# Patient Record
Sex: FEMALE | Race: WHITE | Employment: FULL TIME | URBAN - NONMETROPOLITAN AREA
[De-identification: names, ages, dates, MRNs, and addresses within clinical notes are randomized per-mention and may not be internally consistent; named-entity substitution may affect disease eponyms.]

---

## 2017-03-06 ENCOUNTER — TELEPHONE (OUTPATIENT)
Dept: OBGYN | Age: 37
End: 2017-03-06

## 2017-03-14 ENCOUNTER — OFFICE VISIT (OUTPATIENT)
Dept: OBGYN | Age: 37
End: 2017-03-14
Payer: COMMERCIAL

## 2017-03-14 VITALS
BODY MASS INDEX: 21.79 KG/M2 | TEMPERATURE: 99.6 F | HEART RATE: 69 BPM | SYSTOLIC BLOOD PRESSURE: 109 MMHG | WEIGHT: 135 LBS | DIASTOLIC BLOOD PRESSURE: 69 MMHG

## 2017-03-14 DIAGNOSIS — R30.0 BURNING WITH URINATION: ICD-10-CM

## 2017-03-14 DIAGNOSIS — N94.10 PAIN IN FEMALE GENITALIA ON INTERCOURSE: ICD-10-CM

## 2017-03-14 DIAGNOSIS — N39.0 FREQUENT UTI: Primary | ICD-10-CM

## 2017-03-14 DIAGNOSIS — N93.0 BLEEDING AFTER INTERCOURSE: ICD-10-CM

## 2017-03-14 DIAGNOSIS — R31.9 BLOOD IN URINE: ICD-10-CM

## 2017-03-14 LAB
BACTERIA URINE, POC: ABNORMAL
BILIRUBIN URINE: ABNORMAL MG/DL
BLOOD, URINE: POSITIVE
CASTS URINE, POC: ABNORMAL
CLARITY: ABNORMAL
COLOR: ABNORMAL
CRYSTALS URINE, POC: ABNORMAL
EPI CELLS URINE, POC: ABNORMAL
GLUCOSE URINE: ABNORMAL
KETONES, URINE: POSITIVE
LEUKOCYTE EST, POC: NEGATIVE
NITRITE, URINE: POSITIVE
PH UA: ABNORMAL (ref 4.5–8)
PROTEIN UA: NEGATIVE
RBC URINE, POC: ABNORMAL
SPECIFIC GRAVITY UA: ABNORMAL (ref 1–1.03)
UROBILINOGEN, URINE: NORMAL
WBC URINE, POC: ABNORMAL
YEAST URINE, POC: ABNORMAL

## 2017-03-14 PROCEDURE — 81000 URINALYSIS NONAUTO W/SCOPE: CPT | Performed by: NURSE PRACTITIONER

## 2017-03-14 PROCEDURE — 99213 OFFICE O/P EST LOW 20 MIN: CPT | Performed by: NURSE PRACTITIONER

## 2017-03-14 RX ORDER — BACLOFEN 10 MG/1
TABLET ORAL
COMMUNITY
Start: 2017-01-23 | End: 2017-10-26

## 2017-03-14 RX ORDER — NITROFURANTOIN 25; 75 MG/1; MG/1
100 CAPSULE ORAL 2 TIMES DAILY
Qty: 20 CAPSULE | Refills: 0 | Status: SHIPPED | OUTPATIENT
Start: 2017-03-14 | End: 2017-03-24

## 2017-03-14 RX ORDER — ZOLPIDEM TARTRATE 10 MG/1
TABLET ORAL
COMMUNITY
Start: 2017-02-16 | End: 2017-10-26

## 2017-03-14 ASSESSMENT — ENCOUNTER SYMPTOMS
TROUBLE SWALLOWING: 0
COUGH: 0
SHORTNESS OF BREATH: 0
CONSTIPATION: 0
DIARRHEA: 0
BACK PAIN: 0
BLOOD IN STOOL: 0
COLOR CHANGE: 0

## 2017-03-16 LAB — URINE CULTURE, ROUTINE: NORMAL

## 2017-03-21 DIAGNOSIS — N76.0 BV (BACTERIAL VAGINOSIS): Primary | ICD-10-CM

## 2017-03-21 DIAGNOSIS — B96.89 BV (BACTERIAL VAGINOSIS): Primary | ICD-10-CM

## 2017-03-21 RX ORDER — METRONIDAZOLE 500 MG/1
500 TABLET ORAL 2 TIMES DAILY WITH MEALS
Qty: 14 TABLET | Refills: 0 | Status: SHIPPED | OUTPATIENT
Start: 2017-03-21 | End: 2017-03-28

## 2017-10-26 ENCOUNTER — OFFICE VISIT (OUTPATIENT)
Dept: OBGYN | Age: 37
End: 2017-10-26
Payer: COMMERCIAL

## 2017-10-26 VITALS
BODY MASS INDEX: 24.43 KG/M2 | TEMPERATURE: 97.8 F | HEART RATE: 76 BPM | SYSTOLIC BLOOD PRESSURE: 119 MMHG | HEIGHT: 66 IN | WEIGHT: 152 LBS | DIASTOLIC BLOOD PRESSURE: 73 MMHG

## 2017-10-26 DIAGNOSIS — Z01.419 ENCOUNTER FOR GYNECOLOGICAL EXAMINATION WITHOUT ABNORMAL FINDING: Primary | ICD-10-CM

## 2017-10-26 DIAGNOSIS — Z12.4 SCREENING FOR CERVICAL CANCER: ICD-10-CM

## 2017-10-26 PROCEDURE — 99395 PREV VISIT EST AGE 18-39: CPT

## 2017-10-26 RX ORDER — ETODOLAC 400 MG/1
TABLET, FILM COATED ORAL
Refills: 3 | COMMUNITY
Start: 2017-10-09 | End: 2019-12-27 | Stop reason: ALTCHOICE

## 2017-10-26 RX ORDER — VILAZODONE HYDROCHLORIDE 40 MG/1
TABLET ORAL
Refills: 1 | COMMUNITY
Start: 2017-10-17 | End: 2019-12-27

## 2017-10-26 RX ORDER — NORGESTIMATE AND ETHINYL ESTRADIOL 0.25-0.035
KIT ORAL
Qty: 84 TABLET | Refills: 3 | Status: SHIPPED | OUTPATIENT
Start: 2017-10-26 | End: 2019-12-27

## 2017-10-26 ASSESSMENT — ENCOUNTER SYMPTOMS
SHORTNESS OF BREATH: 0
BLOOD IN STOOL: 0
COUGH: 0
TROUBLE SWALLOWING: 0
BACK PAIN: 0
DIARRHEA: 0
COLOR CHANGE: 0
CONSTIPATION: 0

## 2017-10-26 NOTE — PROGRESS NOTES
Subjective:      Patient ID: Marti Garcia is a 44 y.o. female. Patient presents today for a pap smear and breast exam.  HPI    Review of Systems   Constitutional: Negative for appetite change, fatigue, fever and unexpected weight change. HENT: Negative for hearing loss and trouble swallowing. Eyes: Negative for visual disturbance. Respiratory: Negative for cough and shortness of breath. Cardiovascular: Negative for chest pain and palpitations. Gastrointestinal: Negative for blood in stool, constipation and diarrhea. Genitourinary: Negative for dyspareunia, dysuria, frequency, menstrual problem, pelvic pain, urgency and vaginal discharge. Musculoskeletal: Negative for back pain, myalgias and neck pain. Skin: Negative for color change and rash. Neurological: Negative for dizziness, weakness and headaches. Hematological: Does not bruise/bleed easily. Psychiatric/Behavioral: Negative for agitation and sleep disturbance. The patient is not nervous/anxious. Marti Garcia is a 44 y.o. female with the following history as recorded in [facility]:  Patient Active Problem List    Diagnosis Date Noted    Screening for cervical cancer 10/26/2017     Current Outpatient Prescriptions   Medication Sig Dispense Refill    VILAZODONE HCL 40 MG Tablet TAKE 1 TABLET BY MOUTH EVERY DAY WITH FOOD  1    etodolac (LODINE) 400 MG tablet TAKE 1 TABLET BY MOUTH TWICE A DAY  3    norgestimate-ethinyl estradiol (SPRINTEC 28) 0.25-35 MG-MCG per tablet TAKE 1 TABLET BY MOUTH DAILY 84 tablet 3     No current facility-administered medications for this visit.       Allergies: Doxycycline; Erythromycin; and Sulfa antibiotics  Past Medical History:   Diagnosis Date    Allergic     Anemia     with pregnancy    ASCUS favor benign 10/2015    Depression     Recurrent miscarriages      Past Surgical History:   Procedure Laterality Date    DENTAL SURGERY      Tooth Implant    DILATION AND CURETTAGE OF UTERUS      ORDERS:  No orders of the defined types were placed in this encounter. The importance of self-breast examination was discussed. A screening mammogram is recommended at age 28 unless otherwise indicated. At age 36, annual mammogram screenings are recommended. Birth control options were discussed. Rx sent in with refills. A well balanced diet and exercise were encouraged, as well as the addition of multivitamin. Pt to RTO in 2 weeks to collect pap smear.

## 2017-10-26 NOTE — PATIENT INSTRUCTIONS
dentist one or two times a year for checkups and to have your teeth cleaned. · Wear a seat belt in the car. · Drink alcohol in moderation, if at all. That means no more than 2 drinks a day for men and 1 drink a day for women. Follow your doctor's advice about when to have certain tests. These tests can spot problems early. For everyone  · Cholesterol. Have the fat (cholesterol) in your blood tested after age 21. Your doctor will tell you how often to have this done based on your age, family history, or other things that can increase your risk for heart disease. · Blood pressure. Have your blood pressure checked during a routine doctor visit. Your doctor will tell you how often to check your blood pressure based on your age, your blood pressure results, and other factors. · Vision. Talk with your doctor about how often to have a glaucoma test.  · Diabetes. Ask your doctor whether you should have tests for diabetes. · Colon cancer. Have a test for colon cancer at age 48. You may have one of several tests. If you are younger than 48, you may need a test earlier if you have any risk factors. Risk factors include whether you already had a precancerous polyp removed from your colon or whether your parent, brother, sister, or child has had colon cancer. For women  · Breast exam and mammogram. Talk to your doctor about when you should have a clinical breast exam and a mammogram. Medical experts differ on whether and how often women under 50 should have these tests. Your doctor can help you decide what is right for you. · Pap test and pelvic exam. Begin Pap tests at age 24. A Pap test is the best way to find cervical cancer. The test often is part of a pelvic exam. Ask how often to have this test.  · Tests for sexually transmitted infections (STIs). Ask whether you should have tests for STIs. You may be at risk if you have sex with more than one person, especially if your partners do not wear condoms.   For men  · Tests for sexually transmitted infections (STIs). Ask whether you should have tests for STIs. You may be at risk if you have sex with more than one person, especially if you do not wear a condom. · Testicular cancer exam. Ask your doctor whether you should check your testicles regularly. · Prostate exam. Talk to your doctor about whether you should have a blood test (called a PSA test) for prostate cancer. Experts differ on whether and when men should have this test. Some experts suggest it if you are older than 39 and are -American or have a father or brother who got prostate cancer when he was younger than 72. When should you call for help? Watch closely for changes in your health, and be sure to contact your doctor if you have any problems or symptoms that concern you. Where can you learn more? Go to https://Twonqpevickieb.healthBig red truck driving school. org and sign in to your One-Song account. Enter P072 in the ADTELLIGENCE box to learn more about \"Well Visit, Ages 25 to 48: Care Instructions. \"     If you do not have an account, please click on the \"Sign Up Now\" link. Current as of: July 19, 2016  Content Version: 11.3  © 2998-0787 [employer], Incorporated. Care instructions adapted under license by [facility] ([facility]). If you have questions about a medical condition or this instruction, always ask your healthcare professional. [address] any warranty or liability for your use of this information.

## 2017-12-05 ENCOUNTER — OFFICE VISIT (OUTPATIENT)
Dept: OBGYN | Age: 37
End: 2017-12-05
Payer: COMMERCIAL

## 2017-12-05 VITALS
HEIGHT: 66 IN | DIASTOLIC BLOOD PRESSURE: 72 MMHG | BODY MASS INDEX: 25.23 KG/M2 | WEIGHT: 157 LBS | SYSTOLIC BLOOD PRESSURE: 115 MMHG

## 2017-12-05 DIAGNOSIS — Z12.4 SCREENING FOR CERVICAL CANCER: ICD-10-CM

## 2017-12-05 DIAGNOSIS — R10.31 RLQ ABDOMINAL PAIN: Primary | ICD-10-CM

## 2017-12-05 PROCEDURE — 99213 OFFICE O/P EST LOW 20 MIN: CPT | Performed by: NURSE PRACTITIONER

## 2017-12-05 ASSESSMENT — ENCOUNTER SYMPTOMS
RESPIRATORY NEGATIVE: 1
GASTROINTESTINAL NEGATIVE: 1
EYES NEGATIVE: 1

## 2017-12-05 NOTE — PROGRESS NOTES
uterus that opens into the vagina. The test can help your doctor find early changes in the cells that could lead to cancer. The sample of cells taken during your test has been sent to a lab so that an expert can look at the cells. It usually takes a week or two to get the results back. Follow-up care is a key part of your treatment and safety. Be sure to make and go to all appointments, and call your doctor if you are having problems. It's also a good idea to know your test results and keep a list of the medicines you take. What do the results mean? · A normal result means that the test did not find any abnormal cells in the sample. · An abnormal result can mean many things. Most of these are not cancer. The results of your test may be abnormal because:  ¨ You have an infection of the vagina or cervix, such as a yeast infection. ¨ You have an IUD (intrauterine device for birth control). ¨ You have low estrogen levels after menopause that are causing the cells to change. ¨ You have cell changes that may be a sign of precancer or cancer. The results are ranked based on how serious the changes might be. There are many other reasons why you might not get a normal result. If the results were abnormal, you may need to get another test within a few weeks or months. If the results show changes that could be a sign of cancer, you may need a test called a colposcopy, which provides a more complete view of the cervix. Sometimes the lab cannot use the sample because it does not contain enough cells or was not preserved well. If so, you may need to have the test again. This is not common, but it does happen from time to time. When should you call for help?   Watch closely for changes in your health, and be sure to contact your doctor if:  · You have vaginal bleeding or pain for more than 2 days after the test. It is normal to have a small amount of bleeding for a day or two after the test.  Where can you learn more?  Go to https://chpepiceweb.[email or web address] and sign in to your Protenust account. Enter M152 in the [facility] box to learn more about \"Pap Test: Care Instructions. \"     If you do not have an account, please click on the \"Sign Up Now\" link. Current as of: July 26, 2016  Content Version: 11.3  © 3664-9927 Eig[employer] Branded Reality. Care instructions adapted under license by [facility] ([facility]). If you have questions about a medical condition or this instruction, always ask your healthcare professional. [place] [address] any warranty or liability for your use of this information.

## 2017-12-05 NOTE — PATIENT INSTRUCTIONS
doctor if:  · You have vaginal bleeding or pain for more than 2 days after the test. It is normal to have a small amount of bleeding for a day or two after the test.  Where can you learn more? Go to https://[email or web address].[email or web address] org and sign in to your Kira Talent account. Enter B079 in the [employer] box to learn more about \"Pap Test: Care Instructions. \"     If you do not have an account, please click on the \"Sign Up Now\" link. Current as of: July 26, 2016  Content Version: 11.3  © 8092-2384 [employer]12 Technologies, BackTrack. Care instructions adapted under license by [facility] ([facility]). If you have questions about a medical condition or this instruction, always ask your healthcare professional. [address] any warranty or liability for your use of this information.

## 2017-12-06 ENCOUNTER — TRANSCRIBE ORDERS (OUTPATIENT)
Dept: ADMINISTRATIVE | Facility: HOSPITAL | Age: 37
End: 2017-12-06

## 2017-12-06 ENCOUNTER — TELEPHONE (OUTPATIENT)
Dept: OBGYN | Age: 37
End: 2017-12-06

## 2017-12-06 DIAGNOSIS — R10.31 ABDOMINAL PAIN, RIGHT LOWER QUADRANT: Primary | ICD-10-CM

## 2017-12-08 ENCOUNTER — HOSPITAL ENCOUNTER (OUTPATIENT)
Dept: ULTRASOUND IMAGING | Facility: HOSPITAL | Age: 37
Discharge: HOME | End: 2017-12-08
Admitting: NURSE PRACTITIONER

## 2017-12-08 DIAGNOSIS — R10.31 ABDOMINAL PAIN, RIGHT LOWER QUADRANT: ICD-10-CM

## 2017-12-08 PROCEDURE — 76830 TRANSVAGINAL US NON-OB: CPT

## 2018-03-24 ENCOUNTER — TRANSCRIBE ORDERS (OUTPATIENT)
Dept: ADMINISTRATIVE | Facility: HOSPITAL | Age: 38
End: 2018-03-24

## 2018-03-24 ENCOUNTER — HOSPITAL ENCOUNTER (OUTPATIENT)
Dept: GENERAL RADIOLOGY | Facility: HOSPITAL | Age: 38
Discharge: HOME | End: 2018-03-24
Admitting: NURSE PRACTITIONER

## 2018-03-24 ENCOUNTER — TRANSCRIBE ORDERS (OUTPATIENT)
Dept: LAB | Facility: HOSPITAL | Age: 38
End: 2018-03-24

## 2018-03-24 ENCOUNTER — APPOINTMENT (OUTPATIENT)
Dept: LAB | Facility: HOSPITAL | Age: 38
End: 2018-03-24

## 2018-03-24 DIAGNOSIS — R10.30 LOWER ABDOMINAL PAIN: Primary | ICD-10-CM

## 2018-03-24 LAB
ALBUMIN SERPL-MCNC: 4.3 G/DL (ref 3.5–5)
ALBUMIN/GLOB SERPL: 1.2 G/DL (ref 1.1–2.5)
ALP SERPL-CCNC: 50 U/L (ref 24–120)
ALT SERPL W P-5'-P-CCNC: 41 U/L (ref 0–54)
AMYLASE SERPL-CCNC: 55 U/L (ref 30–110)
ANION GAP SERPL CALCULATED.3IONS-SCNC: 11 MMOL/L (ref 4–13)
AST SERPL-CCNC: 22 U/L (ref 7–45)
BILIRUB SERPL-MCNC: 0.8 MG/DL (ref 0.1–1)
BUN BLD-MCNC: 15 MG/DL (ref 5–21)
BUN/CREAT SERPL: 19.2 (ref 7–25)
CALCIUM SPEC-SCNC: 10 MG/DL (ref 8.4–10.4)
CHLORIDE SERPL-SCNC: 97 MMOL/L (ref 98–110)
CO2 SERPL-SCNC: 30 MMOL/L (ref 24–31)
CREAT BLD-MCNC: 0.78 MG/DL (ref 0.5–1.4)
DEPRECATED RDW RBC AUTO: 45 FL (ref 40–54)
ERYTHROCYTE [DISTWIDTH] IN BLOOD BY AUTOMATED COUNT: 13.2 % (ref 12–15)
GFR SERPL CREATININE-BSD FRML MDRD: 82 ML/MIN/1.73
GLOBULIN UR ELPH-MCNC: 3.6 GM/DL
GLUCOSE BLD-MCNC: 97 MG/DL (ref 70–100)
HBA1C MFR BLD: 5.4 %
HCT VFR BLD AUTO: 41.1 % (ref 37–47)
HGB BLD-MCNC: 13.4 G/DL (ref 12–16)
LIPASE SERPL-CCNC: 39 U/L (ref 23–203)
LYMPHOCYTES # BLD MANUAL: 4.34 10*3/MM3 (ref 0.72–4.86)
LYMPHOCYTES NFR BLD MANUAL: 2 % (ref 4–12)
LYMPHOCYTES NFR BLD MANUAL: 22 % (ref 15–45)
MCH RBC QN AUTO: 29.9 PG (ref 28–32)
MCHC RBC AUTO-ENTMCNC: 32.6 G/DL (ref 33–36)
MCV RBC AUTO: 91.7 FL (ref 82–98)
MONOCYTES # BLD AUTO: 0.39 10*3/MM3 (ref 0.19–1.3)
NEUTROPHILS # BLD AUTO: 14.8 10*3/MM3 (ref 1.87–8.4)
NEUTROPHILS NFR BLD MANUAL: 72 % (ref 39–78)
NEUTS BAND NFR BLD MANUAL: 3 % (ref 0–10)
PLATELET # BLD AUTO: 404 10*3/MM3 (ref 130–400)
PMV BLD AUTO: 10.1 FL (ref 6–12)
POTASSIUM BLD-SCNC: 4 MMOL/L (ref 3.5–5.3)
PROT SERPL-MCNC: 7.9 G/DL (ref 6.3–8.7)
RBC # BLD AUTO: 4.48 10*6/MM3 (ref 4.2–5.4)
RBC MORPH BLD: NORMAL
SMALL PLATELETS BLD QL SMEAR: ADEQUATE
SODIUM BLD-SCNC: 138 MMOL/L (ref 135–145)
VARIANT LYMPHS NFR BLD MANUAL: 1 % (ref 0–5)
WBC MORPH BLD: NORMAL
WBC NRBC COR # BLD: 19.73 10*3/MM3 (ref 4.8–10.8)

## 2018-03-24 PROCEDURE — 82150 ASSAY OF AMYLASE: CPT | Performed by: NURSE PRACTITIONER

## 2018-03-24 PROCEDURE — 36415 COLL VENOUS BLD VENIPUNCTURE: CPT

## 2018-03-24 PROCEDURE — 74018 RADEX ABDOMEN 1 VIEW: CPT

## 2018-03-24 PROCEDURE — 83036 HEMOGLOBIN GLYCOSYLATED A1C: CPT | Performed by: NURSE PRACTITIONER

## 2018-03-24 PROCEDURE — 87086 URINE CULTURE/COLONY COUNT: CPT | Performed by: NURSE PRACTITIONER

## 2018-03-24 PROCEDURE — 80053 COMPREHEN METABOLIC PANEL: CPT | Performed by: NURSE PRACTITIONER

## 2018-03-24 PROCEDURE — 85027 COMPLETE CBC AUTOMATED: CPT | Performed by: NURSE PRACTITIONER

## 2018-03-24 PROCEDURE — 85007 BL SMEAR W/DIFF WBC COUNT: CPT | Performed by: NURSE PRACTITIONER

## 2018-03-24 PROCEDURE — 83690 ASSAY OF LIPASE: CPT | Performed by: NURSE PRACTITIONER

## 2018-03-26 LAB
BACTERIA SPEC AEROBE CULT: ABNORMAL
BACTERIA SPEC AEROBE CULT: ABNORMAL

## 2018-04-12 PROBLEM — Z12.4 SCREENING FOR CERVICAL CANCER: Status: RESOLVED | Noted: 2017-10-26 | Resolved: 2018-04-12

## 2018-04-17 NOTE — PROGRESS NOTES
Ms. Zheng is 38 y.o. female    CHIEF COMPLAINT: I am here for recurring UTI.    HPI  Recurrent UTI  Patient is here for recurrent UTI.  This is a(an) intial visit. Possible coexisting conditions or situations that may predispose the patient to urinary tract include no obvious predisposition. The infections have been occurring for 5month(s).  There has been 4 infections in the last 5 month(s).  Organ involvement suggested by referring physician or patient is bladder. is unaware of their culture history.  Other evaluation includes urinalysis, physical exam and history.  Symptoms, when present, include urgency and frequency and back pain. The patient has been treated with  antibiotics  somewhat effective.               The following portions of the patient's history were reviewed and updated as appropriate: allergies, current medications, past family history, past medical history, past social history, past surgical history and problem list.      Review of Systems   Constitutional: Negative for appetite change, diaphoresis and fever.   HENT: Negative for facial swelling and sore throat.    Eyes: Negative for discharge and visual disturbance.   Respiratory: Negative for cough and shortness of breath.    Cardiovascular: Negative for chest pain and leg swelling.   Gastrointestinal: Negative for anal bleeding and vomiting.   Endocrine: Negative for cold intolerance and heat intolerance.   Genitourinary: Positive for frequency, pelvic pain (sharp pains) and urgency. Negative for flank pain and hematuria.   Musculoskeletal: Negative for back pain and gait problem.   Skin: Negative for pallor and rash.   Allergic/Immunologic: Negative for food allergies and immunocompromised state.   Neurological: Negative for seizures and headaches.   Hematological: Negative for adenopathy. Does not bruise/bleed easily.   Psychiatric/Behavioral: Negative for dysphoric mood, self-injury and suicidal ideas.           Current Outpatient  "Prescriptions:   •  citalopram (CeleXA) 20 MG tablet, Take 20 mg by mouth Daily., Disp: , Rfl:   •  Norgestimate-Eth Estradiol (SPRINTEC 28 PO), Take  by mouth., Disp: , Rfl:   •  cefuroxime (CEFTIN) 250 MG tablet, Take 1 tablet by mouth 2 (Two) Times a Day., Disp: 20 tablet, Rfl: 0  •  cetirizine (zyrTEC) 10 MG tablet, Take 1 tablet by mouth Daily., Disp: 30 tablet, Rfl: 0  •  nitrofurantoin (MACRODANTIN) 50 MG capsule, Take 1 capsule by mouth Every Night for 30 days., Disp: 30 capsule, Rfl: 2  •  oseltamivir (TAMIFLU) 75 MG capsule, Take 1 capsule by mouth Daily., Disp: 10 capsule, Rfl: 0    Past Medical History:   Diagnosis Date   • BV (bacterial vaginosis)        Past Surgical History:   Procedure Laterality Date   • TUBAL ABDOMINAL LIGATION         Social History     Social History   • Marital status:      Social History Main Topics   • Smoking status: Never Smoker   • Smokeless tobacco: Never Used   • Alcohol use No   • Drug use: Unknown     Other Topics Concern   • Not on file       No family history on file.      /68   Temp 97.6 °F (36.4 °C)   Ht 167.6 cm (66\")   Wt 71.2 kg (157 lb)   BMI 25.34 kg/m²       Physical Exam  Constitutional: Well nourished, Well developed; No apparent distress  Psychiatric: Appropriate affect; Alert and oriented  Eyes: Unremarkable  Musculoskeletal: Normal gait and station  GI: Abdomen is soft, non-tender  Respiratory: No distress; Unlabored movement; No accessory musculature needed with symmetric movements  Skin: No pallor or diaphoresis  : Labia normal; Urethral meatus normal position, not stenotic; No significant bladder prolapse.         Data  Results for orders placed or performed in visit on 04/23/18   POC Urinalysis Dipstick, Automated   Result Value Ref Range    Color Yellow Yellow, Straw, Dark Yellow, Tarsha    Clarity, UA Clear Clear    Glucose, UA Negative Negative, 1000 mg/dL (3+) mg/dL    Bilirubin Negative Negative    Ketones, UA Negative Negative "    Specific Gravity  1.145 (A) 1.005 - 1.030    Blood, UA Trace (A) Negative    pH, Urine 5.5 5.0 - 8.0    Protein, POC Negative Negative mg/dL    Urobilinogen, UA Normal Normal    Leukocytes Negative Negative    Nitrite, UA Negative Negative           Assessment and Plan  Diagnoses and all orders for this visit:    Recurrent cystitis  -     POC Urinalysis Dipstick, Automated  -     nitrofurantoin (MACRODANTIN) 50 MG capsule; Take 1 capsule by mouth Every Night for 30 days.  -     US Renal Bilateral; Future      The patient understands the recurrent urinary tract infections are often multifactorial in origin.  Discussion of optimum management in setting of no anatomic abnormalities for recurrent UTIs is completed.  Antimicrobial therapies including the risks, convenience, and rationale were explained including postcoital prophylaxis, self start therapy and daily prophylaxis are all explained.  Based upon this discussion we have elected to start daily nitrofurantoin.  I did explain that some bacteria or naturally resistant to this antibiotic but it does kill most uropathogens including Escherichia coli typically.  I explained that the attractive thickness of this antibiotic in this setting is that the resistance pattern has not changed over decades unlike some other antibiotics such as ciprofloxacin or levofloxacin.  I did explain that if she develops symptoms that are consistent with UTI while taking this antibiotic, it is possible that she will need a different antibiotic and should contact our office to arrange to be seen and have a urine culture done.  This will need to be done by an in and out cath I explained that if we were unavailable at in this setting she should see either her primary care physician or visit the [facility] ([facility].  The risk of pulmonary fibrosis is explained.  Chronic cough, dyspnea, hemoptysis, or other new pulmonary symptoms should be reported right away.  The patient expresses  an understanding.  Will do this for 2  months.        Oh Lopez MD  04/23/18  10:17 AM      Cc: Andrade Last MD

## 2018-04-23 ENCOUNTER — OFFICE VISIT (OUTPATIENT)
Dept: UROLOGY | Facility: CLINIC | Age: 38
End: 2018-04-23

## 2018-04-23 VITALS
HEIGHT: 66 IN | SYSTOLIC BLOOD PRESSURE: 122 MMHG | TEMPERATURE: 97.6 F | WEIGHT: 157 LBS | BODY MASS INDEX: 25.23 KG/M2 | DIASTOLIC BLOOD PRESSURE: 68 MMHG

## 2018-04-23 DIAGNOSIS — N30.90 RECURRENT CYSTITIS: Primary | ICD-10-CM

## 2018-04-23 LAB
BILIRUB BLD-MCNC: NEGATIVE MG/DL
CLARITY, POC: CLEAR
COLOR UR: YELLOW
GLUCOSE UR STRIP-MCNC: NEGATIVE MG/DL
KETONES UR QL: NEGATIVE
LEUKOCYTE EST, POC: NEGATIVE
NITRITE UR-MCNC: NEGATIVE MG/ML
PH UR: 5.5 [PH] (ref 5–8)
PROT UR STRIP-MCNC: NEGATIVE MG/DL
RBC # UR STRIP: ABNORMAL /UL
SP GR UR: 1.15 (ref 1–1.03)
UROBILINOGEN UR QL: NORMAL

## 2018-04-23 PROCEDURE — 99204 OFFICE O/P NEW MOD 45 MIN: CPT | Performed by: UROLOGY

## 2018-04-23 PROCEDURE — 81003 URINALYSIS AUTO W/O SCOPE: CPT | Performed by: UROLOGY

## 2018-04-23 RX ORDER — NITROFURANTOIN MACROCRYSTALS 50 MG/1
50 CAPSULE ORAL NIGHTLY
Qty: 30 CAPSULE | Refills: 2 | Status: SHIPPED | OUTPATIENT
Start: 2018-04-23 | End: 2018-05-23

## 2018-04-23 RX ORDER — CITALOPRAM 20 MG/1
20 TABLET ORAL DAILY
COMMUNITY
End: 2021-01-01

## 2018-04-23 NOTE — PATIENT INSTRUCTIONS

## 2018-04-30 ENCOUNTER — PROCEDURE VISIT (OUTPATIENT)
Dept: UROLOGY | Facility: CLINIC | Age: 38
End: 2018-04-30

## 2018-04-30 ENCOUNTER — HOSPITAL ENCOUNTER (OUTPATIENT)
Dept: ULTRASOUND IMAGING | Facility: HOSPITAL | Age: 38
Discharge: HOME | End: 2018-04-30
Attending: UROLOGY | Admitting: UROLOGY

## 2018-04-30 DIAGNOSIS — N30.90 RECURRENT CYSTITIS: ICD-10-CM

## 2018-04-30 DIAGNOSIS — N30.90 RECURRENT CYSTITIS: Primary | ICD-10-CM

## 2018-04-30 LAB
BILIRUB BLD-MCNC: NEGATIVE MG/DL
CLARITY, POC: CLEAR
COLOR UR: YELLOW
GLUCOSE UR STRIP-MCNC: NEGATIVE MG/DL
KETONES UR QL: NEGATIVE
LEUKOCYTE EST, POC: ABNORMAL
NITRITE UR-MCNC: NEGATIVE MG/ML
PH UR: 6.5 [PH] (ref 5–8)
PROT UR STRIP-MCNC: NEGATIVE MG/DL
RBC # UR STRIP: NEGATIVE /UL
SP GR UR: 1.01 (ref 1–1.03)
UROBILINOGEN UR QL: NORMAL

## 2018-04-30 PROCEDURE — 76775 US EXAM ABDO BACK WALL LIM: CPT

## 2018-04-30 PROCEDURE — 81003 URINALYSIS AUTO W/O SCOPE: CPT | Performed by: UROLOGY

## 2018-04-30 PROCEDURE — 52000 CYSTOURETHROSCOPY: CPT | Performed by: UROLOGY

## 2018-04-30 NOTE — PROGRESS NOTES
CC: I am here for the doctor to look at my bladder    Cystoscopy procedure note  Pre- operative diagnosis:  Recurrent UTI    Post operative diagnosis:  Same    Procedure:  The patient was prepped and draped in a normal sterile fashion.  The urethra was anesthetized with 2% lidocaine jelly.  A rigid cystoscope was introduced per urethra. The 30% and 70% lenses are used.  The urethra is normal in appearance without obstruction or mass.  The bladder is without evidence of mucosal lesion.   There is no abnormality of the urothelium.  There is minimal trabeculation of the detrusor muscle.  The ureteral orifices are orthotopic in position and they efflux clear urine.    Patient tolerated the procedure well    Complications: none    Blood loss: minimal    EXAMINATION: US RENAL BILATERAL- 4/30/2018 10:22 AM CDT     HISTORY: recurrent uti; N30.90-Cystitis, unspecified without hematuria.  REPORT: Sonographic images of the kidneys were obtained. There are no  comparison studies.     The right kidney measures 10.5 x 4.4 x 5.6 cm and has normal morphology  and echogenicity. There is no mass or hydronephrosis.     Left kidney measures 10.0 x 5.2 x 5.7 cm and appears unremarkable. Color  Doppler images demonstrate vascular flow within both kidneys.     The bladder is well distended but appears within normal limits.     IMPRESSION:  Normal ultrasound of the kidneys.  This report was finalized on 04/30/2018 10:23 by Dr. Avery Christie MD.    Diagnoses and all orders for this visit:    Recurrent cystitis  -     POC Urinalysis Dipstick, Automated        Follow up:    Routine follow up

## 2018-07-31 ENCOUNTER — TELEPHONE (OUTPATIENT)
Dept: UROLOGY | Facility: CLINIC | Age: 38
End: 2018-07-31

## 2018-10-02 ENCOUNTER — TRANSCRIBE ORDERS (OUTPATIENT)
Dept: ADMINISTRATIVE | Facility: HOSPITAL | Age: 38
End: 2018-10-02

## 2018-10-02 DIAGNOSIS — N63.20 LEFT BREAST LUMP: Primary | ICD-10-CM

## 2018-10-03 ENCOUNTER — TRANSCRIBE ORDERS (OUTPATIENT)
Dept: ADMINISTRATIVE | Facility: HOSPITAL | Age: 38
End: 2018-10-03

## 2018-10-03 ENCOUNTER — HOSPITAL ENCOUNTER (OUTPATIENT)
Dept: ULTRASOUND IMAGING | Facility: HOSPITAL | Age: 38
Discharge: HOME | End: 2018-10-03
Admitting: NURSE PRACTITIONER

## 2018-10-03 ENCOUNTER — HOSPITAL ENCOUNTER (OUTPATIENT)
Dept: MAMMOGRAPHY | Facility: HOSPITAL | Age: 38
Discharge: HOME | End: 2018-10-03

## 2018-10-03 DIAGNOSIS — N63.20 LEFT BREAST LUMP: Primary | ICD-10-CM

## 2018-10-03 DIAGNOSIS — N63.20 LEFT BREAST LUMP: ICD-10-CM

## 2018-10-03 PROCEDURE — 77066 DX MAMMO INCL CAD BI: CPT

## 2018-10-03 PROCEDURE — 76642 ULTRASOUND BREAST LIMITED: CPT

## 2018-10-03 PROCEDURE — G0279 TOMOSYNTHESIS, MAMMO: HCPCS

## 2018-12-18 ENCOUNTER — OFFICE VISIT (OUTPATIENT)
Dept: URGENT CARE | Age: 38
End: 2018-12-18
Payer: COMMERCIAL

## 2018-12-18 VITALS
HEART RATE: 65 BPM | RESPIRATION RATE: 18 BRPM | TEMPERATURE: 97.9 F | OXYGEN SATURATION: 98 % | SYSTOLIC BLOOD PRESSURE: 118 MMHG | WEIGHT: 164 LBS | DIASTOLIC BLOOD PRESSURE: 68 MMHG | BODY MASS INDEX: 26.47 KG/M2

## 2018-12-18 DIAGNOSIS — S61.216A LACERATION OF RIGHT LITTLE FINGER WITHOUT FOREIGN BODY WITHOUT DAMAGE TO NAIL, INITIAL ENCOUNTER: Primary | ICD-10-CM

## 2018-12-18 PROCEDURE — 90471 IMMUNIZATION ADMIN: CPT | Performed by: SPECIALIST

## 2018-12-18 PROCEDURE — 99213 OFFICE O/P EST LOW 20 MIN: CPT | Performed by: SPECIALIST

## 2018-12-18 PROCEDURE — 90715 TDAP VACCINE 7 YRS/> IM: CPT | Performed by: SPECIALIST

## 2018-12-18 RX ORDER — BUSPIRONE HYDROCHLORIDE 7.5 MG/1
TABLET ORAL
Refills: 3 | COMMUNITY
Start: 2018-11-11 | End: 2019-12-27 | Stop reason: ALTCHOICE

## 2018-12-18 RX ORDER — CITALOPRAM 20 MG/1
20 TABLET ORAL
COMMUNITY
End: 2019-12-27

## 2018-12-18 NOTE — PATIENT INSTRUCTIONS
Patient Education        Cuts on the Hand Closed With Stitches: Care Instructions  Your Care Instructions    A cut on your hand can be on your fingers, your thumb, or the front or back of your hand. Sometimes a cut can injure the tendons, blood vessels, or nerves of your hand. The doctor used stitches to close the cut. Using stitches also helps the cut heal and reduces scarring. The doctor may have given you a splint to help prevent you from moving your hand, fingers, or thumb. If the cut went deep and through the skin, the doctor put in two layers of stitches. The deeper layer brings the deep part of the cut together. These stitches will dissolve and don't need to be removed. The stitches in the upper layer are the ones you see on the cut. You will probably have a bandage. You will need to have the stitches removed, usually in 7 to 14 days. The doctor may suggest that you see a hand specialist if the cut is very deep or if you have trouble moving your fingers or have less feeling in your hand. The doctor has checked you carefully, but problems can develop later. If you notice any problems or new symptoms, get medical treatment right away. Follow-up care is a key part of your treatment and safety. Be sure to make and go to all appointments, and call your doctor if you are having problems. It's also a good idea to know your test results and keep a list of the medicines you take. How can you care for yourself at home? · Keep the cut dry for the first 24 to 48 hours. After this, you can shower if your doctor okays it. Pat the cut dry. · Don't soak the cut, such as in a bathtub. Your doctor will tell you when it's safe to get the cut wet. · If your doctor told you how to care for your cut, follow your doctor's instructions. If you did not get instructions, follow this general advice:  ? After the first 24 to 48 hours, wash around the cut with clean water 2 times a day.  Don't use hydrogen peroxide or alcohol,

## 2018-12-18 NOTE — PROGRESS NOTES
[facility]  [address] [place] [address] Marcello Dimas [address]  Dept: [phone]  Loc: [phone]    Dot Car is a 43 y.o. female who presents today for her medical conditions/complaintsas noted below. Dot Car is c/o of Work Related Injury (works at [employer] a/ happened this morning/ cleaning the tomato slicer and cut her pinky on her right hand)        HPI:     Hand Injury    The incident occurred 1 to 3 hours ago. The incident occurred at work. Injury mechanism: cleaning off tomato slicer. The pain is present in the right hand. The quality of the pain is described as aching. The pain does not radiate. The pain is mild. The pain has been constant since the incident. Pertinent negatives include no numbness or tingling. The symptoms are aggravated by palpation. She has tried elevation for the symptoms. The treatment provided mild relief.        Past Medical History:   Diagnosis Date    Allergic     Anemia     with pregnancy    ASCUS favor benign 10/2015    Depression     Recurrent miscarriages      Past Surgical History:   Procedure Laterality Date    DENTAL SURGERY      Tooth Implant    DILATION AND CURETTAGE OF UTERUS      SALPINGECTOMY Bilateral     Dr. Albert Mathur       Family History   Problem Relation Age of Onset    Diabetes Paternal Grandmother     Stroke Father        Social History   Substance Use Topics    Smoking status: Never Smoker    Smokeless tobacco: Never Used    Alcohol use No      Current Outpatient Prescriptions   Medication Sig Dispense Refill    citalopram (CELEXA) 20 MG tablet Take 20 mg by mouth      busPIRone (BUSPAR) 7.5 MG tablet TAKE 1 TABLET BY MOUTH TWICE A DAY  3    VILAZODONE HCL 40 MG Tablet TAKE 1 TABLET BY MOUTH EVERY DAY WITH FOOD  1    etodolac (LODINE) 400 MG tablet TAKE 1 TABLET BY MOUTH TWICE A DAY  3    norgestimate-ethinyl estradiol (SPRINTEC 28) 0.25-35 MG-MCG per tablet TAKE 1 TABLET BY around the cut.  ? Red streaks leading from the cut.  ? Pus draining from the cut.  ? A fever.    Watch closely for changes in your health, and be sure to contact your doctor if:    · You do not get better as expected. Where can you learn more? Go to [email or web address] and sign in to your Kiwupt account. Enter T250 in the [facility] box to learn more about \"Cuts on the Hand Closed With Stitches: Care Instructions. \"     If you do not have an account, please click on the \"Sign Up Now\" link. Current as of: November 20, 2017  Content Version: 11.8  © 0850-0821 [employer]onte [employer]. Care instructions adapted under license by [facility] ([facility]). If you have questions about a medical condition or this instruction, always ask your healthcare professional. [address] any warranty or liability for your use of this information. 1.  Keep wound elevated to allow for decrease in swelling  2. Apply ice for 20 minutes every 2 hours. Please be diligent in doing this the first 24 hours to help with decrease in swelling. 3.  If you see any redness, streaks, swelling, drainage or begin to run a fever please return to urgent care or go to nearest emergency room.   4.  NO work today, may return tomorrow        Electronically signed by HERNAN Blackwell NP on 12/18/2018 at 11:30 AM

## 2018-12-26 ENCOUNTER — OFFICE VISIT (OUTPATIENT)
Dept: URGENT CARE | Age: 38
End: 2018-12-26
Payer: COMMERCIAL

## 2018-12-26 VITALS
BODY MASS INDEX: 26.46 KG/M2 | HEIGHT: 67 IN | WEIGHT: 168.6 LBS | OXYGEN SATURATION: 98 % | HEART RATE: 74 BPM | SYSTOLIC BLOOD PRESSURE: 111 MMHG | RESPIRATION RATE: 20 BRPM | TEMPERATURE: 98.1 F | DIASTOLIC BLOOD PRESSURE: 69 MMHG

## 2018-12-26 DIAGNOSIS — S61.216A LACERATION OF RIGHT LITTLE FINGER WITHOUT FOREIGN BODY WITHOUT DAMAGE TO NAIL, INITIAL ENCOUNTER: Primary | ICD-10-CM

## 2018-12-26 PROCEDURE — 99213 OFFICE O/P EST LOW 20 MIN: CPT | Performed by: NURSE PRACTITIONER

## 2018-12-26 ASSESSMENT — ENCOUNTER SYMPTOMS
COUGH: 0
EYE REDNESS: 0
EYES NEGATIVE: 1
SHORTNESS OF BREATH: 0
RESPIRATORY NEGATIVE: 1
WHEEZING: 0
EYE ITCHING: 0
GASTROINTESTINAL NEGATIVE: 1

## 2018-12-27 NOTE — PROGRESS NOTES
[address] [facility] E CARE  [facility] [address]  Dept: [phone]  Loc: [phone]    Angelo Parekh is a 43 y.o. female who presents today for her medical conditions/complaintsas noted below. Angelo Parekh is c/o of Suture / Staple Removal (removed from right hand, pinky finger)        HPI:     HPI   Pt presents for suture removal right hand pinky finger. States stitches placed 8 days ago. Denies fever, drainage, redness, pain, streaks from finger. Denies any other symptoms. Past Medical History:   Diagnosis Date    Allergic     Anemia     with pregnancy    ASCUS favor benign 10/2015    Depression     Recurrent miscarriages       Past Surgical History:   Procedure Laterality Date    DENTAL SURGERY      Tooth Implant    DILATION AND CURETTAGE OF UTERUS      SALPINGECTOMY Bilateral     Dr. Jarrett Kerns       Family History   Problem Relation Age of Onset    Diabetes Paternal Grandmother     Stroke Father        Social History   Substance Use Topics    Smoking status: Never Smoker    Smokeless tobacco: Never Used    Alcohol use Yes      Comment: socially      Current Outpatient Prescriptions   Medication Sig Dispense Refill    citalopram (CELEXA) 20 MG tablet Take 20 mg by mouth      busPIRone (BUSPAR) 7.5 MG tablet TAKE 1 TABLET BY MOUTH TWICE A DAY  3    VILAZODONE HCL 40 MG Tablet TAKE 1 TABLET BY MOUTH EVERY DAY WITH FOOD  1    etodolac (LODINE) 400 MG tablet TAKE 1 TABLET BY MOUTH TWICE A DAY  3    norgestimate-ethinyl estradiol (SPRINTEC 28) 0.25-35 MG-MCG per tablet TAKE 1 TABLET BY MOUTH DAILY 84 tablet 3     No current facility-administered medications for this visit.       Allergies   Allergen Reactions    Doxycycline Hives and Itching    Erythromycin Hives and Itching    Sulfa Antibiotics Hives and Itching       Health Maintenance   Topic Date Due    Flu vaccine (1) 09/01/2018    Cervical cancer screen

## 2019-03-17 ENCOUNTER — HOSPITAL ENCOUNTER (EMERGENCY)
Facility: HOSPITAL | Age: 39
Discharge: HOME | End: 2019-03-17
Admitting: EMERGENCY MEDICINE

## 2019-03-17 VITALS
OXYGEN SATURATION: 98 % | BODY MASS INDEX: 24.36 KG/M2 | WEIGHT: 155.2 LBS | TEMPERATURE: 98 F | HEART RATE: 69 BPM | SYSTOLIC BLOOD PRESSURE: 112 MMHG | HEIGHT: 67 IN | RESPIRATION RATE: 16 BRPM | DIASTOLIC BLOOD PRESSURE: 72 MMHG

## 2019-03-17 DIAGNOSIS — N39.0 URINARY TRACT INFECTION WITH HEMATURIA, SITE UNSPECIFIED: Primary | ICD-10-CM

## 2019-03-17 DIAGNOSIS — N92.1 MENORRHAGIA WITH IRREGULAR CYCLE: ICD-10-CM

## 2019-03-17 DIAGNOSIS — R31.9 URINARY TRACT INFECTION WITH HEMATURIA, SITE UNSPECIFIED: Primary | ICD-10-CM

## 2019-03-17 LAB
ALBUMIN SERPL-MCNC: 4.2 G/DL (ref 3.5–5)
ALBUMIN/GLOB SERPL: 1.4 G/DL (ref 1.1–2.5)
ALP SERPL-CCNC: 47 U/L (ref 24–120)
ALT SERPL W P-5'-P-CCNC: <15 U/L (ref 0–54)
ANION GAP SERPL CALCULATED.3IONS-SCNC: 11 MMOL/L (ref 4–13)
AST SERPL-CCNC: 26 U/L (ref 7–45)
B-HCG UR QL: NEGATIVE
BACTERIA UR QL AUTO: ABNORMAL /HPF
BASOPHILS # BLD AUTO: 0.04 10*3/MM3 (ref 0–0.2)
BASOPHILS NFR BLD AUTO: 0.6 % (ref 0–2)
BILIRUB SERPL-MCNC: 0.9 MG/DL (ref 0.1–1)
BILIRUB UR QL STRIP: ABNORMAL
BUN BLD-MCNC: 10 MG/DL (ref 5–21)
BUN/CREAT SERPL: 14.3 (ref 7–25)
CALCIUM SPEC-SCNC: 9.9 MG/DL (ref 8.4–10.4)
CHLORIDE SERPL-SCNC: 101 MMOL/L (ref 98–110)
CLARITY UR: ABNORMAL
CO2 SERPL-SCNC: 27 MMOL/L (ref 24–31)
COLOR UR: ABNORMAL
CREAT BLD-MCNC: 0.7 MG/DL (ref 0.5–1.4)
DEPRECATED RDW RBC AUTO: 41.6 FL (ref 40–54)
EOSINOPHIL # BLD AUTO: 0.03 10*3/MM3 (ref 0–0.7)
EOSINOPHIL NFR BLD AUTO: 0.4 % (ref 0–4)
ERYTHROCYTE [DISTWIDTH] IN BLOOD BY AUTOMATED COUNT: 12.4 % (ref 12–15)
GFR SERPL CREATININE-BSD FRML MDRD: 93 ML/MIN/1.73
GLOBULIN UR ELPH-MCNC: 3.1 GM/DL
GLUCOSE BLD-MCNC: 97 MG/DL (ref 70–100)
GLUCOSE UR STRIP-MCNC: NEGATIVE MG/DL
HCT VFR BLD AUTO: 39 % (ref 37–47)
HGB BLD-MCNC: 13 G/DL (ref 12–16)
HGB UR QL STRIP.AUTO: ABNORMAL
HYALINE CASTS UR QL AUTO: ABNORMAL /LPF
IMM GRANULOCYTES # BLD AUTO: 0.01 10*3/MM3 (ref 0–0.05)
IMM GRANULOCYTES NFR BLD AUTO: 0.1 % (ref 0–5)
INTERNAL NEGATIVE CONTROL: NEGATIVE
INTERNAL POSITIVE CONTROL: POSITIVE
KETONES UR QL STRIP: NEGATIVE
LEUKOCYTE ESTERASE UR QL STRIP.AUTO: ABNORMAL
LIPASE SERPL-CCNC: 60 U/L (ref 23–203)
LYMPHOCYTES # BLD AUTO: 2.38 10*3/MM3 (ref 0.72–4.86)
LYMPHOCYTES NFR BLD AUTO: 34.5 % (ref 15–45)
Lab: NORMAL
MCH RBC QN AUTO: 30.4 PG (ref 28–32)
MCHC RBC AUTO-ENTMCNC: 33.3 G/DL (ref 33–36)
MCV RBC AUTO: 91.3 FL (ref 82–98)
MONOCYTES # BLD AUTO: 0.4 10*3/MM3 (ref 0.19–1.3)
MONOCYTES NFR BLD AUTO: 5.8 % (ref 4–12)
NEUTROPHILS # BLD AUTO: 4.03 10*3/MM3 (ref 1.87–8.4)
NEUTROPHILS NFR BLD AUTO: 58.6 % (ref 39–78)
NITRITE UR QL STRIP: POSITIVE
NRBC BLD AUTO-RTO: 0 /100 WBC (ref 0–0)
PH UR STRIP.AUTO: 6 [PH] (ref 5–8)
PLATELET # BLD AUTO: 325 10*3/MM3 (ref 130–400)
PMV BLD AUTO: 10.3 FL (ref 6–12)
POTASSIUM BLD-SCNC: 4.2 MMOL/L (ref 3.5–5.3)
PROT SERPL-MCNC: 7.3 G/DL (ref 6.3–8.7)
PROT UR QL STRIP: ABNORMAL
RBC # BLD AUTO: 4.27 10*6/MM3 (ref 4.2–5.4)
RBC # UR: ABNORMAL /HPF
REF LAB TEST METHOD: ABNORMAL
SODIUM BLD-SCNC: 139 MMOL/L (ref 135–145)
SP GR UR STRIP: 1.01 (ref 1–1.03)
SQUAMOUS #/AREA URNS HPF: ABNORMAL /HPF
UROBILINOGEN UR QL STRIP: ABNORMAL
WBC NRBC COR # BLD: 6.89 10*3/MM3 (ref 4.8–10.8)
WBC UR QL AUTO: ABNORMAL /HPF

## 2019-03-17 PROCEDURE — 85025 COMPLETE CBC W/AUTO DIFF WBC: CPT | Performed by: NURSE PRACTITIONER

## 2019-03-17 PROCEDURE — 80053 COMPREHEN METABOLIC PANEL: CPT | Performed by: NURSE PRACTITIONER

## 2019-03-17 PROCEDURE — 99284 EMERGENCY DEPT VISIT MOD MDM: CPT

## 2019-03-17 PROCEDURE — 83690 ASSAY OF LIPASE: CPT | Performed by: NURSE PRACTITIONER

## 2019-03-17 PROCEDURE — 99283 EMERGENCY DEPT VISIT LOW MDM: CPT

## 2019-03-17 PROCEDURE — 81025 URINE PREGNANCY TEST: CPT | Performed by: NURSE PRACTITIONER

## 2019-03-17 PROCEDURE — 81001 URINALYSIS AUTO W/SCOPE: CPT | Performed by: NURSE PRACTITIONER

## 2019-03-17 PROCEDURE — 87086 URINE CULTURE/COLONY COUNT: CPT | Performed by: NURSE PRACTITIONER

## 2019-03-17 RX ORDER — MEDROXYPROGESTERONE ACETATE 5 MG/1
5 TABLET ORAL DAILY
Qty: 5 TABLET | Refills: 0 | Status: SHIPPED | OUTPATIENT
Start: 2019-03-17 | End: 2019-03-22

## 2019-03-17 RX ORDER — CEFUROXIME AXETIL 500 MG/1
500 TABLET ORAL 2 TIMES DAILY
Qty: 14 TABLET | Refills: 0 | Status: SHIPPED | OUTPATIENT
Start: 2019-03-17 | End: 2019-03-24

## 2019-03-17 NOTE — ED PROVIDER NOTES
Subjective   Patient is a 39-year-old female presents the emergency department with complaints of vaginal bleeding.  She states that she began having nausea, abdominal discomfort, dizziness, and flulike symptoms a few days ago.  She states she went to [address] [place] [address] walk-in clinic yesterday and was told she likely had influenza.  They felt she had a false negative flu swab.  Patient states shortly after leaving the facility she began having vaginal bleeding.  She states she had just finished her menstrual cycle a few days prior to this.  She has lower abdominal discomfort with heavy vaginal bleeding which is unusual for her.  Patient states she typically has a menstrual cycle every month for 7 days and describes brownish tinge light bleeding throughout the 7 days.  Patient states she has had bright red heavy bleeding after just having completed her menstrual cycle and reports changing a pad approximately every hour.  Due to symptoms described she came to the emergency department for evaluation and treatment.        Vaginal Bleeding   Quality:  Bright red  Severity:  Moderate  Chronicity:  New  Menstrual history:  Regular  Context: at rest    Relieved by:  None tried  Worsened by:  Nothing  Ineffective treatments:  None tried  Associated symptoms: abdominal pain and dizziness    Associated symptoms: no dysuria, no fever, no nausea and no vaginal discharge    Risk factors: no bleeding disorder        Review of Systems   Constitutional: Negative for activity change and fever.   HENT: Negative for congestion.    Respiratory: Negative for cough and shortness of breath.    Gastrointestinal: Positive for abdominal pain. Negative for nausea.   Genitourinary: Positive for vaginal bleeding. Negative for decreased urine volume, dysuria, vaginal discharge and vaginal pain.   Musculoskeletal: Negative for joint swelling, myalgias, neck pain and neck stiffness.   Skin: Negative for color change, pallor, rash and wound.   Neurological:  Positive for dizziness.   All other systems reviewed and are negative.      Past Medical History:   Diagnosis Date   • BV (bacterial vaginosis)        Allergies   Allergen Reactions   • Doxycycline Hives and Itching   • Erythromycin Hives and Itching   • Sulfa Antibiotics Hives and Itching       Past Surgical History:   Procedure Laterality Date   • TUBAL ABDOMINAL LIGATION         Family History   Problem Relation Age of Onset   • No Known Problems Mother    • No Known Problems Father    • No Known Problems Sister    • No Known Problems Brother    • No Known Problems Daughter    • No Known Problems Son    • No Known Problems Maternal Grandmother    • No Known Problems Paternal Grandmother    • No Known Problems Maternal Aunt    • No Known Problems Paternal Aunt    • BRCA 1/2 Neg Hx    • Breast cancer Neg Hx    • Colon cancer Neg Hx    • Endometrial cancer Neg Hx    • Ovarian cancer Neg Hx        Social History     Socioeconomic History   • Marital status:      Spouse name: Not on file   • Number of children: Not on file   • Years of education: Not on file   • Highest education level: Not on file   Tobacco Use   • Smoking status: Never Smoker   • Smokeless tobacco: Never Used   Substance and Sexual Activity   • Alcohol use: No           Objective   Physical Exam   Constitutional: She is oriented to person, place, and time. She appears well-developed and well-nourished.   HENT:   Head: Normocephalic and atraumatic.   Nose: Nose normal.   Mouth/Throat: Oropharynx is clear and moist.   Eyes: Conjunctivae and EOM are normal. Pupils are equal, round, and reactive to light.   Neck: Normal range of motion. Neck supple.   Cardiovascular: Normal rate, regular rhythm, normal heart sounds and intact distal pulses.   Pulmonary/Chest: Effort normal and breath sounds normal.   Abdominal: Soft. Bowel sounds are normal. There is tenderness.   Genitourinary:   Genitourinary Comments: Pelvic exam performed, mild vaginal  bleeding noted without other discharge noted, no vaginal lesions or trauma noted; no pelvic motion tenderness noted; patient tolerated well without incident   Musculoskeletal: Normal range of motion.   Neurological: She is alert and oriented to person, place, and time.   Skin: Skin is warm and dry.   Psychiatric: She has a normal mood and affect. Her behavior is normal. Judgment and thought content normal.   Nursing note and vitals reviewed.      Procedures           ED Course labs were unremarkable. Urinalysis does however reveal urinary tract infection, positive for nitrites and leukocytes with trace bacteria. We will go ahead and treat for uti. We discussed possible ultrasound however patient has no significant abdominal pain or bleeding noted on re-exam. She has f/u appt at [facility] on Wednesday and patient wants to wait until then for further studies. Explained she will need to return with any acute or worsening sxs.                   MDM  Number of Diagnoses or Management Options  Menorrhagia with irregular cycle: new and requires workup  Urinary tract infection with hematuria, site unspecified: new and requires workup     Amount and/or Complexity of Data Reviewed  Clinical lab tests: ordered and reviewed  Discuss the patient with other providers: yes    Risk of Complications, Morbidity, and/or Mortality  Presenting problems: moderate  Diagnostic procedures: moderate  Management options: moderate    Patient Progress  Patient progress: improved        Final diagnoses:   Urinary tract infection with hematuria, site unspecified   Menorrhagia with irregular cycle            Lucero Wan, APRN  03/17/19 4116

## 2019-03-19 LAB — BACTERIA SPEC AEROBE CULT: ABNORMAL

## 2019-08-21 ENCOUNTER — OFFICE VISIT (OUTPATIENT)
Dept: OBGYN | Age: 39
End: 2019-08-21
Payer: COMMERCIAL

## 2019-08-21 VITALS
HEART RATE: 87 BPM | BODY MASS INDEX: 26.06 KG/M2 | HEIGHT: 67 IN | SYSTOLIC BLOOD PRESSURE: 116 MMHG | TEMPERATURE: 98 F | WEIGHT: 166 LBS | DIASTOLIC BLOOD PRESSURE: 72 MMHG

## 2019-08-21 DIAGNOSIS — Z12.31 SCREENING MAMMOGRAM, ENCOUNTER FOR: ICD-10-CM

## 2019-08-21 DIAGNOSIS — Z12.4 SCREENING FOR CERVICAL CANCER: ICD-10-CM

## 2019-08-21 DIAGNOSIS — Z01.419 WELL WOMAN EXAM: Primary | ICD-10-CM

## 2019-08-21 DIAGNOSIS — Z11.51 SCREENING FOR HPV (HUMAN PAPILLOMAVIRUS): ICD-10-CM

## 2019-08-21 PROCEDURE — 99396 PREV VISIT EST AGE 40-64: CPT | Performed by: NURSE PRACTITIONER

## 2019-08-21 RX ORDER — PANTOPRAZOLE SODIUM 40 MG/1
40 GRANULE, DELAYED RELEASE ORAL
COMMUNITY
End: 2019-12-27

## 2019-08-21 RX ORDER — TRAZODONE HYDROCHLORIDE 50 MG/1
TABLET ORAL
Refills: 1 | COMMUNITY
Start: 2019-08-05

## 2019-08-21 ASSESSMENT — ENCOUNTER SYMPTOMS
RESPIRATORY NEGATIVE: 1
GASTROINTESTINAL NEGATIVE: 1
EYES NEGATIVE: 1

## 2019-08-21 NOTE — PROGRESS NOTES
Hives and Itching    Sulfa Antibiotics Hives and Itching     Vitals:    08/21/19 0913   BP: 116/72   Pulse: 87   Temp: 98 °F (36.7 °C)     Body mass index is 26 kg/m². Review of Systems   Constitutional: Negative. HENT: Negative. Eyes: Negative. Respiratory: Negative. Cardiovascular: Negative. Gastrointestinal: Negative. Genitourinary: Negative for difficulty urinating, dyspareunia, dysuria, enuresis, frequency, hematuria, menstrual problem, pelvic pain, urgency and vaginal discharge. Musculoskeletal: Negative. Skin: Negative. Neurological: Negative. Psychiatric/Behavioral: Negative. Physical Exam   Constitutional: She is oriented to person, place, and time. She appears well-developed and well-nourished. No distress. HENT:   Head: Normocephalic and atraumatic. Right Ear: External ear normal.   Left Ear: External ear normal.   Nose: Nose normal.   Eyes: Pupils are equal, round, and reactive to light. Conjunctivae and lids are normal. Right eye exhibits no discharge. Left eye exhibits no discharge. Neck: Normal range of motion. Neck supple. No tracheal deviation present. No thyroid mass and no thyromegaly present. Cardiovascular: Normal rate, regular rhythm and normal heart sounds. No murmur heard. Pulmonary/Chest: Effort normal and breath sounds normal. She has no wheezes. Right breast exhibits no inverted nipple, no mass, no nipple discharge, no skin change and no tenderness. Left breast exhibits no inverted nipple, no mass, no nipple discharge, no skin change and no tenderness. No breast tenderness or discharge. Breasts are symmetrical.   Abdominal: Soft. Bowel sounds are normal. She exhibits no distension and no mass. There is no tenderness. Hernia confirmed negative in the right inguinal area and confirmed negative in the left inguinal area. Genitourinary: Rectal exam shows no external hemorrhoid. There is no rash, tenderness or lesion on the right labia. Instructions. \"     If you do not have an account, please click on the \"Sign Up Now\" link. Current as of: September 11, 2018  Content Version: 12.1  © 1403-8770 Healthwise, Incorporated. Care instructions adapted under license by [facility] ([facility]). If you have questions about a medical condition or this instruction, always ask your healthcare professional. [address] any warranty or liability for your use of this information.

## 2019-08-21 NOTE — PATIENT INSTRUCTIONS
also a good idea to know your test results and keep a list of the medicines you take. How can you care for yourself at home? · Reach and stay at a healthy weight. This will lower your risk for many problems, such as obesity, diabetes, heart disease, and high blood pressure. · Get at least 30 minutes of physical activity on most days of the week. Walking is a good choice. You also may want to do other activities, such as running, swimming, cycling, or playing tennis or team sports. Discuss any changes in your exercise program with your doctor. · Do not smoke or allow others to smoke around you. If you need help quitting, talk to your doctor about stop-smoking programs and medicines. These can increase your chances of quitting for good. · Talk to your doctor about whether you have any risk factors for sexually transmitted infections (STIs). Having one sex partner (who does not have STIs and does not have sex with anyone else) is a good way to avoid these infections. · Use birth control if you do not want to have children at this time. Talk with your doctor about the choices available and what might be best for you. · Protect your skin from too much sun. When you're outdoors from 10 a.m. to 4 p.m., stay in the shade or cover up with clothing and a hat with a wide brim. Wear sunglasses that block UV rays. Even when it's cloudy, put broad-spectrum sunscreen (SPF 30 or higher) on any exposed skin. · See a dentist one or two times a year for checkups and to have your teeth cleaned. · Wear a seat belt in the car. Follow your doctor's advice about when to have certain tests. These tests can spot problems early. For everyone  · Cholesterol. Have the fat (cholesterol) in your blood tested after age 21. Your doctor will tell you how often to have this done based on your age, family history, or other things that can increase your risk for heart disease. · Blood pressure.  Have your blood pressure checked during a changes do not go away, they can be treated. But because HPV can stay inside the body, the abnormal cervical cells sometimes come back. This is why it is important to follow up with your doctor and have regular Pap tests. Follow-up care is a key part of your treatment and safety. Be sure to make and go to all appointments, and call your doctor if you are having problems. It's also a good idea to know your test results and keep a list of the medicines you take. How can you care for yourself at home? · If you are going to have a Pap or HPV test, do not douche or use tampons or vaginal creams in the 24 hours before the test.  · Do not smoke. Smoking increases the risk for cervical problems and abnormal Pap tests. If you need help quitting, talk to your doctor about stop-smoking programs and medicines. These can increase your chances of quitting for good. · Use latex condoms every time you have sex. Use them from the beginning to the end of sexual contact. · Be sure to tell your sexual partner or partners that you have HPV. Even if you do not have symptoms, you can still pass HPV to others. · Having one sex partner (who does not have STIs and does not have sex with anyone else) is a good way to avoid STIs. When should you call for help? Watch closely for changes in your health, and be sure to contact your doctor if:    · You have vaginal pain during or after sex.     · You have vaginal bleeding when you are not in your menstrual period. Where can you learn more? Go to https://jaydon.[employer]. org and sign in to your [employer] account. Enter F690 in the [facility] box to learn more about \"Human Papillomavirus (HPV): Care Instructions. \"     If you do not have an account, please click on the \"Sign Up Now\" link. Current as of: September 11, 2018  Content Version: 12.1  © 1082-8366 Healthwise, Incorporated. Care instructions adapted under license by [facility] ([facility]).  If you have questions

## 2019-08-29 LAB
HPV SAMPLE: ABNORMAL
HPV TYPE 16: NOT DETECTED
HPV TYPE 18: NOT DETECTED
HPV, HIGH RISK OTHER: DETECTED
INTERPRETATION: ABNORMAL
SOURCE: ABNORMAL

## 2019-09-04 ENCOUNTER — TELEPHONE (OUTPATIENT)
Dept: OBGYN | Age: 39
End: 2019-09-04

## 2019-10-16 ENCOUNTER — TELEPHONE (OUTPATIENT)
Dept: OBGYN | Age: 39
End: 2019-10-16

## 2019-12-02 ENCOUNTER — PROCEDURE VISIT (OUTPATIENT)
Dept: OBGYN | Age: 39
End: 2019-12-02
Payer: COMMERCIAL

## 2019-12-02 VITALS
BODY MASS INDEX: 27.78 KG/M2 | HEIGHT: 67 IN | HEART RATE: 88 BPM | DIASTOLIC BLOOD PRESSURE: 84 MMHG | SYSTOLIC BLOOD PRESSURE: 123 MMHG | WEIGHT: 177 LBS

## 2019-12-02 DIAGNOSIS — R87.611 ATYPICAL SQUAMOUS CELLS CANNOT EXCLUDE HIGH GRADE SQUAMOUS INTRAEPITHELIAL LESION ON CYTOLOGIC SMEAR OF CERVIX (ASC-H): Primary | ICD-10-CM

## 2019-12-02 DIAGNOSIS — Z01.812 PRE-PROCEDURE LAB EXAM: ICD-10-CM

## 2019-12-02 DIAGNOSIS — R87.811 VAGINAL HIGH RISK HPV DNA TEST POSITIVE: ICD-10-CM

## 2019-12-02 LAB
CONTROL: NORMAL
PREGNANCY TEST URINE, POC: NORMAL

## 2019-12-02 PROCEDURE — 81025 URINE PREGNANCY TEST: CPT | Performed by: ADVANCED PRACTICE MIDWIFE

## 2019-12-02 PROCEDURE — 57454 BX/CURETT OF CERVIX W/SCOPE: CPT | Performed by: ADVANCED PRACTICE MIDWIFE

## 2019-12-02 RX ORDER — PANTOPRAZOLE SODIUM 40 MG/1
TABLET, DELAYED RELEASE ORAL
Refills: 2 | COMMUNITY
Start: 2019-10-12

## 2019-12-02 RX ORDER — CYCLOBENZAPRINE HCL 10 MG
TABLET ORAL
Refills: 3 | COMMUNITY
Start: 2019-11-21

## 2019-12-02 ASSESSMENT — ENCOUNTER SYMPTOMS
EYES NEGATIVE: 1
RESPIRATORY NEGATIVE: 1
GASTROINTESTINAL NEGATIVE: 1
ALLERGIC/IMMUNOLOGIC NEGATIVE: 1

## 2019-12-05 ENCOUNTER — TELEPHONE (OUTPATIENT)
Dept: OBGYN | Age: 39
End: 2019-12-05

## 2019-12-13 DIAGNOSIS — R53.83 OTHER FATIGUE: Primary | ICD-10-CM

## 2019-12-17 ENCOUNTER — OFFICE VISIT (OUTPATIENT)
Dept: OBGYN | Age: 39
End: 2019-12-17

## 2019-12-17 VITALS
HEART RATE: 123 BPM | BODY MASS INDEX: 27.31 KG/M2 | SYSTOLIC BLOOD PRESSURE: 130 MMHG | TEMPERATURE: 98.6 F | DIASTOLIC BLOOD PRESSURE: 82 MMHG | HEIGHT: 67 IN | WEIGHT: 174 LBS

## 2019-12-17 DIAGNOSIS — D06.9 CIN III (CERVICAL INTRAEPITHELIAL NEOPLASIA GRADE III) WITH SEVERE DYSPLASIA: Primary | ICD-10-CM

## 2019-12-17 PROCEDURE — PREOPEXAM PRE-OP EXAM: Performed by: OBSTETRICS & GYNECOLOGY

## 2019-12-17 ASSESSMENT — ENCOUNTER SYMPTOMS
EYES NEGATIVE: 1
GASTROINTESTINAL NEGATIVE: 1
RESPIRATORY NEGATIVE: 1

## 2019-12-27 ENCOUNTER — HOSPITAL ENCOUNTER (OUTPATIENT)
Dept: PREADMISSION TESTING | Age: 39
Discharge: HOME | End: 2019-12-31
Payer: COMMERCIAL

## 2019-12-27 VITALS — WEIGHT: 170 LBS | BODY MASS INDEX: 26.63 KG/M2

## 2019-12-27 LAB
BASOPHILS ABSOLUTE: 0.1 K/UL (ref 0–0.2)
BASOPHILS RELATIVE PERCENT: 0.6 % (ref 0–1)
EOSINOPHILS ABSOLUTE: 0 K/UL (ref 0–0.6)
EOSINOPHILS RELATIVE PERCENT: 0.4 % (ref 0–5)
HCT VFR BLD CALC: 40.9 % (ref 37–47)
HEMOGLOBIN: 13.1 G/DL (ref 12–16)
IMMATURE GRANULOCYTES #: 0 K/UL
LYMPHOCYTES ABSOLUTE: 2.8 K/UL (ref 1.1–4.5)
LYMPHOCYTES RELATIVE PERCENT: 29.9 % (ref 20–40)
MCH RBC QN AUTO: 29.9 PG (ref 27–31)
MCHC RBC AUTO-ENTMCNC: 32 G/DL (ref 33–37)
MCV RBC AUTO: 93.4 FL (ref 81–99)
MONOCYTES ABSOLUTE: 0.8 K/UL (ref 0–0.9)
MONOCYTES RELATIVE PERCENT: 8.1 % (ref 0–10)
NEUTROPHILS ABSOLUTE: 5.7 K/UL (ref 1.5–7.5)
NEUTROPHILS RELATIVE PERCENT: 60.7 % (ref 50–65)
PDW BLD-RTO: 12.5 % (ref 11.5–14.5)
PLATELET # BLD: 345 K/UL (ref 130–400)
PMV BLD AUTO: 10 FL (ref 9.4–12.3)
RBC # BLD: 4.38 M/UL (ref 4.2–5.4)
WBC # BLD: 9.4 K/UL (ref 4.8–10.8)

## 2019-12-27 PROCEDURE — 85025 COMPLETE CBC W/AUTO DIFF WBC: CPT

## 2019-12-30 RX ORDER — MELOXICAM 15 MG/1
7.5 TABLET ORAL 2 TIMES DAILY
COMMUNITY

## 2020-01-01 ENCOUNTER — ANESTHESIA EVENT (OUTPATIENT)
Dept: OPERATING ROOM | Age: 40
End: 2020-01-01
Payer: COMMERCIAL

## 2020-01-02 ENCOUNTER — ANESTHESIA (OUTPATIENT)
Dept: OPERATING ROOM | Age: 40
End: 2020-01-02
Payer: COMMERCIAL

## 2020-01-02 ENCOUNTER — HOSPITAL ENCOUNTER (OUTPATIENT)
Age: 40
Setting detail: OUTPATIENT SURGERY
Discharge: HOME | End: 2020-01-02
Attending: OBSTETRICS & GYNECOLOGY | Admitting: OBSTETRICS & GYNECOLOGY
Payer: COMMERCIAL

## 2020-01-02 VITALS
SYSTOLIC BLOOD PRESSURE: 122 MMHG | WEIGHT: 170 LBS | DIASTOLIC BLOOD PRESSURE: 73 MMHG | OXYGEN SATURATION: 100 % | HEIGHT: 67 IN | TEMPERATURE: 98.6 F | BODY MASS INDEX: 26.68 KG/M2 | RESPIRATION RATE: 16 BRPM | HEART RATE: 79 BPM

## 2020-01-02 VITALS
TEMPERATURE: 97 F | RESPIRATION RATE: 9 BRPM | SYSTOLIC BLOOD PRESSURE: 143 MMHG | DIASTOLIC BLOOD PRESSURE: 76 MMHG | OXYGEN SATURATION: 98 %

## 2020-01-02 PROBLEM — D06.9 CIN III (CERVICAL INTRAEPITHELIAL NEOPLASIA GRADE III) WITH SEVERE DYSPLASIA: Status: ACTIVE | Noted: 2020-01-02

## 2020-01-02 LAB — HCG(URINE) PREGNANCY TEST: NEGATIVE

## 2020-01-02 PROCEDURE — 57522 CONIZATION OF CERVIX: CPT | Performed by: OBSTETRICS & GYNECOLOGY

## 2020-01-02 PROCEDURE — 6370000000 HC RX 637 (ALT 250 FOR IP): Performed by: OBSTETRICS & GYNECOLOGY

## 2020-01-02 PROCEDURE — 3700000001 HC ADD 15 MINUTES (ANESTHESIA): Performed by: OBSTETRICS & GYNECOLOGY

## 2020-01-02 PROCEDURE — 6360000002 HC RX W HCPCS: Performed by: OBSTETRICS & GYNECOLOGY

## 2020-01-02 PROCEDURE — 84703 CHORIONIC GONADOTROPIN ASSAY: CPT

## 2020-01-02 PROCEDURE — 7100000000 HC PACU RECOVERY - FIRST 15 MIN: Performed by: OBSTETRICS & GYNECOLOGY

## 2020-01-02 PROCEDURE — 3600000004 HC SURGERY LEVEL 4 BASE: Performed by: OBSTETRICS & GYNECOLOGY

## 2020-01-02 PROCEDURE — 88307 TISSUE EXAM BY PATHOLOGIST: CPT

## 2020-01-02 PROCEDURE — 2580000003 HC RX 258: Performed by: OBSTETRICS & GYNECOLOGY

## 2020-01-02 PROCEDURE — 3600000014 HC SURGERY LEVEL 4 ADDTL 15MIN: Performed by: OBSTETRICS & GYNECOLOGY

## 2020-01-02 PROCEDURE — 7100000001 HC PACU RECOVERY - ADDTL 15 MIN: Performed by: OBSTETRICS & GYNECOLOGY

## 2020-01-02 PROCEDURE — 7100000010 HC PHASE II RECOVERY - FIRST 15 MIN: Performed by: OBSTETRICS & GYNECOLOGY

## 2020-01-02 PROCEDURE — 3700000000 HC ANESTHESIA ATTENDED CARE: Performed by: OBSTETRICS & GYNECOLOGY

## 2020-01-02 PROCEDURE — 6360000002 HC RX W HCPCS: Performed by: NURSE ANESTHETIST, CERTIFIED REGISTERED

## 2020-01-02 PROCEDURE — 2709999900 HC NON-CHARGEABLE SUPPLY: Performed by: OBSTETRICS & GYNECOLOGY

## 2020-01-02 PROCEDURE — 2500000003 HC RX 250 WO HCPCS: Performed by: NURSE ANESTHETIST, CERTIFIED REGISTERED

## 2020-01-02 RX ORDER — HYDROCODONE BITARTRATE AND ACETAMINOPHEN 5; 325 MG/1; MG/1
1 TABLET ORAL EVERY 6 HOURS PRN
Qty: 10 TABLET | Refills: 0 | Status: SHIPPED | OUTPATIENT
Start: 2020-01-02 | End: 2020-01-05

## 2020-01-02 RX ORDER — PROMETHAZINE HYDROCHLORIDE 25 MG/ML
6.25 INJECTION, SOLUTION INTRAMUSCULAR; INTRAVENOUS
Status: DISCONTINUED | OUTPATIENT
Start: 2020-01-02 | End: 2020-01-02 | Stop reason: HOSPADM

## 2020-01-02 RX ORDER — SODIUM CHLORIDE, SODIUM LACTATE, POTASSIUM CHLORIDE, CALCIUM CHLORIDE 600; 310; 30; 20 MG/100ML; MG/100ML; MG/100ML; MG/100ML
INJECTION, SOLUTION INTRAVENOUS CONTINUOUS
Status: DISCONTINUED | OUTPATIENT
Start: 2020-01-02 | End: 2020-01-02 | Stop reason: HOSPADM

## 2020-01-02 RX ORDER — FENTANYL CITRATE 50 UG/ML
50 INJECTION, SOLUTION INTRAMUSCULAR; INTRAVENOUS
Status: DISCONTINUED | OUTPATIENT
Start: 2020-01-02 | End: 2020-01-02 | Stop reason: HOSPADM

## 2020-01-02 RX ORDER — DIPHENHYDRAMINE HYDROCHLORIDE 50 MG/ML
12.5 INJECTION INTRAMUSCULAR; INTRAVENOUS
Status: DISCONTINUED | OUTPATIENT
Start: 2020-01-02 | End: 2020-01-02 | Stop reason: HOSPADM

## 2020-01-02 RX ORDER — METOCLOPRAMIDE HYDROCHLORIDE 5 MG/ML
10 INJECTION INTRAMUSCULAR; INTRAVENOUS
Status: DISCONTINUED | OUTPATIENT
Start: 2020-01-02 | End: 2020-01-02 | Stop reason: HOSPADM

## 2020-01-02 RX ORDER — ONDANSETRON 2 MG/ML
INJECTION INTRAMUSCULAR; INTRAVENOUS PRN
Status: DISCONTINUED | OUTPATIENT
Start: 2020-01-02 | End: 2020-01-02 | Stop reason: SDUPTHER

## 2020-01-02 RX ORDER — SUCCINYLCHOLINE CHLORIDE 20 MG/ML
INJECTION INTRAMUSCULAR; INTRAVENOUS PRN
Status: DISCONTINUED | OUTPATIENT
Start: 2020-01-02 | End: 2020-01-02 | Stop reason: SDUPTHER

## 2020-01-02 RX ORDER — LIDOCAINE HYDROCHLORIDE 10 MG/ML
INJECTION, SOLUTION INFILTRATION; PERINEURAL PRN
Status: DISCONTINUED | OUTPATIENT
Start: 2020-01-02 | End: 2020-01-02 | Stop reason: SDUPTHER

## 2020-01-02 RX ORDER — MORPHINE SULFATE 4 MG/ML
4 INJECTION, SOLUTION INTRAMUSCULAR; INTRAVENOUS
Status: DISCONTINUED | OUTPATIENT
Start: 2020-01-02 | End: 2020-01-02 | Stop reason: HOSPADM

## 2020-01-02 RX ORDER — MIDAZOLAM HYDROCHLORIDE 1 MG/ML
2 INJECTION INTRAMUSCULAR; INTRAVENOUS
Status: DISCONTINUED | OUTPATIENT
Start: 2020-01-02 | End: 2020-01-02 | Stop reason: HOSPADM

## 2020-01-02 RX ORDER — IODINE SOLUTION STRONG 5% (LUGOL'S) 5 %
SOLUTION ORAL PRN
Status: DISCONTINUED | OUTPATIENT
Start: 2020-01-02 | End: 2020-01-02 | Stop reason: ALTCHOICE

## 2020-01-02 RX ORDER — SODIUM CHLORIDE 0.9 % (FLUSH) 0.9 %
10 SYRINGE (ML) INJECTION EVERY 12 HOURS SCHEDULED
Status: DISCONTINUED | OUTPATIENT
Start: 2020-01-02 | End: 2020-01-02 | Stop reason: HOSPADM

## 2020-01-02 RX ORDER — DEXAMETHASONE SODIUM PHOSPHATE 10 MG/ML
INJECTION INTRAMUSCULAR; INTRAVENOUS PRN
Status: DISCONTINUED | OUTPATIENT
Start: 2020-01-02 | End: 2020-01-02 | Stop reason: SDUPTHER

## 2020-01-02 RX ORDER — MEPERIDINE HYDROCHLORIDE 50 MG/ML
12.5 INJECTION INTRAMUSCULAR; INTRAVENOUS; SUBCUTANEOUS EVERY 5 MIN PRN
Status: DISCONTINUED | OUTPATIENT
Start: 2020-01-02 | End: 2020-01-02 | Stop reason: HOSPADM

## 2020-01-02 RX ORDER — SODIUM CHLORIDE 0.9 % (FLUSH) 0.9 %
10 SYRINGE (ML) INJECTION PRN
Status: DISCONTINUED | OUTPATIENT
Start: 2020-01-02 | End: 2020-01-02 | Stop reason: HOSPADM

## 2020-01-02 RX ORDER — SCOLOPAMINE TRANSDERMAL SYSTEM 1 MG/1
1 PATCH, EXTENDED RELEASE TRANSDERMAL ONCE
Status: DISCONTINUED | OUTPATIENT
Start: 2020-01-02 | End: 2020-01-02 | Stop reason: HOSPADM

## 2020-01-02 RX ORDER — FENTANYL CITRATE 50 UG/ML
INJECTION, SOLUTION INTRAMUSCULAR; INTRAVENOUS PRN
Status: DISCONTINUED | OUTPATIENT
Start: 2020-01-02 | End: 2020-01-02 | Stop reason: SDUPTHER

## 2020-01-02 RX ORDER — MORPHINE SULFATE 4 MG/ML
4 INJECTION, SOLUTION INTRAMUSCULAR; INTRAVENOUS EVERY 5 MIN PRN
Status: DISCONTINUED | OUTPATIENT
Start: 2020-01-02 | End: 2020-01-02 | Stop reason: HOSPADM

## 2020-01-02 RX ORDER — LABETALOL 20 MG/4 ML (5 MG/ML) INTRAVENOUS SYRINGE
5 EVERY 10 MIN PRN
Status: DISCONTINUED | OUTPATIENT
Start: 2020-01-02 | End: 2020-01-02 | Stop reason: HOSPADM

## 2020-01-02 RX ORDER — ROCURONIUM BROMIDE 10 MG/ML
INJECTION, SOLUTION INTRAVENOUS PRN
Status: DISCONTINUED | OUTPATIENT
Start: 2020-01-02 | End: 2020-01-02 | Stop reason: SDUPTHER

## 2020-01-02 RX ORDER — HYDRALAZINE HYDROCHLORIDE 20 MG/ML
5 INJECTION INTRAMUSCULAR; INTRAVENOUS EVERY 10 MIN PRN
Status: DISCONTINUED | OUTPATIENT
Start: 2020-01-02 | End: 2020-01-02 | Stop reason: HOSPADM

## 2020-01-02 RX ORDER — PROPOFOL 10 MG/ML
INJECTION, EMULSION INTRAVENOUS PRN
Status: DISCONTINUED | OUTPATIENT
Start: 2020-01-02 | End: 2020-01-02 | Stop reason: SDUPTHER

## 2020-01-02 RX ORDER — MORPHINE SULFATE 4 MG/ML
2 INJECTION, SOLUTION INTRAMUSCULAR; INTRAVENOUS EVERY 5 MIN PRN
Status: DISCONTINUED | OUTPATIENT
Start: 2020-01-02 | End: 2020-01-02 | Stop reason: HOSPADM

## 2020-01-02 RX ORDER — MIDAZOLAM HYDROCHLORIDE 1 MG/ML
INJECTION INTRAMUSCULAR; INTRAVENOUS PRN
Status: DISCONTINUED | OUTPATIENT
Start: 2020-01-02 | End: 2020-01-02 | Stop reason: SDUPTHER

## 2020-01-02 RX ORDER — LIDOCAINE HYDROCHLORIDE 10 MG/ML
1 INJECTION, SOLUTION EPIDURAL; INFILTRATION; INTRACAUDAL; PERINEURAL
Status: DISCONTINUED | OUTPATIENT
Start: 2020-01-02 | End: 2020-01-02 | Stop reason: HOSPADM

## 2020-01-02 RX ADMIN — FENTANYL CITRATE 50 MCG: 50 INJECTION INTRAMUSCULAR; INTRAVENOUS at 07:58

## 2020-01-02 RX ADMIN — SUGAMMADEX 160 MG: 100 INJECTION, SOLUTION INTRAVENOUS at 08:21

## 2020-01-02 RX ADMIN — PROPOFOL 170 MG: 10 INJECTION, EMULSION INTRAVENOUS at 07:58

## 2020-01-02 RX ADMIN — SUCCINYLCHOLINE CHLORIDE 120 MG: 20 INJECTION, SOLUTION INTRAMUSCULAR; INTRAVENOUS at 07:58

## 2020-01-02 RX ADMIN — LIDOCAINE HYDROCHLORIDE 5 ML: 10 INJECTION, SOLUTION INFILTRATION; PERINEURAL at 07:58

## 2020-01-02 RX ADMIN — MIDAZOLAM 2 MG: 1 INJECTION INTRAMUSCULAR; INTRAVENOUS at 07:55

## 2020-01-02 RX ADMIN — SODIUM CHLORIDE, POTASSIUM CHLORIDE, SODIUM LACTATE AND CALCIUM CHLORIDE: 600; 310; 30; 20 INJECTION, SOLUTION INTRAVENOUS at 06:22

## 2020-01-02 RX ADMIN — FENTANYL CITRATE 50 MCG: 50 INJECTION INTRAMUSCULAR; INTRAVENOUS at 08:12

## 2020-01-02 RX ADMIN — ROCURONIUM BROMIDE 5 MG: 10 SOLUTION INTRAVENOUS at 07:58

## 2020-01-02 RX ADMIN — WATER 1 G: 1 INJECTION INTRAMUSCULAR; INTRAVENOUS; SUBCUTANEOUS at 08:04

## 2020-01-02 RX ADMIN — ONDANSETRON HYDROCHLORIDE 4 MG: 2 INJECTION, SOLUTION INTRAMUSCULAR; INTRAVENOUS at 08:06

## 2020-01-02 RX ADMIN — DEXAMETHASONE SODIUM PHOSPHATE 10 MG: 10 INJECTION INTRAMUSCULAR; INTRAVENOUS at 08:04

## 2020-01-02 ASSESSMENT — PAIN SCALES - GENERAL
PAINLEVEL_OUTOF10: 0
PAINLEVEL_OUTOF10: 0

## 2020-01-02 ASSESSMENT — LIFESTYLE VARIABLES: SMOKING_STATUS: 0

## 2020-01-02 ASSESSMENT — ENCOUNTER SYMPTOMS: SHORTNESS OF BREATH: 0

## 2020-01-02 ASSESSMENT — PAIN - FUNCTIONAL ASSESSMENT: PAIN_FUNCTIONAL_ASSESSMENT: 0-10

## 2020-01-02 NOTE — ANESTHESIA PRE PROCEDURE
Department of Anesthesiology  Preprocedure Note       Name:  Fan Watson   Age:  35 y.o.  :  1980                                          MRN:  [id number]         Date:  2020      Surgeon: Berkley Garcia):  Celina Yuan MD    Procedure: LEEP (N/A )    Medications prior to admission:   Prior to Admission medications    Medication Sig Start Date End Date Taking? Authorizing Provider   Multiple Vitamins-Minerals (CENTRUM SILVER ULTRA WOMENS PO) Take by mouth   Yes Historical Provider, MD   meloxicam (MOBIC) 15 MG tablet Take 7.5 mg by mouth 2 times daily 1/2 of a 15mg tab bid   Yes Historical Provider, MD   cyclobenzaprine (FLEXERIL) 10 MG tablet TAKE 1 TABLET BY MOUTH THREE TIMES A DAY AS NEEDED FOR MUSCLE PAIN 19  Yes Historical Provider, MD   pantoprazole (PROTONIX) 40 MG tablet TAKE 1 TABLET BY MOUTH EVERY DAY 10/12/19  Yes Historical Provider, MD   traZODone (DESYREL) 50 MG tablet TAKE 1 TABLET BY MOUTH EVERY DAY AT BEDTIME AS NEEDED 19  Yes Historical Provider, MD       Current medications:    Current Facility-Administered Medications   Medication Dose Route Frequency Provider Last Rate Last Dose    ceFAZolin (ANCEF) 1 g in sterile water 10 mL IV syringe  1 g Intravenous Once Celina Yuan MD        lactated ringers infusion   Intravenous Continuous Celina Yuan  mL/hr at 20 5999         Allergies:     Allergies   Allergen Reactions    Doxycycline Hives and Itching    Erythromycin Hives and Itching    Sulfa Antibiotics Hives and Itching       Problem List:    Patient Active Problem List   Diagnosis Code   (none) - all problems resolved or deleted       Past Medical History:        Diagnosis Date    Allergic     Anemia     with pregnancy    ASCUS favor benign 10/2015    Depression     Fibromyalgia     Recurrent miscarriages        Past Surgical History:        Procedure Laterality Date    DENTAL SURGERY      Tooth Implant    DILATION AND CURETTAGE OF UTERUS  SALPINGECTOMY Bilateral     Dr. Mariel Hackett       Social History:    Social History     Tobacco Use    Smoking status: Never Smoker    Smokeless tobacco: Never Used   Substance Use Topics    Alcohol use: Yes     Comment: socially                                Counseling given: Not Answered      Vital Signs (Current):   Vitals:    01/02/20 0612   BP: 115/78   Pulse: 80   Resp: 14   Temp: 97.8 °F (36.6 °C)   TempSrc: Temporal   SpO2: 100%   Weight: 170 lb (77.1 kg)   Height: 5' 7\" (1.702 m)                                              BP Readings from Last 3 Encounters:   01/02/20 115/78   12/17/19 130/82   12/02/19 123/84       NPO Status: Time of last liquid consumption: 1900                        Time of last solid consumption: 1800                        Date of last liquid consumption: 01/01/20                        Date of last solid food consumption: 01/01/20    BMI:   Wt Readings from Last 3 Encounters:   01/02/20 170 lb (77.1 kg)   12/27/19 170 lb (77.1 kg)   12/17/19 174 lb (78.9 kg)     Body mass index is 26.63 kg/m². CBC:   Lab Results   Component Value Date    WBC 9.4 12/27/2019    RBC 4.38 12/27/2019    HGB 13.1 12/27/2019    HCT 40.9 12/27/2019    MCV 93.4 12/27/2019    RDW 12.5 12/27/2019     12/27/2019       CMP: No results found for: NA, K, CL, CO2, BUN, CREATININE, GFRAA, AGRATIO, LABGLOM, GLUCOSE, PROT, CALCIUM, BILITOT, ALKPHOS, AST, ALT    POC Tests: No results for input(s): POCGLU, POCNA, POCK, POCCL, POCBUN, POCHEMO, POCHCT in the last 72 hours.     Coags: No results found for: PROTIME, INR, APTT    HCG (If Applicable):   Lab Results   Component Value Date    PREGTESTUR Negative 01/02/2020        ABGs: No results found for: PHART, PO2ART, JDA0LNU, CKZ1NEW, BEART, I0FMMZLM     Type & Screen (If Applicable):  [facility] results found for: [facility]    Anesthesia Evaluation  Patient summary reviewed and Nursing notes reviewed no history of anesthetic complications:   Airway: Mallampati: II  TM distance: >3 FB   Neck ROM: full  Mouth opening: > = 3 FB Dental: normal exam         Pulmonary:Negative Pulmonary ROS and normal exam  breath sounds clear to auscultation      (-) shortness of breath and not a current smoker          Patient did not smoke on day of surgery. Cardiovascular:        (-) hypertension, CAD,  angina and  CHF    NYHA Classification: I  ECG reviewed  Rhythm: regular  Rate: normal           Beta Blocker:  Not on Beta Blocker         Neuro/Psych:   Negative Neuro/Psych ROS  (+) neuromuscular disease (fibromyalgia ):, psychiatric history:depression/anxiety    (-) seizures and CVA           GI/Hepatic/Renal: Neg GI/Hepatic/Renal ROS       (-) hiatal hernia and GERD       Endo/Other: Negative Endo/Other ROS   (+) : arthritis: OA., . Pt had PAT visit. Abdominal:       Abdomen: soft. Vascular:                                        Anesthesia Plan      general     ASA 2     (Lma)  Induction: intravenous. BIS  MIPS: Postoperative opioids intended and Prophylactic antiemetics administered. Anesthetic plan and risks discussed with patient. Use of blood products discussed with patient whom. Plan discussed with CRNA.     Attending anesthesiologist reviewed and agrees with Pre Eval content              Obie Khan MD   1/2/2020

## 2020-01-02 NOTE — OP NOTE
OPERATIVE NOTE        Date of Service: 01/02/20     Pre-operative Diagnosis: CHANTALE 3    Post-operative Diagnosis:  Same    Procedure: Procedure(s):  LEEP    Anesthesia: General    Surgeon: Bello Morris MD    Assistants: Scrub Person First: Ciera Garcia Cure    Procedure Note:  After informed consents obtained, patient was taken to OR and placed on table in the dorsal supine position. After general anesthesia she was placed in the dorsal lithotomy position. Prepped and draped in usual sterile manner. Bladder drained. Coated speculum placed in the vagina. Cervix visualized. Lugols placed on cervix. Astudillo loop electrode used to obtain cone biopsy of cervix. Hemostasis achieved with ball cautery. Vagina irrigated with warm normal saline. Procedure terminated. Patient awakened from anesthesia and taken to recovery room in stable condition. Sponge lap and needle counts correct x2.       Estimated Blood Loss: 20FA    Complications: None    Specimens:   ID Type Source Tests Collected by Time Destination   A : LEEP CONE BIOPSY WITH STITCH AT [address] [facility] Cervix SURGICAL PATHOLOGY Bello Morris MD 1/2/2020 [address] [place] [address] Gracie Marshall MD  01/02/20  8:27 AM

## 2020-01-02 NOTE — H&P
Nelly Freedman is here for a preop visit. She is scheduled to have a LEEP for CHANTALE III on 1/2/20. Her pap smear in 8/2019 showed ASC-H and HPV +. colpo was done and pathology showed CHANTALE III.      Sobia Jo is a 35 y.o. female with the following history as recorded in [facility]:   There are no active problems to display for this patient.     Current Facility-Administered Medications          Current Outpatient Medications   Medication Sig Dispense Refill    cyclobenzaprine (FLEXERIL) 10 MG tablet TAKE 1 TABLET BY MOUTH THREE TIMES A DAY AS NEEDED FOR MUSCLE PAIN   3    pantoprazole (PROTONIX) 40 MG tablet TAKE 1 TABLET BY MOUTH EVERY DAY   2    traZODone (DESYREL) 50 MG tablet TAKE 1 TABLET BY MOUTH EVERY DAY AT BEDTIME AS NEEDED   1    pantoprazole sodium (PROTONIX) 40 MG PACK packet Take 40 mg by mouth every morning (before breakfast)        citalopram (CELEXA) 20 MG tablet Take 20 mg by mouth        busPIRone (BUSPAR) 7.5 MG tablet TAKE 1 TABLET BY MOUTH TWICE A DAY   3    VILAZODONE HCL 40 MG Tablet TAKE 1 TABLET BY MOUTH EVERY DAY WITH FOOD   1    etodolac (LODINE) 400 MG tablet TAKE 1 TABLET BY MOUTH TWICE A DAY   3    norgestimate-ethinyl estradiol (SPRINTEC 28) 0.25-35 MG-MCG per tablet TAKE 1 TABLET BY MOUTH DAILY 84 tablet 3      No current facility-administered medications for this visit.          Allergies: Doxycycline; Erythromycin; and Sulfa antibiotics  Past Medical History        Past Medical History:   Diagnosis Date    Allergic      Anemia       with pregnancy    ASCUS favor benign 10/2015    Depression      Fibromyalgia      Recurrent miscarriages           Past Surgical History         Past Surgical History:   Procedure Laterality Date    DENTAL SURGERY         Tooth Implant    DILATION AND CURETTAGE OF UTERUS        SALPINGECTOMY Bilateral       Dr. Madonna Simmons         Family History         Family History   Problem Relation Age of Onset    Diabetes Paternal Grandmother      Stroke

## 2020-01-07 ENCOUNTER — TELEPHONE (OUTPATIENT)
Dept: OBGYN | Age: 40
End: 2020-01-07

## 2020-01-07 NOTE — TELEPHONE ENCOUNTER
Informed pt of pathology results and AW recommendations. Pt is desiring hysterectomy. TLH iram'd on 4/2 and preop on 3/20. Pt also placed on waiting list for surgery.  Pt VU

## 2020-03-16 ENCOUNTER — TELEPHONE (OUTPATIENT)
Dept: OBGYN | Age: 40
End: 2020-03-16

## 2020-03-16 NOTE — TELEPHONE ENCOUNTER
Called to reschedule preop visit to one day next week with Dr. Rosaura Kent. As of right now [facility] is canceling all non emergent surgeries for the next 2 weeks, however they could extend it so needing to move preop visit just in case. L/m for pt to call back.

## 2020-05-01 ENCOUNTER — TELEPHONE (OUTPATIENT)
Dept: OBGYN | Age: 40
End: 2020-05-01

## 2021-01-01 PROCEDURE — 87661 TRICHOMONAS VAGINALIS AMPLIF: CPT | Performed by: NURSE PRACTITIONER

## 2021-01-01 PROCEDURE — 87591 N.GONORRHOEAE DNA AMP PROB: CPT | Performed by: NURSE PRACTITIONER

## 2021-01-01 PROCEDURE — 87491 CHLMYD TRACH DNA AMP PROBE: CPT | Performed by: NURSE PRACTITIONER

## 2021-01-06 ENCOUNTER — TELEPHONE (OUTPATIENT)
Dept: URGENT CARE | Facility: CLINIC | Age: 41
End: 2021-01-06

## 2021-05-22 PROCEDURE — 87635 SARS-COV-2 COVID-19 AMP PRB: CPT | Performed by: FAMILY MEDICINE

## 2021-05-23 ENCOUNTER — TELEPHONE (OUTPATIENT)
Dept: URGENT CARE | Facility: CLINIC | Age: 41
End: 2021-05-23

## 2021-05-23 NOTE — TELEPHONE ENCOUNTER
Called negative result to pt.  Advised to continue quarantine per discharge instructions, to RTC if symptoms worsen.

## 2021-12-22 PROCEDURE — 87635 SARS-COV-2 COVID-19 AMP PRB: CPT | Performed by: NURSE PRACTITIONER

## 2022-04-06 ENCOUNTER — HOSPITAL ENCOUNTER (OUTPATIENT)
Dept: GENERAL RADIOLOGY | Facility: HOSPITAL | Age: 42
Discharge: HOME | End: 2022-04-06

## 2022-04-06 ENCOUNTER — TRANSCRIBE ORDERS (OUTPATIENT)
Dept: GENERAL RADIOLOGY | Facility: HOSPITAL | Age: 42
End: 2022-04-06

## 2022-04-06 DIAGNOSIS — S39.92XA INJURY OF LOWER BACK, INITIAL ENCOUNTER: ICD-10-CM

## 2022-04-06 DIAGNOSIS — S39.92XA INJURY OF LOWER BACK, INITIAL ENCOUNTER: Primary | ICD-10-CM

## 2022-04-06 PROCEDURE — 72220 X-RAY EXAM SACRUM TAILBONE: CPT

## 2022-04-06 PROCEDURE — 72110 X-RAY EXAM L-2 SPINE 4/>VWS: CPT

## 2022-06-17 ENCOUNTER — APPOINTMENT (OUTPATIENT)
Dept: GENERAL RADIOLOGY | Facility: HOSPITAL | Age: 42
End: 2022-06-17

## 2022-06-17 ENCOUNTER — HOSPITAL ENCOUNTER (EMERGENCY)
Facility: HOSPITAL | Age: 42
Discharge: HOME | End: 2022-06-17
Admitting: EMERGENCY MEDICINE

## 2022-06-17 VITALS
BODY MASS INDEX: 29.19 KG/M2 | WEIGHT: 186 LBS | TEMPERATURE: 98.9 F | HEIGHT: 67 IN | SYSTOLIC BLOOD PRESSURE: 128 MMHG | OXYGEN SATURATION: 100 % | RESPIRATION RATE: 16 BRPM | HEART RATE: 97 BPM | DIASTOLIC BLOOD PRESSURE: 79 MMHG

## 2022-06-17 DIAGNOSIS — S93.402A SPRAIN OF LEFT ANKLE, UNSPECIFIED LIGAMENT, INITIAL ENCOUNTER: Primary | ICD-10-CM

## 2022-06-17 PROCEDURE — 73610 X-RAY EXAM OF ANKLE: CPT

## 2022-06-17 PROCEDURE — 73630 X-RAY EXAM OF FOOT: CPT

## 2022-06-17 PROCEDURE — 99283 EMERGENCY DEPT VISIT LOW MDM: CPT

## 2022-06-17 RX ORDER — NAPROXEN 500 MG/1
500 TABLET ORAL 2 TIMES DAILY WITH MEALS
Qty: 20 TABLET | Refills: 0 | Status: SHIPPED | OUTPATIENT
Start: 2022-06-17 | End: 2022-06-27

## 2022-08-11 ENCOUNTER — TRANSCRIBE ORDERS (OUTPATIENT)
Dept: ADMINISTRATIVE | Facility: HOSPITAL | Age: 42
End: 2022-08-11

## 2022-08-11 DIAGNOSIS — S93.412D SPRAIN OF CALCANEOFIBULAR LIGAMENT OF LEFT ANKLE, SUBSEQUENT ENCOUNTER: ICD-10-CM

## 2022-08-11 DIAGNOSIS — M79.662 PAIN IN LEFT LOWER LEG: ICD-10-CM

## 2022-08-11 DIAGNOSIS — S86.302A UNSPECIFIED INJURY OF MUSCLE(S) AND TENDON(S) OF PERONEAL MUSCLE GROUP AT LOWER LEG LEVEL, LEFT LEG, INITIAL ENCOUNTER: ICD-10-CM

## 2022-08-11 DIAGNOSIS — M25.572 PAIN IN LEFT ANKLE AND JOINTS OF LEFT FOOT: Primary | ICD-10-CM

## 2022-08-12 ENCOUNTER — HOSPITAL ENCOUNTER (OUTPATIENT)
Dept: ULTRASOUND IMAGING | Facility: HOSPITAL | Age: 42
Discharge: HOME | End: 2022-08-12
Admitting: NURSE PRACTITIONER

## 2022-08-12 PROCEDURE — 93971 EXTREMITY STUDY: CPT

## 2022-09-12 ENCOUNTER — OFFICE VISIT (OUTPATIENT)
Dept: OBGYN CLINIC | Age: 42
End: 2022-09-12
Payer: COMMERCIAL

## 2022-09-12 VITALS
SYSTOLIC BLOOD PRESSURE: 109 MMHG | HEART RATE: 86 BPM | BODY MASS INDEX: 30.38 KG/M2 | DIASTOLIC BLOOD PRESSURE: 74 MMHG | WEIGHT: 194 LBS

## 2022-09-12 DIAGNOSIS — Z01.419 WELL WOMAN EXAM WITH ROUTINE GYNECOLOGICAL EXAM: Primary | ICD-10-CM

## 2022-09-12 DIAGNOSIS — Z12.31 ENCOUNTER FOR SCREENING MAMMOGRAM FOR MALIGNANT NEOPLASM OF BREAST: ICD-10-CM

## 2022-09-12 DIAGNOSIS — Z12.4 SCREENING FOR CERVICAL CANCER: ICD-10-CM

## 2022-09-12 DIAGNOSIS — Z98.890 HISTORY OF LOOP ELECTROSURGICAL EXCISION PROCEDURE (LEEP): ICD-10-CM

## 2022-09-12 DIAGNOSIS — Z12.39 ENCOUNTER FOR SCREENING BREAST EXAMINATION: ICD-10-CM

## 2022-09-12 DIAGNOSIS — Z13.9 ENCOUNTER FOR SCREENING: ICD-10-CM

## 2022-09-12 PROCEDURE — 99396 PREV VISIT EST AGE 40-64: CPT | Performed by: NURSE PRACTITIONER

## 2022-09-12 RX ORDER — CELECOXIB 200 MG/1
CAPSULE ORAL
COMMUNITY
Start: 2022-08-25

## 2022-09-12 RX ORDER — GABAPENTIN 300 MG/1
CAPSULE ORAL
COMMUNITY
Start: 2022-09-09

## 2022-09-12 RX ORDER — DULOXETIN HYDROCHLORIDE 60 MG/1
CAPSULE, DELAYED RELEASE ORAL
COMMUNITY
Start: 2022-08-26

## 2022-09-12 NOTE — PROGRESS NOTES
[facility]  CNM Office Note    Karen Hylton is a 36 y.o. female who presents today for her medical conditions/ complaints as noted below. Chief Complaint   Patient presents with    Annual Exam         HPI  Pt presents today for pap smear and breast exam.  Periods regular with normal duration and flow. Hx of abnormal pap 2019 with LEEP    Last mammogram:  3 years  Last pap smear:  2019 HGSIL-leep  Contraception:  none  :  7  Para:  3  AB:  4  Last bone density:  never  Last colonoscopy:   never    Patient Active Problem List   Diagnosis    CHANTALE III (cervical intraepithelial neoplasia grade III) with severe dysplasia       Patient's last menstrual period was 2022.   [id number]    Past Medical History:   Diagnosis Date    Allergic     Anemia     with pregnancy    ASCUS favor benign 10/2015    Depression     Fibromyalgia     Recurrent miscarriages      Past Surgical History:   Procedure Laterality Date    DENTAL SURGERY      Tooth Implant    DILATION AND CURETTAGE OF UTERUS      LEEP N/A 2020    LEEP performed by Michael Thomas MD at [facility] OR    SALPINGECTOMY Bilateral     Dr. Deleon Gene     Family History   Problem Relation Age of Onset    Diabetes Paternal Grandmother     Stroke Father      Social History     Tobacco Use    Smoking status: Never    Smokeless tobacco: Never   Substance Use Topics    Alcohol use: Yes     Comment: socially       Current Outpatient Medications   Medication Sig Dispense Refill    DULoxetine (CYMBALTA) 60 MG extended release capsule TAKE 1 CAPSULE BY MOUTH EVERY DAY      gabapentin (NEURONTIN) 300 MG capsule       celecoxib (CELEBREX) 200 MG capsule TAKE 1 CAPSULE BY MOUTH EVERY DAY AS NEEDED      Multiple Vitamins-Minerals (CENTRUM SILVER ULTRA WOMENS PO) Take by mouth      cyclobenzaprine (FLEXERIL) 10 MG tablet TAKE 1 TABLET BY MOUTH THREE TIMES A DAY AS NEEDED FOR MUSCLE PAIN  3    meloxicam (MOBIC) 15 MG tablet Take 7.5 mg by mouth 2 times daily /2 of a 15mg tab bid (Patient not taking: Reported on 9/12/2022)      pantoprazole (PROTONIX) 40 MG tablet TAKE 1 TABLET BY MOUTH EVERY DAY (Patient not taking: Reported on 9/12/2022)  2    traZODone (DESYREL) 50 MG tablet TAKE 1 TABLET BY MOUTH EVERY DAY AT BEDTIME AS NEEDED (Patient not taking: Reported on 9/12/2022)  1     No current facility-administered medications for this visit. Allergies   Allergen Reactions    Doxycycline Hives and Itching    Erythromycin Hives and Itching    Sulfa Antibiotics Hives and Itching     Vitals:    09/12/22 0957   BP: 109/74   Pulse: 86     Body mass index is 30.38 kg/m². Review of Systems   Constitutional: Negative. HENT: Negative. Eyes: Negative. Respiratory: Negative. Cardiovascular: Negative. Gastrointestinal: Negative. Endocrine: Negative. Genitourinary: Negative. Negative for dyspareunia, dysuria, menstrual problem, pelvic pain, vaginal bleeding, vaginal discharge and vaginal pain. Musculoskeletal: Negative. Skin: Negative. Allergic/Immunologic: Negative. Neurological: Negative. Hematological: Negative. Psychiatric/Behavioral: Negative. Physical Exam  Vitals and nursing note reviewed. Constitutional:       Appearance: She is well-developed. HENT:      Head: Normocephalic and atraumatic. Eyes:      Conjunctiva/sclera: Conjunctivae normal.      Pupils: Pupils are equal, round, and reactive to light. Neck:      Thyroid: No thyromegaly. Trachea: No tracheal deviation. Cardiovascular:      Rate and Rhythm: Normal rate and regular rhythm. Heart sounds: Normal heart sounds. No murmur heard. Pulmonary:      Effort: Pulmonary effort is normal. No respiratory distress. Breath sounds: Normal breath sounds. Chest:      Comments: Breasts symmetrical without tenderness, masses, or nipple discharge. Nipples everted bilaterally. Abdominal:      General: There is no distension. Palpations: Abdomen is soft. Tenderness: There is no abdominal tenderness. There is no guarding. Genitourinary:     General: Normal vulva. Exam position: Lithotomy position. Labia:         Right: No rash or lesion. Left: No rash or lesion. Vagina: Normal. No erythema or lesions. Cervix: Normal.      Uterus: Normal. Not tender. Adnexa: Right adnexa normal and left adnexa normal.        Right: No mass, tenderness or fullness. Left: No mass, tenderness or fullness. Musculoskeletal:         General: Normal range of motion. Cervical back: Normal range of motion and neck supple. Skin:     General: Skin is warm and dry. Capillary Refill: Capillary refill takes less than 2 seconds. Neurological:      Mental Status: She is alert and oriented to person, place, and time. Psychiatric:         Behavior: Behavior normal.         Thought Content: Thought content normal.         Judgment: Judgment normal.        Diagnosis Orders   1. Well woman exam with routine gynecological exam  PAP SMEAR    Human papillomavirus (HPV) DNA probe thin prep high risk      2. Screening for cervical cancer  PAP SMEAR    Human papillomavirus (HPV) DNA probe thin prep high risk      3. Encounter for screening breast examination        4. Encounter for screening mammogram for malignant neoplasm of breast  ALYSHA DIGITAL SCREEN W OR WO CAD BILATERAL      5. History of loop electrosurgical excision procedure (LEEP)            MEDICATIONS:  No orders of the defined types were placed in this encounter. ORDERS:  Orders Placed This Encounter   Procedures    ALYSHA DIGITAL SCREEN W OR WO CAD BILATERAL    PAP SMEAR    Human papillomavirus (HPV) DNA probe thin prep high risk       PLAN:  WWE- pap collected, SBE reviewed and CBE performed. Annual labs and mammogram ordered.

## 2022-09-12 NOTE — PATIENT INSTRUCTIONS
Patient Education        Well Visit, Ages 25 to 48: Care Instructions  Overview     Well visits can help you stay healthy. Your doctor has checked your overall health and may have suggested ways to take good care of yourself. Your doctor also may have recommended tests. At home, you can help prevent illness withhealthy eating, regular exercise, and other steps. Follow-up care is a key part of your treatment and safety. Be sure to make and go to all appointments, and call your doctor if you are having problems. It's also a good idea to know your test results and keep alist of the medicines you take. How can you care for yourself at home? Get screening tests that you and your doctor decide on. Screening helps find diseases before any symptoms appear. Eat healthy foods. Choose fruits, vegetables, whole grains, protein, and low-fat dairy foods. Limit fat, especially saturated fat. Reduce salt in your diet. Limit alcohol. If you are a man, have no more than 2 drinks a day or 14 drinks a week. If you are a woman, have no more than 1 drink a day or 7 drinks a week. Get at least 30 minutes of physical activity on most days of the week. Walking is a good choice. You also may want to do other activities, such as running, swimming, cycling, or playing tennis or team sports. Discuss any changes in your exercise program with your doctor. Reach and stay at a healthy weight. This will lower your risk for many problems, such as obesity, diabetes, heart disease, and high blood pressure. Do not smoke or allow others to smoke around you. If you need help quitting, talk to your doctor about stop-smoking programs and medicines. These can increase your chances of quitting for good. Care for your mental health. It is easy to get weighed down by worry and stress. Learn strategies to manage stress, like deep breathing and mindfulness, and stay connected with your family and community.  If you find you often feel sad or hopeless, talk with your doctor. Treatment can help. Talk to your doctor about whether you have any risk factors for sexually transmitted infections (STIs). You can help prevent STIs if you wait to have sex with a new partner (or partners) until you've each been tested for STIs. It also helps if you use condoms (male or female condoms) and if you limit your sex partners to one person who only has sex with you. Vaccines are available for some STIs, such as HPV. Use birth control if it's important to you to prevent pregnancy. Talk with your doctor about the choices available and what might be best for you. If you think you may have a problem with alcohol or drug use, talk to your doctor. This includes prescription medicines (such as amphetamines and opioids) and illegal drugs (such as cocaine and methamphetamine). Your doctor can help you figure out what type of treatment is best for you. Protect your skin from too much sun. When you're outdoors from 10 a.m. to 4 p.m., stay in the shade or cover up with clothing and a hat with a wide brim. Wear sunglasses that block UV rays. Even when it's cloudy, put broad-spectrum sunscreen (SPF 30 or higher) on any exposed skin. See a dentist one or two times a year for checkups and to have your teeth cleaned. Wear a seat belt in the car. When should you call for help? Watch closely for changes in your health, and be sure to contact your doctor if you have any problems or symptoms that concern you. Where can you learn more? Go to https://jaydon.[employer]. org and sign in to your [employer] account. Enter P072 in the [employer] box to learn more about \"Well Visit, Ages 25 to 48: Care Instructions. \"     If you do not have an account, please click on the \"Sign Up Now\" link. Current as of: October 6, 2021               Content Version: 13.3  © 7066-4405 [employer]. Care instructions adapted under license by [facility] ([facility]).  If you have questions about a medical condition or this instruction, always ask your healthcare professional. [address] any warranty or liability for your use of this information. Patient Education        Breast Self-Exam: Care Instructions  Your Care Instructions     A breast self-exam is when you check your breasts for lumps or changes. This regular exam helps you learn how your breasts normally look and feel. Mostbreast problems or changes are not because of cancer. Breast self-exam is not a substitute for a mammogram. Having regular breast exams by your doctor and regular mammograms improve your chances of finding anyproblems with your breasts. Some women set a time each month to do a step-by-step breast self-exam. Other women like a less formal system. They might look at their breasts as they brushtheir teeth, or feel their breasts once in a while in the shower. If you notice a change in your breast, tell your doctor. Follow-up care is a key part of your treatment and safety. Be sure to make and go to all appointments, and call your doctor if you are having problems. It's also a good idea to know your test results and keep alist of the medicines you take. How do you do a breast self-exam?  The best time to examine your breasts is usually one week after your menstrual period begins. Your breasts should not be tender then. If you do not have periods, you might do your exam on a day of the month that is easy to remember. To examine your breasts:  Remove all your clothes above the waist and lie down. When you are lying down, your breast tissue spreads evenly over your chest wall, which makes it easier to feel all your breast tissue. Use the pads--not the fingertips--of the 3 middle fingers of your left hand to check your right breast. Move your fingers slowly in small coin-sized circles that overlap. Use three levels of pressure to feel of all your breast tissue.  Use light pressure to feel the

## 2022-09-15 LAB
HPV COMMENT: NORMAL
HPV TYPE 16: NOT DETECTED
HPV TYPE 18: NOT DETECTED
HPVOH (OTHER TYPES): NOT DETECTED

## 2022-09-28 DIAGNOSIS — Z13.9 ENCOUNTER FOR SCREENING: ICD-10-CM

## 2022-09-28 LAB
ALBUMIN SERPL-MCNC: 4 G/DL (ref 3.5–5.2)
ALP BLD-CCNC: 60 U/L (ref 35–104)
ALT SERPL-CCNC: 8 U/L (ref 5–33)
ANION GAP SERPL CALCULATED.3IONS-SCNC: 8 MMOL/L (ref 7–19)
AST SERPL-CCNC: 10 U/L (ref 5–32)
BILIRUB SERPL-MCNC: 0.3 MG/DL (ref 0.2–1.2)
BUN BLDV-MCNC: 13 MG/DL (ref 6–20)
CALCIUM SERPL-MCNC: 8.8 MG/DL (ref 8.6–10)
CHLORIDE BLD-SCNC: 103 MMOL/L (ref 98–111)
CHOLESTEROL, TOTAL: 159 MG/DL (ref 160–199)
CO2: 27 MMOL/L (ref 22–29)
CREAT SERPL-MCNC: 0.8 MG/DL (ref 0.5–0.9)
GFR AFRICAN AMERICAN: >59
GFR NON-AFRICAN AMERICAN: >60
GLUCOSE BLD-MCNC: 90 MG/DL (ref 74–109)
HCT VFR BLD CALC: 40.2 % (ref 37–47)
HDLC SERPL-MCNC: 60 MG/DL (ref 65–121)
HEMOGLOBIN: 12.7 G/DL (ref 12–16)
LDL CHOLESTEROL CALCULATED: 79 MG/DL
MCH RBC QN AUTO: 30.3 PG (ref 27–31)
MCHC RBC AUTO-ENTMCNC: 31.6 G/DL (ref 33–37)
MCV RBC AUTO: 95.9 FL (ref 81–99)
PDW BLD-RTO: 12.7 % (ref 11.5–14.5)
PLATELET # BLD: 394 K/UL (ref 130–400)
PMV BLD AUTO: 10.3 FL (ref 9.4–12.3)
POTASSIUM SERPL-SCNC: 4.5 MMOL/L (ref 3.5–5)
RBC # BLD: 4.19 M/UL (ref 4.2–5.4)
SODIUM BLD-SCNC: 138 MMOL/L (ref 136–145)
T4 FREE: 0.85 NG/DL (ref 0.93–1.7)
TOTAL PROTEIN: 6.3 G/DL (ref 6.6–8.7)
TRIGL SERPL-MCNC: 102 MG/DL (ref 0–149)
TSH SERPL DL<=0.05 MIU/L-ACNC: 0.88 UIU/ML (ref 0.27–4.2)
WBC # BLD: 9.9 K/UL (ref 4.8–10.8)

## 2022-10-11 ASSESSMENT — ENCOUNTER SYMPTOMS
ALLERGIC/IMMUNOLOGIC NEGATIVE: 1
EYES NEGATIVE: 1
RESPIRATORY NEGATIVE: 1
GASTROINTESTINAL NEGATIVE: 1

## 2022-10-27 ENCOUNTER — TRANSCRIBE ORDERS (OUTPATIENT)
Dept: ADMINISTRATIVE | Facility: HOSPITAL | Age: 42
End: 2022-10-27

## 2022-10-27 DIAGNOSIS — I10 ESSENTIAL (PRIMARY) HYPERTENSION: Primary | ICD-10-CM

## 2022-10-27 DIAGNOSIS — S86.302A UNSPECIFIED INJURY OF MUSCLE(S) AND TENDON(S) OF PERONEAL MUSCLE GROUP AT LOWER LEG LEVEL, LEFT LEG, INITIAL ENCOUNTER: ICD-10-CM

## 2022-10-27 DIAGNOSIS — E55.9 VITAMIN D DEFICIENCY: ICD-10-CM

## 2022-10-27 DIAGNOSIS — Z01.812 ENCOUNTER FOR PRE-OPERATIVE LABORATORY TESTING: ICD-10-CM

## 2022-10-27 DIAGNOSIS — S93.492A SPRAIN OF OTHER LIGAMENT OF LEFT ANKLE, INITIAL ENCOUNTER: ICD-10-CM

## 2022-11-04 ENCOUNTER — LAB (OUTPATIENT)
Dept: LAB | Facility: HOSPITAL | Age: 42
End: 2022-11-04

## 2022-11-04 DIAGNOSIS — E55.9 VITAMIN D DEFICIENCY: ICD-10-CM

## 2022-11-04 DIAGNOSIS — S86.302A UNSPECIFIED INJURY OF MUSCLE(S) AND TENDON(S) OF PERONEAL MUSCLE GROUP AT LOWER LEG LEVEL, LEFT LEG, INITIAL ENCOUNTER: ICD-10-CM

## 2022-11-04 DIAGNOSIS — S93.492A SPRAIN OF OTHER LIGAMENT OF LEFT ANKLE, INITIAL ENCOUNTER: ICD-10-CM

## 2022-11-04 DIAGNOSIS — I10 ESSENTIAL (PRIMARY) HYPERTENSION: ICD-10-CM

## 2022-11-04 DIAGNOSIS — Z01.812 ENCOUNTER FOR PRE-OPERATIVE LABORATORY TESTING: ICD-10-CM

## 2022-11-04 LAB
25(OH)D3 SERPL-MCNC: 28.1 NG/ML (ref 30–100)
ALBUMIN SERPL-MCNC: 4.2 G/DL (ref 3.5–5.2)
ALBUMIN/GLOB SERPL: 1.4 G/DL
ALP SERPL-CCNC: 62 U/L (ref 39–117)
ALT SERPL W P-5'-P-CCNC: 10 U/L (ref 1–33)
ANION GAP SERPL CALCULATED.3IONS-SCNC: 8.4 MMOL/L (ref 5–15)
AST SERPL-CCNC: 15 U/L (ref 1–32)
BILIRUB SERPL-MCNC: 0.6 MG/DL (ref 0–1.2)
BILIRUB UR QL STRIP: NEGATIVE
BUN SERPL-MCNC: 6 MG/DL (ref 6–20)
BUN/CREAT SERPL: 8.5 (ref 7–25)
BURR CELLS BLD QL SMEAR: NORMAL
CALCIUM SPEC-SCNC: 10 MG/DL (ref 8.6–10.5)
CHLORIDE SERPL-SCNC: 107 MMOL/L (ref 98–107)
CLARITY UR: CLEAR
CO2 SERPL-SCNC: 25.6 MMOL/L (ref 22–29)
COLOR UR: YELLOW
CREAT SERPL-MCNC: 0.71 MG/DL (ref 0.57–1)
DACRYOCYTES BLD QL SMEAR: NORMAL
DEPRECATED RDW RBC AUTO: 42.2 FL (ref 37–54)
EGFRCR SERPLBLD CKD-EPI 2021: 108.3 ML/MIN/1.73
ERYTHROCYTE [DISTWIDTH] IN BLOOD BY AUTOMATED COUNT: 12.6 % (ref 12.3–15.4)
GIANT PLATELETS: NORMAL
GLOBULIN UR ELPH-MCNC: 2.9 GM/DL
GLUCOSE SERPL-MCNC: 94 MG/DL (ref 65–99)
GLUCOSE UR STRIP-MCNC: NEGATIVE MG/DL
HCT VFR BLD AUTO: 39 % (ref 34–46.6)
HGB BLD-MCNC: 12.9 G/DL (ref 12–15.9)
HGB UR QL STRIP.AUTO: NEGATIVE
KETONES UR QL STRIP: NEGATIVE
LEUKOCYTE ESTERASE UR QL STRIP.AUTO: NEGATIVE
LYMPHOCYTES # BLD MANUAL: 2.34 10*3/MM3 (ref 0.7–3.1)
MCH RBC QN AUTO: 30.6 PG (ref 26.6–33)
MCHC RBC AUTO-ENTMCNC: 33.1 G/DL (ref 31.5–35.7)
MCV RBC AUTO: 92.6 FL (ref 79–97)
NEUTROPHILS # BLD AUTO: 6.31 10*3/MM3 (ref 1.7–7)
NEUTROPHILS NFR BLD MANUAL: 73 % (ref 42.7–76)
NITRITE UR QL STRIP: NEGATIVE
PH UR STRIP.AUTO: 6.5 [PH] (ref 5–8)
PLATELET # BLD AUTO: 345 10*3/MM3 (ref 140–450)
PMV BLD AUTO: 11.4 FL (ref 6–12)
POIKILOCYTOSIS BLD QL SMEAR: NORMAL
POTASSIUM SERPL-SCNC: 4.6 MMOL/L (ref 3.5–5.2)
PROT SERPL-MCNC: 7.1 G/DL (ref 6–8.5)
PROT UR QL STRIP: NEGATIVE
RBC # BLD AUTO: 4.21 10*6/MM3 (ref 3.77–5.28)
SODIUM SERPL-SCNC: 141 MMOL/L (ref 136–145)
SP GR UR STRIP: <1.005 (ref 1–1.03)
UROBILINOGEN UR QL STRIP: ABNORMAL
VARIANT LYMPHS NFR BLD MANUAL: 27 % (ref 19.6–45.3)
WBC MORPH BLD: NORMAL
WBC NRBC COR # BLD: 8.65 10*3/MM3 (ref 3.4–10.8)

## 2022-11-04 PROCEDURE — 85027 COMPLETE CBC AUTOMATED: CPT

## 2022-11-04 PROCEDURE — 36415 COLL VENOUS BLD VENIPUNCTURE: CPT

## 2022-11-04 PROCEDURE — 80053 COMPREHEN METABOLIC PANEL: CPT

## 2022-11-04 PROCEDURE — 85007 BL SMEAR W/DIFF WBC COUNT: CPT

## 2022-11-04 PROCEDURE — 81003 URINALYSIS AUTO W/O SCOPE: CPT

## 2022-11-04 PROCEDURE — 82306 VITAMIN D 25 HYDROXY: CPT

## 2022-12-05 ENCOUNTER — APPOINTMENT (OUTPATIENT)
Dept: CT IMAGING | Age: 42
End: 2022-12-05
Payer: OTHER MISCELLANEOUS

## 2022-12-05 ENCOUNTER — HOSPITAL ENCOUNTER (EMERGENCY)
Age: 42
Discharge: HOME | End: 2022-12-05
Attending: EMERGENCY MEDICINE
Payer: OTHER MISCELLANEOUS

## 2022-12-05 ENCOUNTER — APPOINTMENT (OUTPATIENT)
Dept: GENERAL RADIOLOGY | Age: 42
End: 2022-12-05
Payer: OTHER MISCELLANEOUS

## 2022-12-05 VITALS
RESPIRATION RATE: 18 BRPM | SYSTOLIC BLOOD PRESSURE: 138 MMHG | DIASTOLIC BLOOD PRESSURE: 86 MMHG | OXYGEN SATURATION: 99 % | HEIGHT: 67 IN | WEIGHT: 195 LBS | TEMPERATURE: 98.2 F | BODY MASS INDEX: 30.61 KG/M2 | HEART RATE: 98 BPM

## 2022-12-05 DIAGNOSIS — S09.90XA CLOSED HEAD INJURY, INITIAL ENCOUNTER: ICD-10-CM

## 2022-12-05 DIAGNOSIS — S50.00XA CONTUSION OF ELBOW, UNSPECIFIED LATERALITY, INITIAL ENCOUNTER: ICD-10-CM
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
## 2022-12-05 DIAGNOSIS — S16.1XXA ACUTE STRAIN OF NECK MUSCLE, INITIAL ENCOUNTER: ICD-10-CM

## 2022-12-05 DIAGNOSIS — S80.12XA CONTUSION OF LEFT LOWER LEG, INITIAL ENCOUNTER: ICD-10-CM

## 2022-12-05 PROCEDURE — 73080 X-RAY EXAM OF ELBOW: CPT

## 2022-12-05 PROCEDURE — 73590 X-RAY EXAM OF LOWER LEG: CPT

## 2022-12-05 PROCEDURE — 70450 CT HEAD/BRAIN W/O DYE: CPT

## 2022-12-05 PROCEDURE — 73080 X-RAY EXAM OF ELBOW: CPT | Performed by: RADIOLOGY

## 2022-12-05 PROCEDURE — 71045 X-RAY EXAM CHEST 1 VIEW: CPT | Performed by: RADIOLOGY

## 2022-12-05 PROCEDURE — 72125 CT NECK SPINE W/O DYE: CPT

## 2022-12-05 PROCEDURE — 99284 EMERGENCY DEPT VISIT MOD MDM: CPT

## 2022-12-05 PROCEDURE — 73590 X-RAY EXAM OF LOWER LEG: CPT | Performed by: RADIOLOGY

## 2022-12-05 PROCEDURE — 2500000003 HC RX 250 WO HCPCS: Performed by: EMERGENCY MEDICINE

## 2022-12-05 PROCEDURE — 71045 X-RAY EXAM CHEST 1 VIEW: CPT

## 2022-12-05 RX ORDER — LIDOCAINE HYDROCHLORIDE 10 MG/ML
5 INJECTION, SOLUTION EPIDURAL; INFILTRATION; INTRACAUDAL; PERINEURAL ONCE
Status: COMPLETED | OUTPATIENT
Start: 2022-12-05 | End: 2022-12-05

## 2022-12-05 RX ADMIN — LIDOCAINE HYDROCHLORIDE 5 ML: 10 INJECTION, SOLUTION EPIDURAL; INFILTRATION; INTRACAUDAL; PERINEURAL at 21:59

## 2022-12-05 ASSESSMENT — PAIN SCALES - GENERAL: PAINLEVEL_OUTOF10: 7

## 2022-12-05 ASSESSMENT — PAIN DESCRIPTION - LOCATION: LOCATION: ARM;HEAD;LEG

## 2022-12-05 ASSESSMENT — PAIN DESCRIPTION - ORIENTATION: ORIENTATION: RIGHT;LEFT

## 2022-12-05 ASSESSMENT — PAIN - FUNCTIONAL ASSESSMENT: PAIN_FUNCTIONAL_ASSESSMENT: 0-10

## 2022-12-05 ASSESSMENT — LIFESTYLE VARIABLES: HOW OFTEN DO YOU HAVE A DRINK CONTAINING ALCOHOL: NEVER

## 2022-12-05 ASSESSMENT — PAIN DESCRIPTION - DESCRIPTORS: DESCRIPTORS: ACHING

## 2022-12-06 ASSESSMENT — ENCOUNTER SYMPTOMS
ABDOMINAL PAIN: 0
BACK PAIN: 0
VOMITING: 0
COUGH: 0
RHINORRHEA: 0
NAUSEA: 0
DIARRHEA: 0
SHORTNESS OF BREATH: 0
SORE THROAT: 0

## 2023-01-16 ENCOUNTER — HOSPITAL ENCOUNTER (OUTPATIENT)
Dept: WOMENS IMAGING | Age: 43
Discharge: HOME | End: 2023-01-16

## 2023-01-16 DIAGNOSIS — Z12.31 ENCOUNTER FOR SCREENING MAMMOGRAM FOR MALIGNANT NEOPLASM OF BREAST: ICD-10-CM

## 2023-02-06 ENCOUNTER — HOSPITAL ENCOUNTER (OUTPATIENT)
Dept: INFUSION THERAPY | Age: 43
Discharge: HOME | End: 2023-02-06

## 2023-02-06 NOTE — CONSULTS
Completed a post-test results disclosure discussion with patient. The patient's testing was Negative for a clinically actionable gene mutation. If riskScore was performed for the patient, it was reviewed in detail. We also reviewed family members that may benefit from testing. I directed the patient to follow up with their healthcare provider, for next steps regarding their healthcare.     TC 9.2%

## 2023-02-20 LAB
ALBUMIN SERPL-MCNC: 4.2 G/DL (ref 3.5–5.2)
ALP BLD-CCNC: 78 U/L (ref 35–104)
ALT SERPL-CCNC: 16 U/L (ref 5–33)
ANION GAP SERPL CALCULATED.3IONS-SCNC: 15 MMOL/L (ref 7–19)
AST SERPL-CCNC: 22 U/L (ref 5–32)
BILIRUB SERPL-MCNC: 0.5 MG/DL (ref 0.2–1.2)
BUN BLDV-MCNC: 7 MG/DL (ref 6–20)
CALCIUM SERPL-MCNC: 9.4 MG/DL (ref 8.6–10)
CHLORIDE BLD-SCNC: 103 MMOL/L (ref 98–111)
CHOLESTEROL, TOTAL: 152 MG/DL (ref 160–199)
CO2: 23 MMOL/L (ref 22–29)
CREAT SERPL-MCNC: 0.8 MG/DL (ref 0.5–0.9)
GFR SERPL CREATININE-BSD FRML MDRD: >60 ML/MIN/{1.73_M2}
GLUCOSE BLD-MCNC: 90 MG/DL (ref 74–109)
HDLC SERPL-MCNC: 66 MG/DL (ref 65–121)
LDL CHOLESTEROL CALCULATED: 68 MG/DL
POTASSIUM SERPL-SCNC: 4 MMOL/L (ref 3.5–5)
SODIUM BLD-SCNC: 141 MMOL/L (ref 136–145)
TOTAL PROTEIN: 7 G/DL (ref 6.6–8.7)
TRIGL SERPL-MCNC: 89 MG/DL (ref 0–149)
TSH SERPL DL<=0.05 MIU/L-ACNC: 1.42 UIU/ML (ref 0.27–4.2)

## 2023-02-21 LAB
BASOPHILS ABSOLUTE: 0 K/UL (ref 0–0.2)
BASOPHILS RELATIVE PERCENT: 0.4 % (ref 0–1)
EOSINOPHILS ABSOLUTE: 0 K/UL (ref 0–0.6)
EOSINOPHILS RELATIVE PERCENT: 0.1 % (ref 0–5)
HCT VFR BLD CALC: 41.9 % (ref 37–47)
HEMOGLOBIN: 13.5 G/DL (ref 12–16)
IMMATURE GRANULOCYTES #: 0 K/UL
LYMPHOCYTES ABSOLUTE: 2.7 K/UL (ref 1.1–4.5)
LYMPHOCYTES RELATIVE PERCENT: 26.2 % (ref 20–40)
MCH RBC QN AUTO: 30.2 PG (ref 27–31)
MCHC RBC AUTO-ENTMCNC: 32.2 G/DL (ref 33–37)
MCV RBC AUTO: 93.7 FL (ref 81–99)
MONOCYTES ABSOLUTE: 0.6 K/UL (ref 0–0.9)
MONOCYTES RELATIVE PERCENT: 5.4 % (ref 0–10)
NEUTROPHILS ABSOLUTE: 7 K/UL (ref 1.5–7.5)
NEUTROPHILS RELATIVE PERCENT: 67.7 % (ref 50–65)
PDW BLD-RTO: 13.2 % (ref 11.5–14.5)
PLATELET # BLD: 331 K/UL (ref 130–400)
PMV BLD AUTO: 10.4 FL (ref 9.4–12.3)
RBC # BLD: 4.47 M/UL (ref 4.2–5.4)
WBC # BLD: 10.4 K/UL (ref 4.8–10.8)

## 2023-04-25 ENCOUNTER — TRANSCRIBE ORDERS (OUTPATIENT)
Dept: ADMINISTRATIVE | Facility: HOSPITAL | Age: 43
End: 2023-04-25
Payer: COMMERCIAL

## 2023-04-25 DIAGNOSIS — R00.2 PALPITATIONS: Primary | ICD-10-CM

## 2023-05-01 ENCOUNTER — HOSPITAL ENCOUNTER (OUTPATIENT)
Dept: CARDIOLOGY | Facility: HOSPITAL | Age: 43
Discharge: HOME | End: 2023-05-01
Admitting: FAMILY MEDICINE
Payer: COMMERCIAL

## 2023-05-01 LAB
MAXIMAL PREDICTED HEART RATE: 176 BPM
STRESS TARGET HR: 150 BPM

## 2023-05-01 PROCEDURE — 93246 EXT ECG>7D<15D RECORDING: CPT

## 2023-05-13 LAB
MAXIMAL PREDICTED HEART RATE: 176 BPM
STRESS TARGET HR: 150 BPM

## 2023-05-18 ENCOUNTER — TRANSCRIBE ORDERS (OUTPATIENT)
Dept: ADMINISTRATIVE | Facility: HOSPITAL | Age: 43
End: 2023-05-18
Payer: COMMERCIAL

## 2023-05-18 DIAGNOSIS — R07.9 CHEST PAIN, UNSPECIFIED TYPE: Primary | ICD-10-CM

## 2023-05-23 ENCOUNTER — HOSPITAL ENCOUNTER (OUTPATIENT)
Dept: CARDIOLOGY | Facility: HOSPITAL | Age: 43
Discharge: HOME | End: 2023-05-23
Admitting: FAMILY MEDICINE
Payer: COMMERCIAL

## 2023-05-23 VITALS
HEART RATE: 64 BPM | WEIGHT: 180 LBS | SYSTOLIC BLOOD PRESSURE: 133 MMHG | DIASTOLIC BLOOD PRESSURE: 78 MMHG | HEIGHT: 67 IN | BODY MASS INDEX: 28.25 KG/M2

## 2023-05-23 DIAGNOSIS — R07.9 CHEST PAIN, UNSPECIFIED TYPE: ICD-10-CM

## 2023-05-23 LAB
BH CV STRESS BP STAGE 1: NORMAL
BH CV STRESS BP STAGE 2: NORMAL
BH CV STRESS BP STAGE 3: NORMAL
BH CV STRESS DURATION MIN STAGE 1: 3
BH CV STRESS DURATION MIN STAGE 2: 3
BH CV STRESS DURATION MIN STAGE 3: 1
BH CV STRESS DURATION SEC STAGE 1: 0
BH CV STRESS DURATION SEC STAGE 2: 0
BH CV STRESS DURATION SEC STAGE 3: 45
BH CV STRESS GRADE STAGE 1: 10
BH CV STRESS GRADE STAGE 2: 12
BH CV STRESS GRADE STAGE 3: 14
BH CV STRESS HR STAGE 1: 112
BH CV STRESS HR STAGE 2: 139
BH CV STRESS HR STAGE 3: 160
BH CV STRESS METS STAGE 1: 5
BH CV STRESS METS STAGE 2: 7.5
BH CV STRESS METS STAGE 3: 10
BH CV STRESS PROTOCOL 1: NORMAL
BH CV STRESS RECOVERY BP: NORMAL MMHG
BH CV STRESS RECOVERY HR: 96 BPM
BH CV STRESS SPEED STAGE 1: 1.7
BH CV STRESS SPEED STAGE 2: 2.5
BH CV STRESS SPEED STAGE 3: 3.4
BH CV STRESS STAGE 1: 1
BH CV STRESS STAGE 2: 2
BH CV STRESS STAGE 3: 3
MAXIMAL PREDICTED HEART RATE: 176 BPM
PERCENT MAX PREDICTED HR: 90.91 %
STRESS BASELINE BP: NORMAL MMHG
STRESS BASELINE HR: 73 BPM
STRESS PERCENT HR: 107 %
STRESS POST ESTIMATED WORKLOAD: 10 METS
STRESS POST EXERCISE DUR MIN: 7 MIN
STRESS POST EXERCISE DUR SEC: 45 SEC
STRESS POST PEAK BP: NORMAL MMHG
STRESS POST PEAK HR: 160 BPM
STRESS TARGET HR: 150 BPM

## 2023-05-23 PROCEDURE — 93017 CV STRESS TEST TRACING ONLY: CPT

## 2023-05-23 PROCEDURE — 25510000001 PERFLUTREN 6.52 MG/ML SUSPENSION: Performed by: INTERNAL MEDICINE

## 2023-05-23 PROCEDURE — 93350 STRESS TTE ONLY: CPT

## 2023-05-23 RX ADMIN — PERFLUTREN 8.48 MG: 6.52 INJECTION, SUSPENSION INTRAVENOUS at 15:36

## 2023-06-09 ENCOUNTER — TRANSCRIBE ORDERS (OUTPATIENT)
Dept: ADMINISTRATIVE | Age: 43
End: 2023-06-09

## 2023-06-09 ENCOUNTER — TRANSCRIBE ORDERS (OUTPATIENT)
Dept: ADMINISTRATIVE | Facility: HOSPITAL | Age: 43
End: 2023-06-09
Payer: COMMERCIAL

## 2023-06-09 DIAGNOSIS — R10.9 ABDOMINAL CRAMPS: Primary | ICD-10-CM

## 2023-06-09 DIAGNOSIS — R10.9 CRAMP, ABDOMINAL: Primary | ICD-10-CM

## 2023-06-15 ENCOUNTER — HOSPITAL ENCOUNTER (OUTPATIENT)
Dept: ULTRASOUND IMAGING | Facility: HOSPITAL | Age: 43
Discharge: HOME | End: 2023-06-15
Admitting: NURSE PRACTITIONER
Payer: COMMERCIAL

## 2023-06-15 DIAGNOSIS — R10.9 ABDOMINAL CRAMPS: ICD-10-CM

## 2023-06-15 PROCEDURE — 76705 ECHO EXAM OF ABDOMEN: CPT

## 2023-08-14 LAB
ALBUMIN SERPL-MCNC: 4.8 G/DL (ref 3.5–5.2)
ALP SERPL-CCNC: 74 U/L (ref 35–104)
ALT SERPL-CCNC: 16 U/L (ref 5–33)
ANION GAP SERPL CALCULATED.3IONS-SCNC: 9 MMOL/L (ref 7–19)
AST SERPL-CCNC: 14 U/L (ref 5–32)
BILIRUB SERPL-MCNC: 0.4 MG/DL (ref 0.2–1.2)
BUN SERPL-MCNC: 8 MG/DL (ref 6–20)
CALCIUM SERPL-MCNC: 10 MG/DL (ref 8.6–10)
CHLORIDE SERPL-SCNC: 105 MMOL/L (ref 98–111)
CO2 SERPL-SCNC: 26 MMOL/L (ref 22–29)
CREAT SERPL-MCNC: 0.9 MG/DL (ref 0.5–0.9)
GLUCOSE SERPL-MCNC: 96 MG/DL (ref 74–109)
POTASSIUM SERPL-SCNC: 4.1 MMOL/L (ref 3.5–5)
PROT SERPL-MCNC: 7.5 G/DL (ref 6.6–8.7)
SODIUM SERPL-SCNC: 140 MMOL/L (ref 136–145)

## 2023-09-14 ASSESSMENT — PATIENT HEALTH QUESTIONNAIRE - PHQ9
SUM OF ALL RESPONSES TO PHQ QUESTIONS 1-9: 2
SUM OF ALL RESPONSES TO PHQ9 QUESTIONS 1 & 2: 2
2. FEELING DOWN, DEPRESSED OR HOPELESS: 1
SUM OF ALL RESPONSES TO PHQ9 QUESTIONS 1 & 2: 2
SUM OF ALL RESPONSES TO PHQ QUESTIONS 1-9: 2
1. LITTLE INTEREST OR PLEASURE IN DOING THINGS: 1
1. LITTLE INTEREST OR PLEASURE IN DOING THINGS: SEVERAL DAYS
2. FEELING DOWN, DEPRESSED OR HOPELESS: SEVERAL DAYS

## 2023-09-18 ENCOUNTER — OFFICE VISIT (OUTPATIENT)
Dept: OBGYN CLINIC | Age: 43
End: 2023-09-18
Payer: COMMERCIAL

## 2023-09-18 VITALS
WEIGHT: 164 LBS | HEART RATE: 71 BPM | BODY MASS INDEX: 25.69 KG/M2 | SYSTOLIC BLOOD PRESSURE: 123 MMHG | DIASTOLIC BLOOD PRESSURE: 75 MMHG

## 2023-09-18 DIAGNOSIS — Z01.419 WELL WOMAN EXAM WITH ROUTINE GYNECOLOGICAL EXAM: Primary | ICD-10-CM

## 2023-09-18 DIAGNOSIS — Z82.62 FAMILY HISTORY OF OSTEOPOROSIS: ICD-10-CM

## 2023-09-18 DIAGNOSIS — Z12.4 SCREENING FOR CERVICAL CANCER: ICD-10-CM

## 2023-09-18 DIAGNOSIS — L63.9 ALOPECIA AREATA: ICD-10-CM

## 2023-09-18 DIAGNOSIS — Z98.890 HISTORY OF LOOP ELECTROSURGICAL EXCISION PROCEDURE (LEEP): ICD-10-CM

## 2023-09-18 DIAGNOSIS — M15.9 OSTEOARTHRITIS OF MULTIPLE JOINTS, UNSPECIFIED OSTEOARTHRITIS TYPE: ICD-10-CM

## 2023-09-18 DIAGNOSIS — Z12.31 ENCOUNTER FOR SCREENING MAMMOGRAM FOR MALIGNANT NEOPLASM OF BREAST: ICD-10-CM

## 2023-09-18 DIAGNOSIS — Z12.39 ENCOUNTER FOR SCREENING BREAST EXAMINATION: ICD-10-CM

## 2023-09-18 PROCEDURE — 99213 OFFICE O/P EST LOW 20 MIN: CPT | Performed by: NURSE PRACTITIONER

## 2023-09-18 PROCEDURE — 99396 PREV VISIT EST AGE 40-64: CPT | Performed by: NURSE PRACTITIONER

## 2023-09-18 RX ORDER — LAMOTRIGINE 25 MG/1
25 TABLET ORAL DAILY
COMMUNITY
Start: 2023-01-16

## 2023-09-18 RX ORDER — BUPROPION HYDROCHLORIDE 150 MG/1
150 TABLET ORAL DAILY
COMMUNITY
Start: 2023-07-09

## 2023-09-18 ASSESSMENT — ENCOUNTER SYMPTOMS
EYES NEGATIVE: 1
ALLERGIC/IMMUNOLOGIC NEGATIVE: 1
RESPIRATORY NEGATIVE: 1
GASTROINTESTINAL NEGATIVE: 1

## 2023-09-21 LAB
HPV HR 12 DNA SPEC QL NAA+PROBE: NOT DETECTED
HPV16 DNA SPEC QL NAA+PROBE: NOT DETECTED
HPV16+18+H RISK 12 DNA SPEC-IMP: NORMAL
HPV18 DNA SPEC QL NAA+PROBE: NOT DETECTED

## 2023-09-27 ENCOUNTER — OFFICE VISIT (OUTPATIENT)
Dept: GASTROENTEROLOGY | Facility: CLINIC | Age: 43
End: 2023-09-27
Payer: COMMERCIAL

## 2023-09-27 VITALS
WEIGHT: 174.2 LBS | HEART RATE: 65 BPM | SYSTOLIC BLOOD PRESSURE: 118 MMHG | DIASTOLIC BLOOD PRESSURE: 78 MMHG | BODY MASS INDEX: 27.34 KG/M2 | OXYGEN SATURATION: 98 % | TEMPERATURE: 96.9 F | HEIGHT: 67 IN

## 2023-09-27 DIAGNOSIS — Z78.9 NONSMOKER: ICD-10-CM

## 2023-09-27 DIAGNOSIS — K76.9 LIVER LESION: Primary | ICD-10-CM

## 2023-09-27 DIAGNOSIS — R93.2 ABNORMAL ULTRASOUND OF LIVER: ICD-10-CM

## 2023-09-27 DIAGNOSIS — R10.13 EPIGASTRIC PAIN: ICD-10-CM

## 2023-09-27 DIAGNOSIS — R11.2 NAUSEA AND VOMITING, UNSPECIFIED VOMITING TYPE: ICD-10-CM

## 2023-09-27 RX ORDER — BUPROPION HYDROCHLORIDE 150 MG/1
1 TABLET ORAL DAILY
COMMUNITY
Start: 2023-07-09

## 2023-09-27 RX ORDER — PANTOPRAZOLE SODIUM 40 MG/1
40 TABLET, DELAYED RELEASE ORAL DAILY
Qty: 30 TABLET | Refills: 11 | Status: SHIPPED | OUTPATIENT
Start: 2023-09-27

## 2023-09-27 RX ORDER — FLUTICASONE PROPIONATE 50 MCG
1 SPRAY, SUSPENSION (ML) NASAL DAILY
COMMUNITY
Start: 2023-07-31

## 2023-09-27 RX ORDER — LORATADINE 10 MG/1
10 TABLET ORAL DAILY
COMMUNITY

## 2023-09-27 NOTE — PROGRESS NOTES
Zena Zheng  1980 9/27/2023  Chief Complaint   Patient presents with    GI Problem     Abd pain,liver cyst     Subjective   HPI  Zena Zheng is a 43 y.o. female who presents with a complaint of incidental finding on ultrasound of the liver and epigastric pain. Workup with her PCP included gallbladder workup. Liver enzymes are normal on 8/14/23.   IMPRESSION:  1. A 1 cm circumscribed echogenic area adjacent to the gallbladder fossa  appears to be within the liver parenchyma. This could be a small  hemangioma. This may also represent volume averaging of fat adjacent to  the gallbladder and liver.  2. There is a small gallbladder polyp. There is some floating debris  within the gallbladder. There is no ductal dilatation. No gallstones are  appreciated.  3. Echogenic pancreas can be a normal variant. It can also be seen in  patients with diabetes and prediabetes.  This report was finalized on 06/15/2023 11:31 by Dr. Montez Zheng MD.    HIDA scan  IMPRESSION:  1. Normal gallbladder ejection fraction of 47 percent.  2. LEFT liver lobe appears irregular at the superior aspect. This could  be artifactual, but other etiologies such as liver lesion not excluded.  Recommend CT abdomen with contrast for further evaluation.  This report was finalized on 07/05/2023 14:51 by Dr Shannan Lentz MD.    She has had weight loss of approx 20 lbs she says intentional and it has helped her blood pressure  Her stomach burns throughout with nausea no vomiting. She rates her stomach/epigastric pain 8 out of 10 at times. It is typically a 4 out of 10. It is daily. Eating does make it worse. No melena. No BRBPR.She has some dysphagia upper esophageal region. It is worse with solid foods. Moderate for her. Time makes it better. She does not take anything for acid reflux. She does not use ETOH. No hx for IV drugs.     Past Medical History:   Diagnosis Date    Anxiety     BV (bacterial vaginosis)     Depression      Past  Surgical History:   Procedure Laterality Date    ANKLE SURGERY      TUBAL ABDOMINAL LIGATION         Outpatient Medications Marked as Taking for the 9/27/23 encounter (Office Visit) with Alicia Rankin APRN   Medication Sig Dispense Refill    buPROPion XL (WELLBUTRIN XL) 150 MG 24 hr tablet Take 1 tablet by mouth Daily.      fluticasone (FLONASE) 50 MCG/ACT nasal spray 1 spray by Each Nare route Daily.      hydrOXYzine pamoate (VISTARIL) 25 MG capsule Take 1 capsule by mouth every night at bedtime.      lamoTRIgine (LaMICtal) 25 MG tablet Take 1 tablet by mouth Daily.      loratadine (Claritin) 10 MG tablet Take 1 tablet by mouth Daily.      tiZANidine (ZANAFLEX) 4 MG tablet Take 1 tablet by mouth 3 (Three) Times a Day.      triamcinolone (KENALOG) 0.1 % ointment Apply 1 application topically to the appropriate area as directed 2 (Two) Times a Day. 15 g 0     Allergies   Allergen Reactions    Doxycycline Hives and Itching    Erythromycin Hives and Itching    Sulfa Antibiotics Hives and Itching    Tape Rash     Social History     Socioeconomic History    Marital status:    Tobacco Use    Smoking status: Never    Smokeless tobacco: Never   Vaping Use    Vaping Use: Never used   Substance and Sexual Activity    Alcohol use: No    Drug use: Never    Sexual activity: Yes     Partners: Male     Birth control/protection: Surgical     Family History   Problem Relation Age of Onset    Colon polyps Mother     Arthritis Mother     Heart disease Mother     Stroke Father     No Known Problems Sister     No Known Problems Brother     No Known Problems Maternal Aunt     No Known Problems Paternal Aunt     No Known Problems Maternal Grandmother     No Known Problems Paternal Grandmother     No Known Problems Daughter     No Known Problems Son     BRCA 1/2 Neg Hx     Breast cancer Neg Hx     Colon cancer Neg Hx     Endometrial cancer Neg Hx     Ovarian cancer Neg Hx      Health Maintenance   Topic Date Due    BMI  "FOLLOWUP  Never done    COVID-19 Vaccine (1) Never done    HEPATITIS C SCREENING  Never done    ANNUAL PHYSICAL  Never done    INFLUENZA VACCINE  10/01/2023    PAP SMEAR  09/18/2026    TDAP/TD VACCINES (3 - Td or Tdap) 12/18/2028    Pneumococcal Vaccine 0-64  Aged Out     Review of Systems   Constitutional:  Negative for activity change, appetite change, chills, diaphoresis, fatigue, fever and unexpected weight change.   HENT:  Positive for trouble swallowing. Negative for ear pain, hearing loss, mouth sores, sore throat and voice change.    Eyes: Negative.    Respiratory:  Negative for cough, choking, shortness of breath and wheezing.    Cardiovascular:  Negative for chest pain and palpitations.   Gastrointestinal:  Positive for abdominal pain and nausea. Negative for blood in stool, constipation, diarrhea and vomiting.   Endocrine: Negative for cold intolerance and heat intolerance.   Genitourinary:  Negative for decreased urine volume, dysuria, frequency, hematuria and urgency.   Musculoskeletal:  Negative for back pain, gait problem and myalgias.   Skin:  Negative for color change, pallor and rash.   Allergic/Immunologic: Negative for food allergies and immunocompromised state.   Neurological:  Negative for dizziness, tremors, seizures, syncope, weakness, light-headedness, numbness and headaches.   Hematological:  Negative for adenopathy. Does not bruise/bleed easily.   Psychiatric/Behavioral:  Negative for agitation and confusion. The patient is not nervous/anxious.    All other systems reviewed and are negative.  Objective   Vitals:    09/27/23 0928   BP: 118/78   BP Location: Left arm   Pulse: 65   Temp: 96.9 °F (36.1 °C)   TempSrc: Temporal   SpO2: 98%   Weight: 79 kg (174 lb 3.2 oz)   Height: 170.2 cm (67.01\")     Body mass index is 27.28 kg/m².  Physical Exam  Constitutional:       Appearance: She is well-developed.   HENT:      Head: Normocephalic and atraumatic.   Eyes:      Pupils: Pupils are equal, " round, and reactive to light.   Neck:      Trachea: No tracheal deviation.   Cardiovascular:      Rate and Rhythm: Normal rate and regular rhythm.      Heart sounds: Normal heart sounds. No murmur heard.    No friction rub. No gallop.   Pulmonary:      Effort: Pulmonary effort is normal. No respiratory distress.      Breath sounds: Normal breath sounds. No wheezing or rales.   Chest:      Chest wall: No tenderness.   Abdominal:      General: Bowel sounds are normal. There is no distension.      Palpations: Abdomen is soft. Abdomen is not rigid.      Tenderness: There is no abdominal tenderness. There is no guarding or rebound.   Musculoskeletal:         General: No tenderness or deformity. Normal range of motion.      Cervical back: Normal range of motion and neck supple.   Skin:     General: Skin is warm and dry.      Coloration: Skin is not pale.      Findings: No rash.   Neurological:      Mental Status: She is alert and oriented to person, place, and time.      Deep Tendon Reflexes: Reflexes are normal and symmetric.   Psychiatric:         Behavior: Behavior normal.         Thought Content: Thought content normal.         Judgment: Judgment normal.     Assessment & Plan   Diagnoses and all orders for this visit:    1. Liver lesion (Primary)  -     MRI Abdomen With & Without Contrast  -     AFP Tumor Marker    2. Epigastric pain  -     pantoprazole (PROTONIX) 40 MG EC tablet; Take 1 tablet by mouth Daily.  Dispense: 30 tablet; Refill: 11    3. Abnormal ultrasound of liver    4. Nausea and vomiting, unspecified vomiting type    5. Nonsmoker    I discussed non pharmaceutical treatment of gerd.  This includes gradually losing weight to achieve ideal body wt., elevation of the head of bed by 4-6 inches, nothing to eat or drink 3 hours prior to lying down, avoiding tight clothing, stress reduction, tobacco cessation, reduction of alcohol intake, and dietary restrictions (avoiding caffeine, coffee, fatty foods, mints,  chocolate, spicy foods and tomato based sauces as much as possible).  Will start PPI  Will need Endoscopy however will get MRI first given the liver findings.     * Surgery not found *  Part of this note may be an electronic transcription/translation of spoken language to printed text using the Dragon Dictation System.  Body mass index is 27.28 kg/m².  Return in about 2 weeks (around 10/11/2023).    BMI is >= 25 and <30. (Overweight) The following options were offered after discussion;: weight loss educational material (shared in after visit summary)      All risks, benefits, alternatives, and indications of colonoscopy and/or Endoscopy procedure have been discussed with the patient. Risks to include perforation of the colon requiring possible surgery or colostomy, risk of bleeding from biopsies or removal of colon tissue, possibility of missing a colon polyp or cancer, or adverse drug reaction.  Benefits to include the diagnosis and management of disease of the colon and rectum. Alternatives to include barium enema, radiographic evaluation, lab testing or no intervention. Pt verbalizes understanding and agrees.     Alicia Rankin, APRN  9/27/2023  09:52 CDT          If you smoke or use tobacco, 4 minutes reading provided  Steps to Quit Smoking  Smoking tobacco can be harmful to your health and can affect almost every organ in your body. Smoking puts you, and those around you, at risk for developing many serious chronic diseases. Quitting smoking is difficult, but it is one of the best things that you can do for your health. It is never too late to quit.  What are the benefits of quitting smoking?  When you quit smoking, you lower your risk of developing serious diseases and conditions, such as:  Lung cancer or lung disease, such as COPD.  Heart disease.  Stroke.  Heart attack.  Infertility.  Osteoporosis and bone fractures.  Additionally, symptoms such as coughing, wheezing, and shortness of breath may get  better when you quit. You may also find that you get sick less often because your body is stronger at fighting off colds and infections. If you are pregnant, quitting smoking can help to reduce your chances of having a baby of low birth weight.  How do I get ready to quit?  When you decide to quit smoking, create a plan to make sure that you are successful. Before you quit:  Pick a date to quit. Set a date within the next two weeks to give you time to prepare.  Write down the reasons why you are quitting. Keep this list in places where you will see it often, such as on your bathroom mirror or in your car or wallet.  Identify the people, places, things, and activities that make you want to smoke (triggers) and avoid them. Make sure to take these actions:  Throw away all cigarettes at home, at work, and in your car.  Throw away smoking accessories, such as ashtrays and lighters.  Clean your car and make sure to empty the ashtray.  Clean your home, including curtains and carpets.  Tell your family, friends, and coworkers that you are quitting. Support from your loved ones can make quitting easier.  Talk with your health care provider about your options for quitting smoking.  Find out what treatment options are covered by your health insurance.  What strategies can I use to quit smoking?  Talk with your healthcare provider about different strategies to quit smoking. Some strategies include:  Quitting smoking altogether instead of gradually lessening how much you smoke over a period of time. Research shows that quitting “cold turkey” is more successful than gradually quitting.  Attending in-person counseling to help you build problem-solving skills. You are more likely to have success in quitting if you attend several counseling sessions. Even short sessions of 10 minutes can be effective.  Finding resources and support systems that can help you to quit smoking and remain smoke-free after you quit. These resources are  most helpful when you use them often. They can include:  Online chats with a counselor.  Telephone quitlines.  Printed self-help materials.  Support groups or group counseling.  Text messaging programs.  Mobile phone applications.  Taking medicines to help you quit smoking. (If you are pregnant or breastfeeding, talk with your health care provider first.) Some medicines contain nicotine and some do not. Both types of medicines help with cravings, but the medicines that include nicotine help to relieve withdrawal symptoms. Your health care provider may recommend:  Nicotine patches, gum, or lozenges.  Nicotine inhalers or sprays.  Non-nicotine medicine that is taken by mouth.  Talk with your health care provider about combining strategies, such as taking medicines while you are also receiving in-person counseling. Using these two strategies together makes you more likely to succeed in quitting than if you used either strategy on its own.  If you are pregnant or breastfeeding, talk with your health care provider about finding counseling or other support strategies to quit smoking. Do not take medicine to help you quit smoking unless told to do so by your health care provider.  What things can I do to make it easier to quit?  Quitting smoking might feel overwhelming at first, but there is a lot that you can do to make it easier. Take these important actions:  Reach out to your family and friends and ask that they support and encourage you during this time. Call telephone quitlines, reach out to support groups, or work with a counselor for support.  Ask people who smoke to avoid smoking around you.  Avoid places that trigger you to smoke, such as bars, parties, or smoke-break areas at work.  Spend time around people who do not smoke.  Lessen stress in your life, because stress can be a smoking trigger for some people. To lessen stress, try:  Exercising regularly.  Deep-breathing exercises.  Yoga.  Meditating.  Performing  a body scan. This involves closing your eyes, scanning your body from head to toe, and noticing which parts of your body are particularly tense. Purposefully relax the muscles in those areas.  Download or purchase mobile phone or tablet apps (applications) that can help you stick to your quit plan by providing reminders, tips, and encouragement. There are many free apps, such as QuitGuide from the CDC (Centers for Disease Control and Prevention). You can find other support for quitting smoking (smoking cessation) through smokefree.gov and other websites.  How will I feel when I quit smoking?  Within the first 24 hours of quitting smoking, you may start to feel some withdrawal symptoms. These symptoms are usually most noticeable 2-3 days after quitting, but they usually do not last beyond 2-3 weeks. Changes or symptoms that you might experience include:  Mood swings.  Restlessness, anxiety, or irritation.  Difficulty concentrating.  Dizziness.  Strong cravings for sugary foods in addition to nicotine.  Mild weight gain.  Constipation.  Nausea.  Coughing or a sore throat.  Changes in how your medicines work in your body.  A depressed mood.  Difficulty sleeping (insomnia).  After the first 2-3 weeks of quitting, you may start to notice more positive results, such as:  Improved sense of smell and taste.  Decreased coughing and sore throat.  Slower heart rate.  Lower blood pressure.  Clearer skin.  The ability to breathe more easily.  Fewer sick days.  Quitting smoking is very challenging for most people. Do not get discouraged if you are not successful the first time. Some people need to make many attempts to quit before they achieve long-term success. Do your best to stick to your quit plan, and talk with your health care provider if you have any questions or concerns.  This information is not intended to replace advice given to you by your health care provider. Make sure you discuss any questions you have with your  health care provider.  Document Released: 12/12/2002 Document Revised: 08/15/2017 Document Reviewed: 05/03/2016  [employer] Patient Education © 2017 [employer]

## 2023-10-30 ENCOUNTER — HOSPITAL ENCOUNTER (OUTPATIENT)
Dept: MRI IMAGING | Facility: HOSPITAL | Age: 43
Discharge: HOME | End: 2023-10-30
Admitting: CLINICAL NURSE SPECIALIST
Payer: COMMERCIAL

## 2023-10-30 LAB — CREAT BLDA-MCNC: 1 MG/DL (ref 0.6–1.3)

## 2023-10-30 PROCEDURE — 82565 ASSAY OF CREATININE: CPT

## 2023-10-30 PROCEDURE — A9581 GADOXETATE DISODIUM INJ: HCPCS | Performed by: CLINICAL NURSE SPECIALIST

## 2023-10-30 PROCEDURE — 25510000001 GADOXETATE 0.25 MOL/L SOLUTION: Performed by: CLINICAL NURSE SPECIALIST

## 2023-10-30 PROCEDURE — 74183 MRI ABD W/O CNTR FLWD CNTR: CPT

## 2023-10-30 RX ADMIN — GADOXETATE DISODIUM 7.5 ML: 181.43 INJECTION, SOLUTION INTRAVENOUS at 10:47

## 2023-11-06 ENCOUNTER — OFFICE VISIT (OUTPATIENT)
Dept: GASTROENTEROLOGY | Facility: CLINIC | Age: 43
End: 2023-11-06
Payer: COMMERCIAL

## 2023-11-06 VITALS
SYSTOLIC BLOOD PRESSURE: 145 MMHG | TEMPERATURE: 95.5 F | OXYGEN SATURATION: 98 % | WEIGHT: 174.8 LBS | DIASTOLIC BLOOD PRESSURE: 80 MMHG | HEART RATE: 75 BPM | BODY MASS INDEX: 27.44 KG/M2 | HEIGHT: 67 IN

## 2023-11-06 DIAGNOSIS — K82.4 GALLBLADDER POLYP: ICD-10-CM

## 2023-11-06 DIAGNOSIS — R11.2 NAUSEA AND VOMITING, UNSPECIFIED VOMITING TYPE: ICD-10-CM

## 2023-11-06 DIAGNOSIS — Z78.9 NONSMOKER: ICD-10-CM

## 2023-11-06 DIAGNOSIS — R10.13 EPIGASTRIC PAIN: Primary | ICD-10-CM

## 2023-11-06 DIAGNOSIS — K76.9 LIVER LESION: ICD-10-CM

## 2023-11-06 DIAGNOSIS — R13.19 ESOPHAGEAL DYSPHAGIA: ICD-10-CM

## 2023-11-06 PROCEDURE — 99214 OFFICE O/P EST MOD 30 MIN: CPT | Performed by: CLINICAL NURSE SPECIALIST

## 2023-11-06 RX ORDER — PREDNISONE 10 MG/1
10 TABLET ORAL DAILY
COMMUNITY
Start: 2023-10-03

## 2023-11-06 RX ORDER — LAMOTRIGINE 25(21)-50
50 KIT ORAL DAILY
COMMUNITY

## 2023-11-06 RX ORDER — METOPROLOL SUCCINATE 25 MG/1
25 TABLET, EXTENDED RELEASE ORAL DAILY
COMMUNITY
Start: 2023-10-23

## 2023-11-06 RX ORDER — HYDROXYZINE HYDROCHLORIDE 25 MG/1
25 TABLET, FILM COATED ORAL DAILY
COMMUNITY

## 2023-11-06 NOTE — H&P (VIEW-ONLY)
Zena Zheng  1980 11/6/2023  Chief Complaint   Patient presents with    GI Problem     2 week fu liver lesion     Subjective   HPI  Zena Zheng is a 43 y.o. female who presents with a complaint of epigastric pain. I ordered MRI 1st due to a liver lesion noted on CT. MRI did not show this area.  Her pain continues. She also has dysphagia upper esophageal region. Food wont go down this is moderate for her occurring daily.    She has had weight loss of approx 20 lbs she says intentional and it has helped her blood pressure  Her stomach burns throughout with nausea no vomiting. She rates her stomach/epigastric pain 8 out of 10 at times. It is typically a 4 out of 10. It is daily. Eating does make it worse. No melena. No BRBPR.She has some dysphagia upper esophageal region. It is worse with solid foods. Moderate for her. Time makes it better. She does not take anything for acid reflux. She does not use ETOH. No hx for IV drugs.   Past Medical History:   Diagnosis Date    Anxiety     BV (bacterial vaginosis)     Depression      Past Surgical History:   Procedure Laterality Date    ANKLE SURGERY      TUBAL ABDOMINAL LIGATION         Outpatient Medications Marked as Taking for the 11/6/23 encounter (Office Visit) with Alicia Rankin APRN   Medication Sig Dispense Refill    buPROPion XL (WELLBUTRIN XL) 150 MG 24 hr tablet Take 1 tablet by mouth Daily.      fluticasone (FLONASE) 50 MCG/ACT nasal spray 1 spray by Each Nare route Daily.      hydrOXYzine (ATARAX) 25 MG tablet Take 1 tablet by mouth Daily.      lamoTRIgine 21 x 25 MG & 7 x 50 MG kit Take 50 mg by mouth Daily.      loratadine (Claritin) 10 MG tablet Take 1 tablet by mouth Daily.      metoprolol succinate XL (TOPROL-XL) 25 MG 24 hr tablet Take 1 tablet by mouth Daily.      pantoprazole (PROTONIX) 40 MG EC tablet Take 1 tablet by mouth Daily. 30 tablet 11    predniSONE (DELTASONE) 10 MG tablet Take 1 tablet by mouth Daily.      tiZANidine  (ZANAFLEX) 4 MG tablet Take 1 tablet by mouth 3 (Three) Times a Day.      triamcinolone (KENALOG) 0.1 % ointment Apply 1 application topically to the appropriate area as directed 2 (Two) Times a Day. 15 g 0    [DISCONTINUED] hydrOXYzine pamoate (VISTARIL) 25 MG capsule Take 1 capsule by mouth every night at bedtime.      [DISCONTINUED] lamoTRIgine (LaMICtal) 25 MG tablet Take 1 tablet by mouth Daily.       Allergies   Allergen Reactions    Doxycycline Hives and Itching    Erythromycin Hives and Itching    Sulfa Antibiotics Hives and Itching    Tape Rash     Social History     Socioeconomic History    Marital status:    Tobacco Use    Smoking status: Never    Smokeless tobacco: Never   Vaping Use    Vaping Use: Never used   Substance and Sexual Activity    Alcohol use: No    Drug use: Never    Sexual activity: Yes     Partners: Male     Birth control/protection: Surgical     Family History   Problem Relation Age of Onset    Colon polyps Mother     Arthritis Mother     Heart disease Mother     Stroke Father     No Known Problems Sister     No Known Problems Brother     No Known Problems Maternal Aunt     No Known Problems Paternal Aunt     No Known Problems Maternal Grandmother     No Known Problems Paternal Grandmother     No Known Problems Daughter     No Known Problems Son     BRCA 1/2 Neg Hx     Breast cancer Neg Hx     Colon cancer Neg Hx     Endometrial cancer Neg Hx     Ovarian cancer Neg Hx      Health Maintenance   Topic Date Due    COVID-19 Vaccine (1) Never done    HEPATITIS C SCREENING  Never done    ANNUAL PHYSICAL  Never done    INFLUENZA VACCINE  08/01/2023    BMI FOLLOWUP  09/27/2024    PAP SMEAR  09/18/2026    TDAP/TD VACCINES (3 - Td or Tdap) 12/18/2028    Pneumococcal Vaccine 0-64  Aged Out     Review of Systems   Constitutional:  Negative for activity change, appetite change, chills, diaphoresis, fatigue, fever and unexpected weight change.   HENT:  Negative for ear pain, hearing loss,  "mouth sores, sore throat, trouble swallowing and voice change.    Eyes: Negative.    Respiratory:  Negative for cough, choking, shortness of breath and wheezing.    Cardiovascular:  Negative for chest pain and palpitations.   Gastrointestinal:  Negative for abdominal pain, blood in stool, constipation, diarrhea, nausea and vomiting.   Endocrine: Negative for cold intolerance and heat intolerance.   Genitourinary:  Negative for decreased urine volume, dysuria, frequency, hematuria and urgency.   Musculoskeletal:  Negative for back pain, gait problem and myalgias.   Skin:  Negative for color change, pallor and rash.   Allergic/Immunologic: Negative for food allergies and immunocompromised state.   Neurological:  Negative for dizziness, tremors, seizures, syncope, weakness, light-headedness, numbness and headaches.   Hematological:  Negative for adenopathy. Does not bruise/bleed easily.   Psychiatric/Behavioral:  Negative for agitation and confusion. The patient is not nervous/anxious.    All other systems reviewed and are negative.    Objective   Vitals:    11/06/23 0950   BP: 145/80   BP Location: Left arm   Pulse: 75   Temp: 95.5 °F (35.3 °C)   TempSrc: Temporal   SpO2: 98%   Weight: 79.3 kg (174 lb 12.8 oz)   Height: 170.2 cm (67.01\")     Body mass index is 27.37 kg/m².  Physical Exam  Constitutional:       Appearance: She is well-developed.   HENT:      Head: Normocephalic and atraumatic.   Eyes:      Pupils: Pupils are equal, round, and reactive to light.   Neck:      Trachea: No tracheal deviation.   Cardiovascular:      Rate and Rhythm: Normal rate and regular rhythm.      Heart sounds: Normal heart sounds. No murmur heard.     No friction rub. No gallop.   Pulmonary:      Effort: Pulmonary effort is normal. No respiratory distress.      Breath sounds: Normal breath sounds. No wheezing or rales.   Chest:      Chest wall: No tenderness.   Abdominal:      General: Bowel sounds are normal. There is no distension. "      Palpations: Abdomen is soft. Abdomen is not rigid.      Tenderness: There is no abdominal tenderness. There is no guarding or rebound.   Musculoskeletal:         General: No tenderness or deformity. Normal range of motion.      Cervical back: Normal range of motion and neck supple.   Skin:     General: Skin is warm and dry.      Coloration: Skin is not pale.      Findings: No rash.   Neurological:      Mental Status: She is alert and oriented to person, place, and time.      Deep Tendon Reflexes: Reflexes are normal and symmetric.   Psychiatric:         Behavior: Behavior normal.         Thought Content: Thought content normal.         Judgment: Judgment normal.       Assessment & Plan   Diagnoses and all orders for this visit:    1. Epigastric pain (Primary)  -     Case Request; Standing  -     Case Request    2. Nonsmoker    3. Liver lesion  Comments:  nothing noted on MRI reviewed today    4. Nausea and vomiting, unspecified vomiting type    5. Gallbladder polyp  -     US Gallbladder; Future    6. Esophageal dysphagia    Other orders  -     Implement Anesthesia Orders Day of Procedure; Standing  -     Obtain Informed Consent; Standing  -     Follow Anesthesia Guidelines / Protocol; Future  -     Obtain Informed Consent; Future    Continue diet modications.   Continue Protonix as well  I discussed non pharmaceutical treatment of gerd.  This includes gradually losing weight to achieve ideal body wt., elevation of the head of bed by 4-6 inches, nothing to eat or drink 3 hours prior to lying down, avoiding tight clothing, stress reduction, tobacco cessation, reduction of alcohol intake, and dietary restrictions (avoiding caffeine, coffee, fatty foods, mints, chocolate, spicy foods and tomato based sauces as much as possible).  Will get endo to review  We discussed MRI findings.   Up to date recommendations: Small gallbladder polyp.   * Gallbladder polyps ?10 mm have an increased risk of malignancy. For polyps  ?10 to 20 mm, laparoscopic cholecystectomy with full thickness dissection is indicated. For polyps >20 mm, an extended cholecystectomy with lymph node dissection and partial hepatic resection in the gallbladder bed is indicated.  ¶ For example, cholecystectomy is indicated in patients with primary sclerosing cholangitis with gallbladder polyps >8 mm. Refer to UpToDate text for additional information related to the approach to gallbladder polyps in patients with primary sclerosing cholangitis.  ? An increase in size of >2 mm on imaging is likely to represent a clinically relevant increase in size and should prompt referral to a surgeon for cholecystectomy.  ? In patients who are unable or unwilling to undergo cholecystectomy, we perform a surveillance ultrasound at 6 months and then annually if stable in size.    ESOPHAGOGASTRODUODENOSCOPY WITH ANESTHESIA (N/A)  Part of this note may be an electronic transcription/translation of spoken language to printed text using the Dragon Dictation System.  Body mass index is 27.37 kg/m².  No follow-ups on file.  There are no Patient Instructions on file for this visit.         All risks, benefits, alternatives, and indications of colonoscopy and/or Endoscopy procedure have been discussed with the patient. Risks to include perforation of the colon requiring possible surgery or colostomy, risk of bleeding from biopsies or removal of colon tissue, possibility of missing a colon polyp or cancer, or adverse drug reaction.  Benefits to include the diagnosis and management of disease of the colon and rectum. Alternatives to include barium enema, radiographic evaluation, lab testing or no intervention. Pt verbalizes understanding and agrees.     Alicia Rankin, APRN  11/6/2023  10:36 CST          If you smoke or use tobacco, 4 minutes reading provided  Steps to Quit Smoking  Smoking tobacco can be harmful to your health and can affect almost every organ in your body. Smoking puts  you, and those around you, at risk for developing many serious chronic diseases. Quitting smoking is difficult, but it is one of the best things that you can do for your health. It is never too late to quit.  What are the benefits of quitting smoking?  When you quit smoking, you lower your risk of developing serious diseases and conditions, such as:  Lung cancer or lung disease, such as COPD.  Heart disease.  Stroke.  Heart attack.  Infertility.  Osteoporosis and bone fractures.  Additionally, symptoms such as coughing, wheezing, and shortness of breath may get better when you quit. You may also find that you get sick less often because your body is stronger at fighting off colds and infections. If you are pregnant, quitting smoking can help to reduce your chances of having a baby of low birth weight.  How do I get ready to quit?  When you decide to quit smoking, create a plan to make sure that you are successful. Before you quit:  Pick a date to quit. Set a date within the next two weeks to give you time to prepare.  Write down the reasons why you are quitting. Keep this list in places where you will see it often, such as on your bathroom mirror or in your car or wallet.  Identify the people, places, things, and activities that make you want to smoke (triggers) and avoid them. Make sure to take these actions:  Throw away all cigarettes at home, at work, and in your car.  Throw away smoking accessories, such as ashtrays and lighters.  Clean your car and make sure to empty the ashtray.  Clean your home, including curtains and carpets.  Tell your family, friends, and coworkers that you are quitting. Support from your loved ones can make quitting easier.  Talk with your health care provider about your options for quitting smoking.  Find out what treatment options are covered by your health insurance.  What strategies can I use to quit smoking?  Talk with your healthcare provider about different strategies to quit  smoking. Some strategies include:  Quitting smoking altogether instead of gradually lessening how much you smoke over a period of time. Research shows that quitting “cold turkey” is more successful than gradually quitting.  Attending in-person counseling to help you build problem-solving skills. You are more likely to have success in quitting if you attend several counseling sessions. Even short sessions of 10 minutes can be effective.  Finding resources and support systems that can help you to quit smoking and remain smoke-free after you quit. These resources are most helpful when you use them often. They can include:  Online chats with a counselor.  Telephone quitlines.  Printed self-help materials.  Support groups or group counseling.  Text messaging programs.  Mobile phone applications.  Taking medicines to help you quit smoking. (If you are pregnant or breastfeeding, talk with your health care provider first.) Some medicines contain nicotine and some do not. Both types of medicines help with cravings, but the medicines that include nicotine help to relieve withdrawal symptoms. Your health care provider may recommend:  Nicotine patches, gum, or lozenges.  Nicotine inhalers or sprays.  Non-nicotine medicine that is taken by mouth.  Talk with your health care provider about combining strategies, such as taking medicines while you are also receiving in-person counseling. Using these two strategies together makes you more likely to succeed in quitting than if you used either strategy on its own.  If you are pregnant or breastfeeding, talk with your health care provider about finding counseling or other support strategies to quit smoking. Do not take medicine to help you quit smoking unless told to do so by your health care provider.  What things can I do to make it easier to quit?  Quitting smoking might feel overwhelming at first, but there is a lot that you can do to make it easier. Take these important  actions:  Reach out to your family and friends and ask that they support and encourage you during this time. Call telephone quitlines, reach out to support groups, or work with a counselor for support.  Ask people who smoke to avoid smoking around you.  Avoid places that trigger you to smoke, such as bars, parties, or smoke-break areas at work.  Spend time around people who do not smoke.  Lessen stress in your life, because stress can be a smoking trigger for some people. To lessen stress, try:  Exercising regularly.  Deep-breathing exercises.  Yoga.  Meditating.  Performing a body scan. This involves closing your eyes, scanning your body from head to toe, and noticing which parts of your body are particularly tense. Purposefully relax the muscles in those areas.  Download or purchase mobile phone or tablet apps (applications) that can help you stick to your quit plan by providing reminders, tips, and encouragement. There are many free apps, such as QuitGuide from the CDC (Centers for Disease Control and Prevention). You can find other support for quitting smoking (smoking cessation) through smokefree.gov and other websites.  How will I feel when I quit smoking?  Within the first 24 hours of quitting smoking, you may start to feel some withdrawal symptoms. These symptoms are usually most noticeable 2-3 days after quitting, but they usually do not last beyond 2-3 weeks. Changes or symptoms that you might experience include:  Mood swings.  Restlessness, anxiety, or irritation.  Difficulty concentrating.  Dizziness.  Strong cravings for sugary foods in addition to nicotine.  Mild weight gain.  Constipation.  Nausea.  Coughing or a sore throat.  Changes in how your medicines work in your body.  A depressed mood.  Difficulty sleeping (insomnia).  After the first 2-3 weeks of quitting, you may start to notice more positive results, such as:  Improved sense of smell and taste.  Decreased coughing and sore throat.  Slower  heart rate.  Lower blood pressure.  Clearer skin.  The ability to breathe more easily.  Fewer sick days.  Quitting smoking is very challenging for most people. Do not get discouraged if you are not successful the first time. Some people need to make many attempts to quit before they achieve long-term success. Do your best to stick to your quit plan, and talk with your health care provider if you have any questions or concerns.  This information is not intended to replace advice given to you by your health care provider. Make sure you discuss any questions you have with your health care provider.  Document Released: 12/12/2002 Document Revised: 08/15/2017 Document Reviewed: 05/03/2016  [employer] Patient Education © 2017 [employer]

## 2023-11-06 NOTE — PROGRESS NOTES
Zena Zheng  1980 11/6/2023  Chief Complaint   Patient presents with    GI Problem     2 week fu liver lesion     Subjective   HPI  Zena Zheng is a 43 y.o. female who presents with a complaint of epigastric pain. I ordered MRI 1st due to a liver lesion noted on CT. MRI did not show this area.  Her pain continues. She also has dysphagia upper esophageal region. Food wont go down this is moderate for her occurring daily.    She has had weight loss of approx 20 lbs she says intentional and it has helped her blood pressure  Her stomach burns throughout with nausea no vomiting. She rates her stomach/epigastric pain 8 out of 10 at times. It is typically a 4 out of 10. It is daily. Eating does make it worse. No melena. No BRBPR.She has some dysphagia upper esophageal region. It is worse with solid foods. Moderate for her. Time makes it better. She does not take anything for acid reflux. She does not use ETOH. No hx for IV drugs.   Past Medical History:   Diagnosis Date    Anxiety     BV (bacterial vaginosis)     Depression      Past Surgical History:   Procedure Laterality Date    ANKLE SURGERY      TUBAL ABDOMINAL LIGATION         Outpatient Medications Marked as Taking for the 11/6/23 encounter (Office Visit) with Alicia Rankin APRN   Medication Sig Dispense Refill    buPROPion XL (WELLBUTRIN XL) 150 MG 24 hr tablet Take 1 tablet by mouth Daily.      fluticasone (FLONASE) 50 MCG/ACT nasal spray 1 spray by Each Nare route Daily.      hydrOXYzine (ATARAX) 25 MG tablet Take 1 tablet by mouth Daily.      lamoTRIgine 21 x 25 MG & 7 x 50 MG kit Take 50 mg by mouth Daily.      loratadine (Claritin) 10 MG tablet Take 1 tablet by mouth Daily.      metoprolol succinate XL (TOPROL-XL) 25 MG 24 hr tablet Take 1 tablet by mouth Daily.      pantoprazole (PROTONIX) 40 MG EC tablet Take 1 tablet by mouth Daily. 30 tablet 11    predniSONE (DELTASONE) 10 MG tablet Take 1 tablet by mouth Daily.      tiZANidine  (ZANAFLEX) 4 MG tablet Take 1 tablet by mouth 3 (Three) Times a Day.      triamcinolone (KENALOG) 0.1 % ointment Apply 1 application topically to the appropriate area as directed 2 (Two) Times a Day. 15 g 0    [DISCONTINUED] hydrOXYzine pamoate (VISTARIL) 25 MG capsule Take 1 capsule by mouth every night at bedtime.      [DISCONTINUED] lamoTRIgine (LaMICtal) 25 MG tablet Take 1 tablet by mouth Daily.       Allergies   Allergen Reactions    Doxycycline Hives and Itching    Erythromycin Hives and Itching    Sulfa Antibiotics Hives and Itching    Tape Rash     Social History     Socioeconomic History    Marital status:    Tobacco Use    Smoking status: Never    Smokeless tobacco: Never   Vaping Use    Vaping Use: Never used   Substance and Sexual Activity    Alcohol use: No    Drug use: Never    Sexual activity: Yes     Partners: Male     Birth control/protection: Surgical     Family History   Problem Relation Age of Onset    Colon polyps Mother     Arthritis Mother     Heart disease Mother     Stroke Father     No Known Problems Sister     No Known Problems Brother     No Known Problems Maternal Aunt     No Known Problems Paternal Aunt     No Known Problems Maternal Grandmother     No Known Problems Paternal Grandmother     No Known Problems Daughter     No Known Problems Son     BRCA 1/2 Neg Hx     Breast cancer Neg Hx     Colon cancer Neg Hx     Endometrial cancer Neg Hx     Ovarian cancer Neg Hx      Health Maintenance   Topic Date Due    COVID-19 Vaccine (1) Never done    HEPATITIS C SCREENING  Never done    ANNUAL PHYSICAL  Never done    INFLUENZA VACCINE  08/01/2023    BMI FOLLOWUP  09/27/2024    PAP SMEAR  09/18/2026    TDAP/TD VACCINES (3 - Td or Tdap) 12/18/2028    Pneumococcal Vaccine 0-64  Aged Out     Review of Systems   Constitutional:  Negative for activity change, appetite change, chills, diaphoresis, fatigue, fever and unexpected weight change.   HENT:  Negative for ear pain, hearing loss,  "mouth sores, sore throat, trouble swallowing and voice change.    Eyes: Negative.    Respiratory:  Negative for cough, choking, shortness of breath and wheezing.    Cardiovascular:  Negative for chest pain and palpitations.   Gastrointestinal:  Negative for abdominal pain, blood in stool, constipation, diarrhea, nausea and vomiting.   Endocrine: Negative for cold intolerance and heat intolerance.   Genitourinary:  Negative for decreased urine volume, dysuria, frequency, hematuria and urgency.   Musculoskeletal:  Negative for back pain, gait problem and myalgias.   Skin:  Negative for color change, pallor and rash.   Allergic/Immunologic: Negative for food allergies and immunocompromised state.   Neurological:  Negative for dizziness, tremors, seizures, syncope, weakness, light-headedness, numbness and headaches.   Hematological:  Negative for adenopathy. Does not bruise/bleed easily.   Psychiatric/Behavioral:  Negative for agitation and confusion. The patient is not nervous/anxious.    All other systems reviewed and are negative.    Objective   Vitals:    11/06/23 0950   BP: 145/80   BP Location: Left arm   Pulse: 75   Temp: 95.5 °F (35.3 °C)   TempSrc: Temporal   SpO2: 98%   Weight: 79.3 kg (174 lb 12.8 oz)   Height: 170.2 cm (67.01\")     Body mass index is 27.37 kg/m².  Physical Exam  Constitutional:       Appearance: She is well-developed.   HENT:      Head: Normocephalic and atraumatic.   Eyes:      Pupils: Pupils are equal, round, and reactive to light.   Neck:      Trachea: No tracheal deviation.   Cardiovascular:      Rate and Rhythm: Normal rate and regular rhythm.      Heart sounds: Normal heart sounds. No murmur heard.     No friction rub. No gallop.   Pulmonary:      Effort: Pulmonary effort is normal. No respiratory distress.      Breath sounds: Normal breath sounds. No wheezing or rales.   Chest:      Chest wall: No tenderness.   Abdominal:      General: Bowel sounds are normal. There is no distension. "      Palpations: Abdomen is soft. Abdomen is not rigid.      Tenderness: There is no abdominal tenderness. There is no guarding or rebound.   Musculoskeletal:         General: No tenderness or deformity. Normal range of motion.      Cervical back: Normal range of motion and neck supple.   Skin:     General: Skin is warm and dry.      Coloration: Skin is not pale.      Findings: No rash.   Neurological:      Mental Status: She is alert and oriented to person, place, and time.      Deep Tendon Reflexes: Reflexes are normal and symmetric.   Psychiatric:         Behavior: Behavior normal.         Thought Content: Thought content normal.         Judgment: Judgment normal.       Assessment & Plan   Diagnoses and all orders for this visit:    1. Epigastric pain (Primary)  -     Case Request; Standing  -     Case Request    2. Nonsmoker    3. Liver lesion  Comments:  nothing noted on MRI reviewed today    4. Nausea and vomiting, unspecified vomiting type    5. Gallbladder polyp  -     US Gallbladder; Future    6. Esophageal dysphagia    Other orders  -     Implement Anesthesia Orders Day of Procedure; Standing  -     Obtain Informed Consent; Standing  -     Follow Anesthesia Guidelines / Protocol; Future  -     Obtain Informed Consent; Future    Continue diet modications.   Continue Protonix as well  I discussed non pharmaceutical treatment of gerd.  This includes gradually losing weight to achieve ideal body wt., elevation of the head of bed by 4-6 inches, nothing to eat or drink 3 hours prior to lying down, avoiding tight clothing, stress reduction, tobacco cessation, reduction of alcohol intake, and dietary restrictions (avoiding caffeine, coffee, fatty foods, mints, chocolate, spicy foods and tomato based sauces as much as possible).  Will get endo to review  We discussed MRI findings.   Up to date recommendations: Small gallbladder polyp.   * Gallbladder polyps ?10 mm have an increased risk of malignancy. For polyps  ?10 to 20 mm, laparoscopic cholecystectomy with full thickness dissection is indicated. For polyps >20 mm, an extended cholecystectomy with lymph node dissection and partial hepatic resection in the gallbladder bed is indicated.  ¶ For example, cholecystectomy is indicated in patients with primary sclerosing cholangitis with gallbladder polyps >8 mm. Refer to UpToDate text for additional information related to the approach to gallbladder polyps in patients with primary sclerosing cholangitis.  ? An increase in size of >2 mm on imaging is likely to represent a clinically relevant increase in size and should prompt referral to a surgeon for cholecystectomy.  ? In patients who are unable or unwilling to undergo cholecystectomy, we perform a surveillance ultrasound at 6 months and then annually if stable in size.    ESOPHAGOGASTRODUODENOSCOPY WITH ANESTHESIA (N/A)  Part of this note may be an electronic transcription/translation of spoken language to printed text using the Dragon Dictation System.  Body mass index is 27.37 kg/m².  No follow-ups on file.  There are no Patient Instructions on file for this visit.         All risks, benefits, alternatives, and indications of colonoscopy and/or Endoscopy procedure have been discussed with the patient. Risks to include perforation of the colon requiring possible surgery or colostomy, risk of bleeding from biopsies or removal of colon tissue, possibility of missing a colon polyp or cancer, or adverse drug reaction.  Benefits to include the diagnosis and management of disease of the colon and rectum. Alternatives to include barium enema, radiographic evaluation, lab testing or no intervention. Pt verbalizes understanding and agrees.     Alicia Rankin, APRN  11/6/2023  10:36 CST          If you smoke or use tobacco, 4 minutes reading provided  Steps to Quit Smoking  Smoking tobacco can be harmful to your health and can affect almost every organ in your body. Smoking puts  you, and those around you, at risk for developing many serious chronic diseases. Quitting smoking is difficult, but it is one of the best things that you can do for your health. It is never too late to quit.  What are the benefits of quitting smoking?  When you quit smoking, you lower your risk of developing serious diseases and conditions, such as:  Lung cancer or lung disease, such as COPD.  Heart disease.  Stroke.  Heart attack.  Infertility.  Osteoporosis and bone fractures.  Additionally, symptoms such as coughing, wheezing, and shortness of breath may get better when you quit. You may also find that you get sick less often because your body is stronger at fighting off colds and infections. If you are pregnant, quitting smoking can help to reduce your chances of having a baby of low birth weight.  How do I get ready to quit?  When you decide to quit smoking, create a plan to make sure that you are successful. Before you quit:  Pick a date to quit. Set a date within the next two weeks to give you time to prepare.  Write down the reasons why you are quitting. Keep this list in places where you will see it often, such as on your bathroom mirror or in your car or wallet.  Identify the people, places, things, and activities that make you want to smoke (triggers) and avoid them. Make sure to take these actions:  Throw away all cigarettes at home, at work, and in your car.  Throw away smoking accessories, such as ashtrays and lighters.  Clean your car and make sure to empty the ashtray.  Clean your home, including curtains and carpets.  Tell your family, friends, and coworkers that you are quitting. Support from your loved ones can make quitting easier.  Talk with your health care provider about your options for quitting smoking.  Find out what treatment options are covered by your health insurance.  What strategies can I use to quit smoking?  Talk with your healthcare provider about different strategies to quit  smoking. Some strategies include:  Quitting smoking altogether instead of gradually lessening how much you smoke over a period of time. Research shows that quitting “cold turkey” is more successful than gradually quitting.  Attending in-person counseling to help you build problem-solving skills. You are more likely to have success in quitting if you attend several counseling sessions. Even short sessions of 10 minutes can be effective.  Finding resources and support systems that can help you to quit smoking and remain smoke-free after you quit. These resources are most helpful when you use them often. They can include:  Online chats with a counselor.  Telephone quitlines.  Printed self-help materials.  Support groups or group counseling.  Text messaging programs.  Mobile phone applications.  Taking medicines to help you quit smoking. (If you are pregnant or breastfeeding, talk with your health care provider first.) Some medicines contain nicotine and some do not. Both types of medicines help with cravings, but the medicines that include nicotine help to relieve withdrawal symptoms. Your health care provider may recommend:  Nicotine patches, gum, or lozenges.  Nicotine inhalers or sprays.  Non-nicotine medicine that is taken by mouth.  Talk with your health care provider about combining strategies, such as taking medicines while you are also receiving in-person counseling. Using these two strategies together makes you more likely to succeed in quitting than if you used either strategy on its own.  If you are pregnant or breastfeeding, talk with your health care provider about finding counseling or other support strategies to quit smoking. Do not take medicine to help you quit smoking unless told to do so by your health care provider.  What things can I do to make it easier to quit?  Quitting smoking might feel overwhelming at first, but there is a lot that you can do to make it easier. Take these important  actions:  Reach out to your family and friends and ask that they support and encourage you during this time. Call telephone quitlines, reach out to support groups, or work with a counselor for support.  Ask people who smoke to avoid smoking around you.  Avoid places that trigger you to smoke, such as bars, parties, or smoke-break areas at work.  Spend time around people who do not smoke.  Lessen stress in your life, because stress can be a smoking trigger for some people. To lessen stress, try:  Exercising regularly.  Deep-breathing exercises.  Yoga.  Meditating.  Performing a body scan. This involves closing your eyes, scanning your body from head to toe, and noticing which parts of your body are particularly tense. Purposefully relax the muscles in those areas.  Download or purchase mobile phone or tablet apps (applications) that can help you stick to your quit plan by providing reminders, tips, and encouragement. There are many free apps, such as QuitGuide from the CDC (Centers for Disease Control and Prevention). You can find other support for quitting smoking (smoking cessation) through smokefree.gov and other websites.  How will I feel when I quit smoking?  Within the first 24 hours of quitting smoking, you may start to feel some withdrawal symptoms. These symptoms are usually most noticeable 2-3 days after quitting, but they usually do not last beyond 2-3 weeks. Changes or symptoms that you might experience include:  Mood swings.  Restlessness, anxiety, or irritation.  Difficulty concentrating.  Dizziness.  Strong cravings for sugary foods in addition to nicotine.  Mild weight gain.  Constipation.  Nausea.  Coughing or a sore throat.  Changes in how your medicines work in your body.  A depressed mood.  Difficulty sleeping (insomnia).  After the first 2-3 weeks of quitting, you may start to notice more positive results, such as:  Improved sense of smell and taste.  Decreased coughing and sore throat.  Slower  heart rate.  Lower blood pressure.  Clearer skin.  The ability to breathe more easily.  Fewer sick days.  Quitting smoking is very challenging for most people. Do not get discouraged if you are not successful the first time. Some people need to make many attempts to quit before they achieve long-term success. Do your best to stick to your quit plan, and talk with your health care provider if you have any questions or concerns.  This information is not intended to replace advice given to you by your health care provider. Make sure you discuss any questions you have with your health care provider.  Document Released: 12/12/2002 Document Revised: 08/15/2017 Document Reviewed: 05/03/2016  [employer] Interactive Patient Education © 2017 [employer]

## 2023-11-13 ENCOUNTER — TELEPHONE (OUTPATIENT)
Dept: GASTROENTEROLOGY | Facility: HOSPITAL | Age: 43
End: 2023-11-13
Payer: COMMERCIAL

## 2023-11-13 ENCOUNTER — PREP FOR SURGERY (OUTPATIENT)
Dept: OTHER | Facility: HOSPITAL | Age: 43
End: 2023-11-13
Payer: COMMERCIAL

## 2023-11-13 ENCOUNTER — HOSPITAL ENCOUNTER (OUTPATIENT)
Facility: HOSPITAL | Age: 43
Setting detail: HOSPITAL OUTPATIENT SURGERY
Discharge: HOME | End: 2023-11-13
Attending: INTERNAL MEDICINE | Admitting: INTERNAL MEDICINE
Payer: COMMERCIAL

## 2023-11-13 VITALS
WEIGHT: 175 LBS | HEIGHT: 66 IN | BODY MASS INDEX: 28.12 KG/M2 | DIASTOLIC BLOOD PRESSURE: 88 MMHG | RESPIRATION RATE: 18 BRPM | SYSTOLIC BLOOD PRESSURE: 138 MMHG | OXYGEN SATURATION: 100 % | HEART RATE: 79 BPM | TEMPERATURE: 96.9 F

## 2023-11-13 DIAGNOSIS — R10.13 EPIGASTRIC PAIN: Primary | ICD-10-CM

## 2023-11-13 LAB — B-HCG UR QL: NEGATIVE

## 2023-11-13 PROCEDURE — G0463 HOSPITAL OUTPT CLINIC VISIT: HCPCS | Performed by: INTERNAL MEDICINE

## 2023-11-13 PROCEDURE — 81025 URINE PREGNANCY TEST: CPT | Performed by: ANESTHESIOLOGY

## 2023-11-13 RX ORDER — SODIUM CHLORIDE 9 MG/ML
500 INJECTION, SOLUTION INTRAVENOUS CONTINUOUS PRN
Status: DISCONTINUED | OUTPATIENT
Start: 2023-11-13 | End: 2023-11-13 | Stop reason: HOSPADM

## 2023-11-13 RX ORDER — LIDOCAINE HYDROCHLORIDE 10 MG/ML
0.5 INJECTION, SOLUTION EPIDURAL; INFILTRATION; INTRACAUDAL; PERINEURAL ONCE AS NEEDED
Status: DISCONTINUED | OUTPATIENT
Start: 2023-11-13 | End: 2023-11-13 | Stop reason: HOSPADM

## 2023-11-13 RX ORDER — SODIUM CHLORIDE 0.9 % (FLUSH) 0.9 %
10 SYRINGE (ML) INJECTION AS NEEDED
Status: DISCONTINUED | OUTPATIENT
Start: 2023-11-13 | End: 2023-11-13 | Stop reason: HOSPADM

## 2023-11-13 NOTE — TELEPHONE ENCOUNTER
Pt stopped by office after she was sent home from procedure. I have rescheduled her to Mon 12/4/2023 @ 11:00am. I will have Kathrin update scheduling. Pt was instructed to be NPO after midnight and to only have a SIP of water with her medications-pt MONET. Pt advised to call me back with any further questions/problems.

## 2023-11-13 NOTE — NURSING NOTE
Pt reported she had been drinking water and last sip was at 1155. Dr Hernandez notified and stated the patient could be done at 1400. Dr Anne notified and reported that the patient needed to reschedule. Pt instructed to walk next door and reschedule. Patient educated on diet and npo status prior to next appointment.

## 2023-11-20 ENCOUNTER — LAB (OUTPATIENT)
Dept: LAB | Facility: HOSPITAL | Age: 43
End: 2023-11-20
Payer: COMMERCIAL

## 2023-11-20 LAB — ALPHA-FETOPROTEIN: 2.96 NG/ML (ref 0–8.3)

## 2023-11-20 PROCEDURE — 82105 ALPHA-FETOPROTEIN SERUM: CPT | Performed by: CLINICAL NURSE SPECIALIST

## 2023-11-22 ENCOUNTER — TELEPHONE (OUTPATIENT)
Dept: GASTROENTEROLOGY | Facility: CLINIC | Age: 43
End: 2023-11-22
Payer: COMMERCIAL

## 2023-11-22 NOTE — TELEPHONE ENCOUNTER
Hub staff attempted to follow warm transfer process and was unsuccessful     Caller: Zena Zheng    Relationship to patient: Self    Best call back number: [phone]     Patient is needing: PATIENT STATES THAT SHE RECEIVED A MESSAGE STATING SHE NEEDED TO GET ANOTHER TEST DONE, TYPE OF TEST NOT SPECIFIED. SHE HAS ALREADY COMPLETED THE [facility] Tumor Marker LAB AND IS WANTING TO KNOW WHAT OTHER OTHER TEST THAT ES WAS WANTING HER TO HAVE DONE. PLEASE CALL BACK TO ADVISE.

## 2023-12-04 ENCOUNTER — HOSPITAL ENCOUNTER (OUTPATIENT)
Facility: HOSPITAL | Age: 43
Setting detail: HOSPITAL OUTPATIENT SURGERY
Discharge: HOME | End: 2023-12-04
Attending: INTERNAL MEDICINE | Admitting: INTERNAL MEDICINE
Payer: COMMERCIAL

## 2023-12-04 ENCOUNTER — ANESTHESIA EVENT (OUTPATIENT)
Dept: GASTROENTEROLOGY | Facility: HOSPITAL | Age: 43
End: 2023-12-04
Payer: COMMERCIAL

## 2023-12-04 ENCOUNTER — ANESTHESIA (OUTPATIENT)
Dept: GASTROENTEROLOGY | Facility: HOSPITAL | Age: 43
End: 2023-12-04
Payer: COMMERCIAL

## 2023-12-04 VITALS
DIASTOLIC BLOOD PRESSURE: 56 MMHG | SYSTOLIC BLOOD PRESSURE: 105 MMHG | WEIGHT: 170 LBS | OXYGEN SATURATION: 99 % | RESPIRATION RATE: 15 BRPM | HEIGHT: 66 IN | TEMPERATURE: 97.4 F | BODY MASS INDEX: 27.32 KG/M2 | HEART RATE: 50 BPM

## 2023-12-04 DIAGNOSIS — R10.13 EPIGASTRIC PAIN: Primary | ICD-10-CM

## 2023-12-04 LAB
ALBUMIN SERPL-MCNC: 4.8 G/DL (ref 3.5–5.2)
ALBUMIN/GLOB SERPL: 1.6 G/DL
ALP SERPL-CCNC: 78 U/L (ref 39–117)
ALT SERPL W P-5'-P-CCNC: 11 U/L (ref 1–33)
AMYLASE SERPL-CCNC: 25 U/L (ref 28–100)
ANION GAP SERPL CALCULATED.3IONS-SCNC: 13 MMOL/L (ref 5–15)
AST SERPL-CCNC: 11 U/L (ref 1–32)
B-HCG UR QL: NEGATIVE
BASOPHILS # BLD AUTO: 0.05 10*3/MM3 (ref 0–0.2)
BASOPHILS NFR BLD AUTO: 0.5 % (ref 0–1.5)
BILIRUB SERPL-MCNC: 0.5 MG/DL (ref 0–1.2)
BUN SERPL-MCNC: 8 MG/DL (ref 6–20)
BUN/CREAT SERPL: 10.4 (ref 7–25)
CALCIUM SPEC-SCNC: 9.7 MG/DL (ref 8.6–10.5)
CHLORIDE SERPL-SCNC: 103 MMOL/L (ref 98–107)
CO2 SERPL-SCNC: 24 MMOL/L (ref 22–29)
CREAT SERPL-MCNC: 0.77 MG/DL (ref 0.57–1)
DEPRECATED RDW RBC AUTO: 45.9 FL (ref 37–54)
EGFRCR SERPLBLD CKD-EPI 2021: 97.7 ML/MIN/1.73
EOSINOPHIL # BLD AUTO: 0.04 10*3/MM3 (ref 0–0.4)
EOSINOPHIL NFR BLD AUTO: 0.4 % (ref 0.3–6.2)
ERYTHROCYTE [DISTWIDTH] IN BLOOD BY AUTOMATED COUNT: 12.9 % (ref 12.3–15.4)
GLOBULIN UR ELPH-MCNC: 3 GM/DL
GLUCOSE SERPL-MCNC: 83 MG/DL (ref 65–99)
HCT VFR BLD AUTO: 45.9 % (ref 34–46.6)
HGB BLD-MCNC: 14.3 G/DL (ref 12–15.9)
IMM GRANULOCYTES # BLD AUTO: 0.03 10*3/MM3 (ref 0–0.05)
IMM GRANULOCYTES NFR BLD AUTO: 0.3 % (ref 0–0.5)
LIPASE SERPL-CCNC: 19 U/L (ref 13–60)
LYMPHOCYTES # BLD AUTO: 2.71 10*3/MM3 (ref 0.7–3.1)
LYMPHOCYTES NFR BLD AUTO: 24.5 % (ref 19.6–45.3)
MCH RBC QN AUTO: 30 PG (ref 26.6–33)
MCHC RBC AUTO-ENTMCNC: 31.2 G/DL (ref 31.5–35.7)
MCV RBC AUTO: 96.4 FL (ref 79–97)
MONOCYTES # BLD AUTO: 0.54 10*3/MM3 (ref 0.1–0.9)
MONOCYTES NFR BLD AUTO: 4.9 % (ref 5–12)
NEUTROPHILS NFR BLD AUTO: 69.4 % (ref 42.7–76)
NEUTROPHILS NFR BLD AUTO: 7.69 10*3/MM3 (ref 1.7–7)
NRBC BLD AUTO-RTO: 0 /100 WBC (ref 0–0.2)
PLATELET # BLD AUTO: 349 10*3/MM3 (ref 140–450)
PMV BLD AUTO: 10.3 FL (ref 6–12)
POTASSIUM SERPL-SCNC: 3.4 MMOL/L (ref 3.5–5.2)
PROT SERPL-MCNC: 7.8 G/DL (ref 6–8.5)
RBC # BLD AUTO: 4.76 10*6/MM3 (ref 3.77–5.28)
SODIUM SERPL-SCNC: 140 MMOL/L (ref 136–145)
WBC NRBC COR # BLD AUTO: 11.06 10*3/MM3 (ref 3.4–10.8)

## 2023-12-04 PROCEDURE — 80053 COMPREHEN METABOLIC PANEL: CPT | Performed by: INTERNAL MEDICINE

## 2023-12-04 PROCEDURE — 82150 ASSAY OF AMYLASE: CPT | Performed by: INTERNAL MEDICINE

## 2023-12-04 PROCEDURE — 83690 ASSAY OF LIPASE: CPT | Performed by: INTERNAL MEDICINE

## 2023-12-04 PROCEDURE — C1726 CATH, BAL DIL, NON-VASCULAR: HCPCS | Performed by: INTERNAL MEDICINE

## 2023-12-04 PROCEDURE — 81025 URINE PREGNANCY TEST: CPT | Performed by: ANESTHESIOLOGY

## 2023-12-04 PROCEDURE — 25810000003 SODIUM CHLORIDE 0.9 % SOLUTION: Performed by: ANESTHESIOLOGY

## 2023-12-04 PROCEDURE — 85025 COMPLETE CBC W/AUTO DIFF WBC: CPT | Performed by: INTERNAL MEDICINE

## 2023-12-04 PROCEDURE — 25010000002 PROPOFOL 10 MG/ML EMULSION: Performed by: NURSE ANESTHETIST, CERTIFIED REGISTERED

## 2023-12-04 RX ORDER — SODIUM CHLORIDE 9 MG/ML
40 INJECTION, SOLUTION INTRAVENOUS AS NEEDED
Status: CANCELLED | OUTPATIENT
Start: 2023-12-04

## 2023-12-04 RX ORDER — PROPOFOL 10 MG/ML
VIAL (ML) INTRAVENOUS AS NEEDED
Status: DISCONTINUED | OUTPATIENT
Start: 2023-12-04 | End: 2023-12-04 | Stop reason: SURG

## 2023-12-04 RX ORDER — SODIUM CHLORIDE 0.9 % (FLUSH) 0.9 %
10 SYRINGE (ML) INJECTION AS NEEDED
Status: CANCELLED | OUTPATIENT
Start: 2023-12-04

## 2023-12-04 RX ORDER — SODIUM CHLORIDE 0.9 % (FLUSH) 0.9 %
10 SYRINGE (ML) INJECTION EVERY 12 HOURS SCHEDULED
Status: CANCELLED | OUTPATIENT
Start: 2023-12-04

## 2023-12-04 RX ORDER — SODIUM CHLORIDE 9 MG/ML
100 INJECTION, SOLUTION INTRAVENOUS CONTINUOUS
Status: CANCELLED | OUTPATIENT
Start: 2023-12-04

## 2023-12-04 RX ORDER — SODIUM CHLORIDE 9 MG/ML
500 INJECTION, SOLUTION INTRAVENOUS CONTINUOUS PRN
Status: DISCONTINUED | OUTPATIENT
Start: 2023-12-04 | End: 2023-12-04 | Stop reason: HOSPADM

## 2023-12-04 RX ORDER — SODIUM CHLORIDE 0.9 % (FLUSH) 0.9 %
10 SYRINGE (ML) INJECTION AS NEEDED
Status: DISCONTINUED | OUTPATIENT
Start: 2023-12-04 | End: 2023-12-04 | Stop reason: HOSPADM

## 2023-12-04 RX ORDER — LIDOCAINE HYDROCHLORIDE 10 MG/ML
0.5 INJECTION, SOLUTION EPIDURAL; INFILTRATION; INTRACAUDAL; PERINEURAL ONCE AS NEEDED
Status: DISCONTINUED | OUTPATIENT
Start: 2023-12-04 | End: 2023-12-04 | Stop reason: HOSPADM

## 2023-12-04 RX ORDER — LIDOCAINE HYDROCHLORIDE 20 MG/ML
INJECTION, SOLUTION EPIDURAL; INFILTRATION; INTRACAUDAL; PERINEURAL AS NEEDED
Status: DISCONTINUED | OUTPATIENT
Start: 2023-12-04 | End: 2023-12-04 | Stop reason: SURG

## 2023-12-04 RX ADMIN — PROPOFOL INJECTABLE EMULSION 300 MG: 10 INJECTION, EMULSION INTRAVENOUS at 12:23

## 2023-12-04 RX ADMIN — SODIUM CHLORIDE: 0.9 INJECTION, SOLUTION INTRAVENOUS at 12:21

## 2023-12-04 RX ADMIN — LIDOCAINE HYDROCHLORIDE 140 MG: 20 INJECTION, SOLUTION EPIDURAL; INFILTRATION; INTRACAUDAL; PERINEURAL at 12:23

## 2023-12-04 RX ADMIN — GLYCOPYRROLATE 0.1 MG: 0.2 INJECTION INTRAMUSCULAR; INTRAVENOUS at 12:21

## 2023-12-04 NOTE — ANESTHESIA POSTPROCEDURE EVALUATION
Patient: Zena Zheng    Procedure Summary       Date: 12/04/23 Room / Location: [facility] ENDOSCOPY 6 / [facility]    Anesthesia Start: 1221 Anesthesia Stop: 1236    Procedure: ESOPHAGOGASTRODUODENOSCOPY WITH ANESTHESIA Diagnosis:       Epigastric pain      (Epigastric pain [R10.13])    Surgeons: Rosa Anne MD Provider: Shelby Leigh CRNA    Anesthesia Type: MAC ASA Status: 2            Anesthesia Type: MAC    Vitals  Vitals Value Taken Time   /69 12/04/23 1306   Temp     Pulse 47 12/04/23 1306   Resp 19 12/04/23 1235   SpO2 100 % 12/04/23 1306   Vitals shown include unfiled device data.        Post Anesthesia Care and Evaluation    Patient location during evaluation: PHASE II  Patient participation: complete - patient participated  Level of consciousness: awake and alert  Pain management: adequate    Airway patency: patent  Anesthetic complications: No anesthetic complications  PONV Status: none  Cardiovascular status: acceptable  Respiratory status: acceptable  Hydration status: acceptable  No anesthesia care post op

## 2023-12-04 NOTE — ANESTHESIA PREPROCEDURE EVALUATION
Anesthesia Evaluation     Patient summary reviewed   no history of anesthetic complications:   NPO Solid Status: > 8 hours             Airway   Mallampati: I  Dental      Pulmonary    (-) sleep apnea, no home oxygen  Cardiovascular - negative cardio ROS        Neuro/Psych  GI/Hepatic/Renal/Endo      Musculoskeletal     Abdominal    Substance History      OB/GYN          Other                          Anesthesia Plan    ASA 2     MAC     intravenous induction     Anesthetic plan, risks, benefits, and alternatives have been provided, discussed and informed consent has been obtained with: patient.        CODE STATUS:

## 2023-12-19 ENCOUNTER — HOSPITAL ENCOUNTER (EMERGENCY)
Facility: HOSPITAL | Age: 43
Discharge: HOME | End: 2023-12-19
Attending: EMERGENCY MEDICINE | Admitting: EMERGENCY MEDICINE
Payer: COMMERCIAL

## 2023-12-19 VITALS
TEMPERATURE: 97.6 F | OXYGEN SATURATION: 99 % | RESPIRATION RATE: 16 BRPM | HEART RATE: 56 BPM | BODY MASS INDEX: 28.28 KG/M2 | HEIGHT: 66 IN | DIASTOLIC BLOOD PRESSURE: 76 MMHG | SYSTOLIC BLOOD PRESSURE: 125 MMHG | WEIGHT: 176 LBS

## 2023-12-19 DIAGNOSIS — S16.1XXA ACUTE STRAIN OF NECK MUSCLE, INITIAL ENCOUNTER: ICD-10-CM

## 2023-12-19 DIAGNOSIS — S46.912A STRAIN OF LEFT SHOULDER, INITIAL ENCOUNTER: ICD-10-CM
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
## 2023-12-19 PROCEDURE — 96372 THER/PROPH/DIAG INJ SC/IM: CPT

## 2023-12-19 PROCEDURE — 25010000002 KETOROLAC TROMETHAMINE PER 15 MG: Performed by: EMERGENCY MEDICINE

## 2023-12-19 PROCEDURE — 99283 EMERGENCY DEPT VISIT LOW MDM: CPT

## 2023-12-19 RX ORDER — CYCLOBENZAPRINE HCL 10 MG
10 TABLET ORAL ONCE
Status: COMPLETED | OUTPATIENT
Start: 2023-12-19 | End: 2023-12-19

## 2023-12-19 RX ORDER — KETOROLAC TROMETHAMINE 30 MG/ML
30 INJECTION, SOLUTION INTRAMUSCULAR; INTRAVENOUS EVERY 6 HOURS PRN
Status: DISCONTINUED | OUTPATIENT
Start: 2023-12-19 | End: 2023-12-19 | Stop reason: HOSPADM

## 2023-12-19 RX ORDER — NAPROXEN 500 MG/1
500 TABLET ORAL 2 TIMES DAILY PRN
Qty: 20 TABLET | Refills: 0 | Status: SHIPPED | OUTPATIENT
Start: 2023-12-19

## 2023-12-19 RX ADMIN — KETOROLAC TROMETHAMINE 30 MG: 30 INJECTION, SOLUTION INTRAMUSCULAR; INTRAVENOUS at 07:49

## 2023-12-19 RX ADMIN — CYCLOBENZAPRINE 10 MG: 10 TABLET, FILM COATED ORAL at 07:48
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
## 2023-12-19 NOTE — Clinical Note
[facility] EMERGENCY DEPARTMENT  [address]  [facility] [address]  Phone: [phone]    Zena Zheng was seen and treated in our emergency department on 12/19/2023.  She may return to work on 12/20/2023.         Thank you for choosing [facility].    Delroy Myers MD

## 2024-02-14 ENCOUNTER — TELEPHONE (OUTPATIENT)
Dept: GASTROENTEROLOGY | Facility: CLINIC | Age: 44
End: 2024-02-14
Payer: COMMERCIAL

## 2024-02-15 ENCOUNTER — HOSPITAL ENCOUNTER (OUTPATIENT)
Dept: ULTRASOUND IMAGING | Facility: HOSPITAL | Age: 44
Discharge: HOME | End: 2024-02-15
Admitting: CLINICAL NURSE SPECIALIST
Payer: COMMERCIAL

## 2024-02-15 DIAGNOSIS — R19.5 STOOL MUCUS: ICD-10-CM

## 2024-02-15 DIAGNOSIS — K82.4 GALLBLADDER POLYP: ICD-10-CM

## 2024-02-15 DIAGNOSIS — R19.7 DIARRHEA, UNSPECIFIED TYPE: Primary | ICD-10-CM

## 2024-02-15 PROCEDURE — 76705 ECHO EXAM OF ABDOMEN: CPT

## 2024-02-16 ENCOUNTER — LAB (OUTPATIENT)
Dept: LAB | Facility: HOSPITAL | Age: 44
End: 2024-02-16
Payer: COMMERCIAL

## 2024-02-16 DIAGNOSIS — R19.7 DIARRHEA, UNSPECIFIED TYPE: ICD-10-CM

## 2024-02-16 DIAGNOSIS — R19.5 STOOL MUCUS: ICD-10-CM

## 2024-02-16 LAB
ADV 40+41 DNA STL QL NAA+NON-PROBE: NOT DETECTED
ASTRO TYP 1-8 RNA STL QL NAA+NON-PROBE: NOT DETECTED
C CAYETANENSIS DNA STL QL NAA+NON-PROBE: NOT DETECTED
C COLI+JEJ+UPSA DNA STL QL NAA+NON-PROBE: NOT DETECTED
CRYPTOSP DNA STL QL NAA+NON-PROBE: NOT DETECTED
E HISTOLYT DNA STL QL NAA+NON-PROBE: NOT DETECTED
EAEC PAA PLAS AGGR+AATA ST NAA+NON-PRB: NOT DETECTED
EC STX1+STX2 GENES STL QL NAA+NON-PROBE: NOT DETECTED
EPEC EAE GENE STL QL NAA+NON-PROBE: NOT DETECTED
ETEC LTA+ST1A+ST1B TOX ST NAA+NON-PROBE: NOT DETECTED
G LAMBLIA DNA STL QL NAA+NON-PROBE: NOT DETECTED
LACTOFERRIN STL QL LA: POSITIVE
NOROVIRUS GI+II RNA STL QL NAA+NON-PROBE: NOT DETECTED
P SHIGELLOIDES DNA STL QL NAA+NON-PROBE: NOT DETECTED
RVA RNA STL QL NAA+NON-PROBE: NOT DETECTED
S ENT+BONG DNA STL QL NAA+NON-PROBE: NOT DETECTED
SAPO I+II+IV+V RNA STL QL NAA+NON-PROBE: NOT DETECTED
SHIGELLA SP+EIEC IPAH ST NAA+NON-PROBE: NOT DETECTED
V CHOL+PARA+VUL DNA STL QL NAA+NON-PROBE: NOT DETECTED
V CHOLERAE DNA STL QL NAA+NON-PROBE: NOT DETECTED
Y ENTEROCOL DNA STL QL NAA+NON-PROBE: NOT DETECTED

## 2024-02-16 PROCEDURE — 87209 SMEAR COMPLEX STAIN: CPT

## 2024-02-16 PROCEDURE — 83630 LACTOFERRIN FECAL (QUAL): CPT

## 2024-02-16 PROCEDURE — 87177 OVA AND PARASITES SMEARS: CPT

## 2024-02-16 PROCEDURE — 87507 IADNA-DNA/RNA PROBE TQ 12-25: CPT

## 2024-02-19 ENCOUNTER — TELEPHONE (OUTPATIENT)
Dept: GASTROENTEROLOGY | Facility: CLINIC | Age: 44
End: 2024-02-19
Payer: COMMERCIAL

## 2024-02-19 NOTE — TELEPHONE ENCOUNTER
Pt called me requesting results from her recent GB US and stool studies she had done. I did advise you were out of office today, but I would send you a message and call her back with your recommendations as soon as you've had a chance to review them.

## 2024-02-20 NOTE — TELEPHONE ENCOUNTER
Called and spoke to pt re: results-she VU. I have placed her in recall for CT 6 months after MRI per Ramila-she will be due in April. I have already printed that recall list so I have placed her in recall for June-she will show up on my list in April since I work the list 2 months ahead of time. I will call her at that point to discuss how to get this test ordered/scheduled. She does have a f/u with Ramila in March-we may be able to arrange from that OV-I will have Chastity place reminder in chart to have Ramila discuss with pt at that time. However, she is in recall as a backup. Pt advised to call me back with any further questions/problems.

## 2024-02-20 NOTE — TELEPHONE ENCOUNTER
Alicia Rankin APRN Garner, Rachel E, MA  Let her know that ultrasound showed small gallbaldder polyp which is likely benign. The finding previously noted next to the liver suggested a repeat CT in 6 months to follow. MRI did not reveal it.  PUt her in for recall CT in 6 months. Otherwise unremarkable. Some sludge noted no stones or inflammation.  AARON Blanchard  2/20/2024  08:57 CST

## 2024-02-22 LAB
O+P SPEC MICRO: NORMAL
O+P STL CONC: NORMAL

## 2024-04-01 ENCOUNTER — TELEPHONE (OUTPATIENT)
Dept: GASTROENTEROLOGY | Facility: CLINIC | Age: 44
End: 2024-04-01
Payer: COMMERCIAL

## 2024-04-01 NOTE — TELEPHONE ENCOUNTER
Maricarmen is on my recall list because she is due for repeat CT in April. I called and spoke to her about it-she is agreeable to proceeding. I told her I would send you a message to place that order. She knows you are out this week and I will call her next week once orders are placed to get her to scheduling to arrange.

## 2024-04-08 DIAGNOSIS — K76.9 LIVER LESION: Primary | ICD-10-CM

## 2024-04-09 NOTE — TELEPHONE ENCOUNTER
Called pt to update her about orders being placed-was unable to reach her so I left detailed VM for her. I advised on VM that she could just call scheduling herself to arrange and left their number or they should reach out to her in a few days. I also left my direct line to call back with questions/problems or if she doesn't hear from scheduling.    [facility] to schedule A1C appt.

## 2024-06-08 ENCOUNTER — APPOINTMENT (OUTPATIENT)
Dept: GENERAL RADIOLOGY | Facility: HOSPITAL | Age: 44
End: 2024-06-08
Payer: COMMERCIAL

## 2024-06-08 PROCEDURE — 72050 X-RAY EXAM NECK SPINE 4/5VWS: CPT

## 2024-06-18 ENCOUNTER — OFFICE VISIT (OUTPATIENT)
Dept: INTERNAL MEDICINE | Facility: CLINIC | Age: 44
End: 2024-06-18
Payer: COMMERCIAL

## 2024-06-18 ENCOUNTER — LAB (OUTPATIENT)
Dept: LAB | Facility: HOSPITAL | Age: 44
End: 2024-06-18
Payer: COMMERCIAL

## 2024-06-18 VITALS
HEART RATE: 79 BPM | WEIGHT: 187.1 LBS | SYSTOLIC BLOOD PRESSURE: 120 MMHG | OXYGEN SATURATION: 97 % | HEIGHT: 66 IN | TEMPERATURE: 97.2 F | DIASTOLIC BLOOD PRESSURE: 80 MMHG | BODY MASS INDEX: 30.07 KG/M2

## 2024-06-18 DIAGNOSIS — F41.9 ANXIETY: ICD-10-CM

## 2024-06-18 DIAGNOSIS — G89.29 CHRONIC LEFT SHOULDER PAIN: Primary | ICD-10-CM

## 2024-06-18 DIAGNOSIS — M62.838 MUSCLE SPASMS OF NECK: ICD-10-CM

## 2024-06-18 DIAGNOSIS — Z12.31 ENCOUNTER FOR SCREENING MAMMOGRAM FOR MALIGNANT NEOPLASM OF BREAST: ICD-10-CM

## 2024-06-18 DIAGNOSIS — Z79.899 HIGH RISK MEDICATION USE: ICD-10-CM

## 2024-06-18 DIAGNOSIS — Z11.59 ENCOUNTER FOR HEPATITIS C SCREENING TEST FOR LOW RISK PATIENT: ICD-10-CM

## 2024-06-18 DIAGNOSIS — M25.512 CHRONIC LEFT SHOULDER PAIN: Primary | ICD-10-CM

## 2024-06-18 DIAGNOSIS — Z12.11 COLON CANCER SCREENING: ICD-10-CM

## 2024-06-18 DIAGNOSIS — M79.7 FIBROMYALGIA: ICD-10-CM

## 2024-06-18 DIAGNOSIS — Z00.00 HEALTHCARE MAINTENANCE: ICD-10-CM

## 2024-06-18 LAB
ALBUMIN SERPL-MCNC: 4.4 G/DL (ref 3.5–5.2)
ALBUMIN/GLOB SERPL: 1.6 G/DL
ALP SERPL-CCNC: 72 U/L (ref 39–117)
ALT SERPL W P-5'-P-CCNC: 9 U/L (ref 1–33)
AMPHET+METHAMPHET UR QL: NEGATIVE
AMPHETAMINES UR QL: NEGATIVE
ANION GAP SERPL CALCULATED.3IONS-SCNC: 6 MMOL/L (ref 5–15)
AST SERPL-CCNC: 13 U/L (ref 1–32)
BARBITURATES UR QL SCN: NEGATIVE
BENZODIAZ UR QL SCN: NEGATIVE
BILIRUB SERPL-MCNC: 0.5 MG/DL (ref 0–1.2)
BUN SERPL-MCNC: 7 MG/DL (ref 6–20)
BUN/CREAT SERPL: 8.4 (ref 7–25)
BUPRENORPHINE SERPL-MCNC: NEGATIVE NG/ML
CALCIUM SPEC-SCNC: 9.8 MG/DL (ref 8.6–10.5)
CANNABINOIDS SERPL QL: NEGATIVE
CHLORIDE SERPL-SCNC: 104 MMOL/L (ref 98–107)
CHOLEST SERPL-MCNC: 173 MG/DL (ref 0–200)
CO2 SERPL-SCNC: 28 MMOL/L (ref 22–29)
COCAINE UR QL: NEGATIVE
CREAT SERPL-MCNC: 0.83 MG/DL (ref 0.57–1)
DEPRECATED RDW RBC AUTO: 42.8 FL (ref 37–54)
EGFRCR SERPLBLD CKD-EPI 2021: 88.7 ML/MIN/1.73
ERYTHROCYTE [DISTWIDTH] IN BLOOD BY AUTOMATED COUNT: 13.2 % (ref 12.3–15.4)
FENTANYL UR-MCNC: NEGATIVE NG/ML
GLOBULIN UR ELPH-MCNC: 2.8 GM/DL
GLUCOSE SERPL-MCNC: 120 MG/DL (ref 65–99)
HBA1C MFR BLD: 5.6 % (ref 4.8–5.6)
HCT VFR BLD AUTO: 40.3 % (ref 34–46.6)
HDLC SERPL-MCNC: 54 MG/DL (ref 40–60)
HGB BLD-MCNC: 12.9 G/DL (ref 12–15.9)
LDLC SERPL CALC-MCNC: 103 MG/DL (ref 0–100)
LDLC/HDLC SERPL: 1.89 {RATIO}
MCH RBC QN AUTO: 28.5 PG (ref 26.6–33)
MCHC RBC AUTO-ENTMCNC: 32 G/DL (ref 31.5–35.7)
MCV RBC AUTO: 89 FL (ref 79–97)
METHADONE UR QL SCN: NEGATIVE
OPIATES UR QL: NEGATIVE
OXYCODONE UR QL SCN: NEGATIVE
PCP UR QL SCN: NEGATIVE
PLATELET # BLD AUTO: 402 10*3/MM3 (ref 140–450)
PMV BLD AUTO: 10.3 FL (ref 6–12)
POTASSIUM SERPL-SCNC: 4.3 MMOL/L (ref 3.5–5.2)
PROT SERPL-MCNC: 7.2 G/DL (ref 6–8.5)
RBC # BLD AUTO: 4.53 10*6/MM3 (ref 3.77–5.28)
SODIUM SERPL-SCNC: 138 MMOL/L (ref 136–145)
TRICYCLICS UR QL SCN: NEGATIVE
TRIGL SERPL-MCNC: 85 MG/DL (ref 0–150)
VLDLC SERPL-MCNC: 16 MG/DL (ref 5–40)
WBC NRBC COR # BLD AUTO: 9.81 10*3/MM3 (ref 3.4–10.8)

## 2024-06-18 PROCEDURE — 80307 DRUG TEST PRSMV CHEM ANLYZR: CPT

## 2024-06-18 PROCEDURE — 85027 COMPLETE CBC AUTOMATED: CPT

## 2024-06-18 PROCEDURE — 80053 COMPREHEN METABOLIC PANEL: CPT

## 2024-06-18 PROCEDURE — 80061 LIPID PANEL: CPT

## 2024-06-18 PROCEDURE — 83036 HEMOGLOBIN GLYCOSYLATED A1C: CPT

## 2024-06-18 PROCEDURE — 99214 OFFICE O/P EST MOD 30 MIN: CPT | Performed by: INTERNAL MEDICINE

## 2024-06-18 PROCEDURE — 86803 HEPATITIS C AB TEST: CPT

## 2024-06-18 PROCEDURE — 36415 COLL VENOUS BLD VENIPUNCTURE: CPT

## 2024-06-18 NOTE — PROGRESS NOTES
Chief Complaint   Patient presents with    \[facility] [place] [facility]\"" Care    Shoulder Pain     Patient complains of left shoulder pain          History:  Zena Zheng is a 44 y.o. female who presents today for evaluation of the above problems.      HPI  History of Present Illness  The patient presents for evaluation of left shoulder pain.    The patient has been experiencing left shoulder pain for approximately 10 months, which she attributes to an injury sustained during exercise. Despite her efforts to manage the pain through stretching exercises provided by her primary care physician (Dr. Last), rest, ice, and heat application, the pain has not improved. An x-ray of the shoulder was conducted by Dr. Last, which did not reveal any bone abnormalities. The pain is localized behind the shoulder blade and on the anterior shoulder. The patient is unable to take NSAIDs due to gastrointestinal upset. In case of severe inflammation, she resorts to a steroid pack and manages the pain with Tylenol. A cervical neck x-ray was conducted by urgent care.  This revealed decreased cervical lordosis. The patient, who works two jobs, works over 60 hours per week, expresses a desire to consult with an orthopedic doctor.    The patient is diagnosed with fibromyalgia and neuropathy, characterized by pain in her knees and hips, sciatica pain, and shoulder discomfort. She also experiences radiating pain down her arm, accompanied by a intermittent cold sensation in her arm. Her fibromyalgia pain is managed with Lyrica, which seems to exacerbate her ankle and feet swelling. She was previously on gabapentin 300 mg twice daily, which was more effective than Lyrica. She also takes Zanaflex, which provides relief for her fibromyalgia. She has maintained mobility through YouTube videos,  and massages.    The patient has not undergone a mammogram or colon cancer screening. She is not diabetic but expresses a desire to have her A1c levels  checked.    Supplemental Information  She takes hydroxyzine for anxiety and allergies. She takes Lamictal for bipolar disorder. She is off of her depression medications right now.  She sees [facility] for these issues.   Her mother had colon polyps.    ROS:  Review of Systems  As above    Current Outpatient Medications:     Azelastine HCl 137 MCG/SPRAY solution, SPRAY 1 TO 2 SPRAYS INTO EACH NOSTRIL TWICE A DAY AS NEEDED, Disp: , Rfl:     fluticasone (FLONASE) 50 MCG/ACT nasal spray, 1 spray by Each Nare route Daily., Disp: , Rfl:     hydrOXYzine (ATARAX) 25 MG tablet, Take 1 tablet by mouth Daily., Disp: , Rfl:     hydrOXYzine pamoate (VISTARIL) 25 MG capsule, , Disp: , Rfl:     lamoTRIgine (LaMICtal) 25 MG tablet, 2 TABLETS NIGHTLY, Disp: , Rfl:     loratadine (Claritin) 10 MG tablet, Take 1 tablet by mouth Daily., Disp: , Rfl:     metoprolol succinate XL (TOPROL-XL) 25 MG 24 hr tablet, Take 1 tablet by mouth Daily., Disp: , Rfl:     ondansetron (ZOFRAN) 4 MG tablet, Every 8 (Eight) Hours., Disp: , Rfl:     pimecrolimus (ELIDEL) 1 % cream, APPLY TO FULL FACE EVERY NIGHT, Disp: , Rfl:     pregabalin (LYRICA) 75 MG capsule, Take 1 capsule by mouth Every 12 (Twelve) Hours., Disp: , Rfl:     tiZANidine (ZANAFLEX) 4 MG tablet, Take 1 tablet by mouth 3 (Three) Times a Day., Disp: , Rfl:     Lab Results   Component Value Date    GLUCOSE 83 12/04/2023    BUN 8 12/04/2023    CREATININE 0.77 12/04/2023    EGFR 97.7 12/04/2023    BCR 10.4 12/04/2023    K 3.4 (L) 12/04/2023    CO2 24.0 12/04/2023    CALCIUM 9.7 12/04/2023    ALBUMIN 4.8 12/04/2023    BILITOT 0.5 12/04/2023    AST 11 12/04/2023    ALT 11 12/04/2023       WBC   Date Value Ref Range Status   12/04/2023 11.06 (H) 3.40 - 10.80 10*3/mm3 Final   02/21/2023 10.4 4.8 - 10.8 K/uL Final     RBC   Date Value Ref Range Status   12/04/2023 4.76 3.77 - 5.28 10*6/mm3 Final   02/21/2023 4.47 4.20 - 5.40 M/uL Final     Hemoglobin   Date Value Ref Range Status   12/04/2023  14.3 12.0 - 15.9 g/dL Final   02/21/2023 13.5 12.0 - 16.0 g/dL Final     Hematocrit   Date Value Ref Range Status   12/04/2023 45.9 34.0 - 46.6 % Final   02/21/2023 41.9 37.0 - 47.0 % Final     MCV   Date Value Ref Range Status   12/04/2023 96.4 79.0 - 97.0 fL Final   02/21/2023 93.7 81.0 - 99.0 fL Final     MCH   Date Value Ref Range Status   12/04/2023 30.0 26.6 - 33.0 pg Final   02/21/2023 30.2 27.0 - 31.0 pg Final     MCHC   Date Value Ref Range Status   12/04/2023 31.2 (L) 31.5 - 35.7 g/dL Final   02/21/2023 32.2 (L) 33.0 - 37.0 g/dL Final     RDW   Date Value Ref Range Status   12/04/2023 12.9 12.3 - 15.4 % Final   02/21/2023 13.2 11.5 - 14.5 % Final     RDW-SD   Date Value Ref Range Status   12/04/2023 45.9 37.0 - 54.0 fl Final     MPV   Date Value Ref Range Status   12/04/2023 10.3 6.0 - 12.0 fL Final   02/21/2023 10.4 9.4 - 12.3 fL Final     Platelets   Date Value Ref Range Status   12/04/2023 349 140 - 450 10*3/mm3 Final   02/21/2023 331 130 - 400 K/uL Final     Neutrophil Rel %   Date Value Ref Range Status   02/21/2023 67.7 (H) 50.0 - 65.0 % Final     Neutrophil %   Date Value Ref Range Status   12/04/2023 69.4 42.7 - 76.0 % Final     Lymphocyte Rel %   Date Value Ref Range Status   02/21/2023 26.2 20.0 - 40.0 % Final     Lymphocyte %   Date Value Ref Range Status   12/04/2023 24.5 19.6 - 45.3 % Final     Monocyte Rel %   Date Value Ref Range Status   02/21/2023 5.4 0.0 - 10.0 % Final     Monocyte %   Date Value Ref Range Status   12/04/2023 4.9 (L) 5.0 - 12.0 % Final     Eosinophil Rel %   Date Value Ref Range Status   02/21/2023 0.1 0.0 - 5.0 % Final     Eosinophil %   Date Value Ref Range Status   12/04/2023 0.4 0.3 - 6.2 % Final     Basophil Rel %   Date Value Ref Range Status   02/21/2023 0.4 0.0 - 1.0 % Final     Basophil %   Date Value Ref Range Status   12/04/2023 0.5 0.0 - 1.5 % Final     Immature Grans %   Date Value Ref Range Status   12/04/2023 0.3 0.0 - 0.5 % Final     Neutrophils Absolute  "  Date Value Ref Range Status   02/21/2023 7.0 1.5 - 7.5 K/uL Final     Neutrophils, Absolute   Date Value Ref Range Status   12/04/2023 7.69 (H) 1.70 - 7.00 10*3/mm3 Final     Lymphocytes Absolute   Date Value Ref Range Status   02/21/2023 2.7 1.1 - 4.5 K/uL Final     Lymphocytes, Absolute   Date Value Ref Range Status   12/04/2023 2.71 0.70 - 3.10 10*3/mm3 Final     Monocytes Absolute   Date Value Ref Range Status   02/21/2023 0.60 0.00 - 0.90 K/uL Final     Monocytes, Absolute   Date Value Ref Range Status   12/04/2023 0.54 0.10 - 0.90 10*3/mm3 Final     Eosinophils Absolute   Date Value Ref Range Status   02/21/2023 0.00 0.00 - 0.60 K/uL Final     Eosinophils, Absolute   Date Value Ref Range Status   12/04/2023 0.04 0.00 - 0.40 10*3/mm3 Final     Basophils Absolute   Date Value Ref Range Status   02/21/2023 0.00 0.00 - 0.20 K/uL Final     Basophils, Absolute   Date Value Ref Range Status   12/04/2023 0.05 0.00 - 0.20 10*3/mm3 Final     Immature Grans, Absolute   Date Value Ref Range Status   12/04/2023 0.03 0.00 - 0.05 10*3/mm3 Final   02/21/2023 0.0 K/uL Final     nRBC   Date Value Ref Range Status   12/04/2023 0.0 0.0 - 0.2 /100 WBC Final         OBJECTIVE:  Visit Vitals  /80 (BP Location: Left arm, Patient Position: Sitting, Cuff Size: Adult)   Pulse 79   Temp 97.2 °F (36.2 °C) (Temporal)   Ht 167.6 cm (66\")   Wt 84.9 kg (187 lb 1.6 oz)   LMP 05/15/2024 (Approximate)   SpO2 97%   BMI 30.20 kg/m²      Physical Exam  Vitals and nursing note reviewed.   Constitutional:       General: She is not in acute distress.     Appearance: Normal appearance. She is well-developed. She is not diaphoretic.   HENT:      Head: Normocephalic and atraumatic.   Eyes:      Pupils: Pupils are equal, round, and reactive to light.   Neck:      Thyroid: No thyromegaly.      Trachea: Phonation normal.   Cardiovascular:      Rate and Rhythm: Normal rate and regular rhythm.   Pulmonary:      Effort: No respiratory distress. "   Musculoskeletal:      Left shoulder: Tenderness present. No crepitus. Normal range of motion. Normal strength.        Arms:    Skin:     Coloration: Skin is not pale.      Findings: No erythema.   Neurological:      Mental Status: She is alert.   Psychiatric:         Behavior: Behavior normal.         Thought Content: Thought content normal.         Judgment: Judgment normal.         Results  Imaging   Cervical neck x-ray showed some flattened cervical lordosis.    Assessment/Plan      Diagnoses and all orders for this visit:    1. Chronic left shoulder pain (Primary)  -     Ambulatory Referral to Orthopedic Surgery    2. Fibromyalgia    3. Anxiety    4. Muscle spasms of neck    5. Healthcare maintenance  -     CBC (No Diff); Future  -     Comprehensive Metabolic Panel; Future  -     Hemoglobin A1c; Future  -     Hepatitis C Antibody; Future  -     Lipid Panel; Future    6. Encounter for hepatitis C screening test for low risk patient  -     Hepatitis C Antibody; Future    7. Encounter for screening mammogram for malignant neoplasm of breast  -     Mammo Screening Digital Tomosynthesis Bilateral With CAD; Future    8. Colon cancer screening  -     Ambulatory Referral to Gastroenterology    9. High risk medication use  -     Urine Drug Screen - Urine, Clean Catch; Future      Assessment & Plan  Pain in the left shoulder.  A referral to an orthopedic specialist will be made.  Recommend a course of physical therapy but she does not believe her insurance will cover the cost.  Continue over-the-counter acetaminophen to help with symptoms.    Her fibromyalgia is not controlled with her current regimen.  She gets better relief with gabapentin.  Will check urine drug screen, and if negative I can prescribe gabapentin.  Consent and agreement forms have been completed and will be scanned into her chart.     Health maintenance.   Blood work was ordered. A mammogram will be ordered. The GI team will contact the patient to  arrange a visit for colorectal cancer screening    Continue seeing psychiatry as scheduled.  She is on Lamictal and hydroxyzine per psychiatry.    She also takes Zanaflex to help with her fibromyalgia.     Follow-up  The patient is scheduled for a follow-up visit in 6 months.    Return in about 6 months (around 12/18/2024) for Annual physical.      GILLIAN Nichole MD  13:25 CDT  6/18/2024   Electronically signed      Patient or patient representative verbalized consent for the use of Ambient Listening during the visit with  DARIO Nichole MD for chart documentation. 6/18/2024  14:03 CDT

## 2024-06-19 DIAGNOSIS — M79.7 FIBROMYALGIA: Primary | ICD-10-CM

## 2024-06-19 LAB — HCV AB SER QL: NORMAL

## 2024-06-19 RX ORDER — GABAPENTIN 300 MG/1
300 CAPSULE ORAL 2 TIMES DAILY
Qty: 60 CAPSULE | Refills: 5 | Status: SHIPPED | OUTPATIENT
Start: 2024-06-19

## 2024-06-20 ENCOUNTER — PATIENT ROUNDING (BHMG ONLY) (OUTPATIENT)
Dept: INTERNAL MEDICINE | Facility: CLINIC | Age: 44
End: 2024-06-20
Payer: COMMERCIAL

## 2024-06-20 NOTE — PROGRESS NOTES
June 20, 2024    Hello, may I speak with Zena Zheng?    My name is ANSLEY JAIMES      I am  with [facility]  [address], [address]  [place] [address] [phone].    Before we get started may I verify your date of birth? 1980    I am calling to officially welcome you to our practice and ask about your recent visit. Is this a good time to talk? yes    Tell me about your visit with us. What things went well?  IT WAS GOOD!       We're always looking for ways to make our patients' experiences even better. Do you have recommendations on ways we may improve?  no    Overall were you satisfied with your first visit to our practice? yes       I appreciate you taking the time to speak with me today. Is there anything else I can do for you? no      Thank you, and have a great day.

## 2024-06-23 LAB
NCCN CRITERIA FLAG: NORMAL
TYRER CUZICK SCORE: 7.2

## 2024-06-25 ENCOUNTER — HOSPITAL ENCOUNTER (OUTPATIENT)
Dept: MAMMOGRAPHY | Facility: HOSPITAL | Age: 44
Discharge: HOME | End: 2024-06-25
Admitting: INTERNAL MEDICINE
Payer: COMMERCIAL

## 2024-06-25 DIAGNOSIS — Z12.31 ENCOUNTER FOR SCREENING MAMMOGRAM FOR MALIGNANT NEOPLASM OF BREAST: ICD-10-CM

## 2024-06-25 DIAGNOSIS — R92.8 ABNORMAL MAMMOGRAM: Primary | ICD-10-CM

## 2024-06-25 PROCEDURE — 77063 BREAST TOMOSYNTHESIS BI: CPT

## 2024-06-25 PROCEDURE — 77067 SCR MAMMO BI INCL CAD: CPT

## 2024-06-28 ENCOUNTER — ANCILLARY ORDERS (OUTPATIENT)
Dept: MAMMOGRAPHY | Facility: HOSPITAL | Age: 44
End: 2024-06-28
Payer: COMMERCIAL

## 2024-06-28 ENCOUNTER — HOSPITAL ENCOUNTER (OUTPATIENT)
Dept: ULTRASOUND IMAGING | Facility: HOSPITAL | Age: 44
Discharge: HOME | End: 2024-06-28
Payer: COMMERCIAL

## 2024-06-28 ENCOUNTER — PATIENT ROUNDING (BHMG ONLY) (OUTPATIENT)
Dept: URGENT CARE | Facility: CLINIC | Age: 44
End: 2024-06-28
Payer: COMMERCIAL

## 2024-06-28 ENCOUNTER — HOSPITAL ENCOUNTER (OUTPATIENT)
Dept: MAMMOGRAPHY | Facility: HOSPITAL | Age: 44
Discharge: HOME | End: 2024-06-28
Payer: COMMERCIAL

## 2024-06-28 DIAGNOSIS — R92.8 ABNORMAL MAMMOGRAM: Primary | ICD-10-CM

## 2024-06-28 DIAGNOSIS — R92.8 ABNORMAL ULTRASOUND OF BREAST: ICD-10-CM

## 2024-06-28 DIAGNOSIS — N60.02 BREAST CYST, LEFT: ICD-10-CM

## 2024-06-28 DIAGNOSIS — R92.8 ABNORMAL MAMMOGRAM: ICD-10-CM

## 2024-06-28 PROCEDURE — 76642 ULTRASOUND BREAST LIMITED: CPT

## 2024-06-28 PROCEDURE — 77066 DX MAMMO INCL CAD BI: CPT

## 2024-06-28 PROCEDURE — G0279 TOMOSYNTHESIS, MAMMO: HCPCS

## 2024-06-28 NOTE — ED NOTES
Thank you for letting us care for you in your recent visit to our urgent care center. We would love to hear about your experience with us. Was this the first time you have visited our location?    We’re always looking for ways to make our patients’ experiences even better. Do you have any recommendations on ways we may improve?     I appreciate you taking the time to respond. Please be on the lookout for a survey about your recent visit from [employer] via text or email. We would greatly appreciate if you could fill that out and turn it back in. We want your voice to be heard and we value your feedback.   Thank you for choosing [facility] for your healthcare needs.

## 2024-07-08 ENCOUNTER — TELEPHONE (OUTPATIENT)
Dept: INTERNAL MEDICINE | Facility: CLINIC | Age: 44
End: 2024-07-08
Payer: COMMERCIAL

## 2024-07-08 NOTE — TELEPHONE ENCOUNTER
Called. No answer. LMOM    A1C was just completed on 6/18. It was 5.6 That's normal range. Please find out if pt is having any symptoms.

## 2024-07-08 NOTE — TELEPHONE ENCOUNTER
Caller: Zena Zheng    Relationship: Self    Best call back number: [phone]     What is the best time to reach you: ANYTIME    Who are you requesting to speak with (clinical staff, provider,  specific staff member): CLINICAL    What was the call regarding: PATIENT IS CONCERN ABOUT BLOOD SUGAR LEVELS WOULD LIKE A CALL BACK ABOUT IF SHE NEEDS TO BE TESTED AND PIT ON MEDICATIONS.

## 2024-07-15 ENCOUNTER — HOSPITAL ENCOUNTER (EMERGENCY)
Facility: HOSPITAL | Age: 44
Discharge: HOME | End: 2024-07-15
Attending: EMERGENCY MEDICINE
Payer: COMMERCIAL

## 2024-07-15 ENCOUNTER — TELEPHONE (OUTPATIENT)
Dept: OBGYN CLINIC | Age: 44
End: 2024-07-15

## 2024-07-15 ENCOUNTER — APPOINTMENT (OUTPATIENT)
Dept: CT IMAGING | Facility: HOSPITAL | Age: 44
End: 2024-07-15
Payer: COMMERCIAL

## 2024-07-15 VITALS
BODY MASS INDEX: 29.57 KG/M2 | TEMPERATURE: 97.9 F | OXYGEN SATURATION: 99 % | RESPIRATION RATE: 16 BRPM | HEART RATE: 62 BPM | SYSTOLIC BLOOD PRESSURE: 114 MMHG | WEIGHT: 184 LBS | HEIGHT: 66 IN | DIASTOLIC BLOOD PRESSURE: 74 MMHG

## 2024-07-15 DIAGNOSIS — R10.9 ABDOMINAL PAIN, UNSPECIFIED ABDOMINAL LOCATION: Primary | ICD-10-CM

## 2024-07-15 LAB
ALBUMIN SERPL-MCNC: 4.1 G/DL (ref 3.5–5.2)
ALBUMIN/GLOB SERPL: 1.6 G/DL
ALP SERPL-CCNC: 60 U/L (ref 39–117)
ALT SERPL W P-5'-P-CCNC: 11 U/L (ref 1–33)
ANION GAP SERPL CALCULATED.3IONS-SCNC: 7 MMOL/L (ref 5–15)
AST SERPL-CCNC: 14 U/L (ref 1–32)
B-HCG UR QL: NEGATIVE
BASOPHILS # BLD AUTO: 0.04 10*3/MM3 (ref 0–0.2)
BASOPHILS NFR BLD AUTO: 0.5 % (ref 0–1.5)
BILIRUB SERPL-MCNC: 0.7 MG/DL (ref 0–1.2)
BILIRUB UR QL STRIP: NEGATIVE
BUN SERPL-MCNC: 13 MG/DL (ref 6–20)
BUN/CREAT SERPL: 14.1 (ref 7–25)
CALCIUM SPEC-SCNC: 9.7 MG/DL (ref 8.6–10.5)
CHLORIDE SERPL-SCNC: 104 MMOL/L (ref 98–107)
CLARITY UR: CLEAR
CO2 SERPL-SCNC: 27 MMOL/L (ref 22–29)
COLOR UR: YELLOW
CREAT SERPL-MCNC: 0.92 MG/DL (ref 0.57–1)
DEPRECATED RDW RBC AUTO: 44.7 FL (ref 37–54)
EGFRCR SERPLBLD CKD-EPI 2021: 78.4 ML/MIN/1.73
EOSINOPHIL # BLD AUTO: 0.06 10*3/MM3 (ref 0–0.4)
EOSINOPHIL NFR BLD AUTO: 0.8 % (ref 0.3–6.2)
ERYTHROCYTE [DISTWIDTH] IN BLOOD BY AUTOMATED COUNT: 13.6 % (ref 12.3–15.4)
GLOBULIN UR ELPH-MCNC: 2.6 GM/DL
GLUCOSE SERPL-MCNC: 91 MG/DL (ref 65–99)
GLUCOSE UR STRIP-MCNC: NEGATIVE MG/DL
HCT VFR BLD AUTO: 39.6 % (ref 34–46.6)
HGB BLD-MCNC: 12.9 G/DL (ref 12–15.9)
HGB UR QL STRIP.AUTO: NEGATIVE
IMM GRANULOCYTES # BLD AUTO: 0.02 10*3/MM3 (ref 0–0.05)
IMM GRANULOCYTES NFR BLD AUTO: 0.3 % (ref 0–0.5)
KETONES UR QL STRIP: NEGATIVE
LEUKOCYTE ESTERASE UR QL STRIP.AUTO: NEGATIVE
LIPASE SERPL-CCNC: 19 U/L (ref 13–60)
LYMPHOCYTES # BLD AUTO: 2.76 10*3/MM3 (ref 0.7–3.1)
LYMPHOCYTES NFR BLD AUTO: 34.9 % (ref 19.6–45.3)
MCH RBC QN AUTO: 29.3 PG (ref 26.6–33)
MCHC RBC AUTO-ENTMCNC: 32.6 G/DL (ref 31.5–35.7)
MCV RBC AUTO: 89.8 FL (ref 79–97)
MONOCYTES # BLD AUTO: 0.59 10*3/MM3 (ref 0.1–0.9)
MONOCYTES NFR BLD AUTO: 7.5 % (ref 5–12)
NEUTROPHILS NFR BLD AUTO: 4.43 10*3/MM3 (ref 1.7–7)
NEUTROPHILS NFR BLD AUTO: 56 % (ref 42.7–76)
NITRITE UR QL STRIP: NEGATIVE
NRBC BLD AUTO-RTO: 0 /100 WBC (ref 0–0.2)
PH UR STRIP.AUTO: 7 [PH] (ref 5–8)
PLATELET # BLD AUTO: 351 10*3/MM3 (ref 140–450)
PMV BLD AUTO: 9.9 FL (ref 6–12)
POTASSIUM SERPL-SCNC: 3.6 MMOL/L (ref 3.5–5.2)
PROT SERPL-MCNC: 6.7 G/DL (ref 6–8.5)
PROT UR QL STRIP: NEGATIVE
RBC # BLD AUTO: 4.41 10*6/MM3 (ref 3.77–5.28)
SODIUM SERPL-SCNC: 138 MMOL/L (ref 136–145)
SP GR UR STRIP: <=1.005 (ref 1–1.03)
UROBILINOGEN UR QL STRIP: NORMAL
WBC NRBC COR # BLD AUTO: 7.9 10*3/MM3 (ref 3.4–10.8)

## 2024-07-15 PROCEDURE — 85025 COMPLETE CBC W/AUTO DIFF WBC: CPT | Performed by: EMERGENCY MEDICINE

## 2024-07-15 PROCEDURE — 99285 EMERGENCY DEPT VISIT HI MDM: CPT

## 2024-07-15 PROCEDURE — 25510000001 IOPAMIDOL 61 % SOLUTION: Performed by: EMERGENCY MEDICINE

## 2024-07-15 PROCEDURE — 81003 URINALYSIS AUTO W/O SCOPE: CPT | Performed by: EMERGENCY MEDICINE

## 2024-07-15 PROCEDURE — 74177 CT ABD & PELVIS W/CONTRAST: CPT

## 2024-07-15 PROCEDURE — 83690 ASSAY OF LIPASE: CPT | Performed by: EMERGENCY MEDICINE

## 2024-07-15 PROCEDURE — 80053 COMPREHEN METABOLIC PANEL: CPT | Performed by: EMERGENCY MEDICINE

## 2024-07-15 PROCEDURE — 81025 URINE PREGNANCY TEST: CPT | Performed by: EMERGENCY MEDICINE

## 2024-07-15 RX ORDER — SODIUM CHLORIDE 0.9 % (FLUSH) 0.9 %
10 SYRINGE (ML) INJECTION AS NEEDED
Status: DISCONTINUED | OUTPATIENT
Start: 2024-07-15 | End: 2024-07-15 | Stop reason: HOSPADM

## 2024-07-15 RX ADMIN — IOPAMIDOL 100 ML: 612 INJECTION, SOLUTION INTRAVENOUS at 07:00

## 2024-07-15 NOTE — Clinical Note
[facility] EMERGENCY DEPARTMENT  [address]  [facility] [address]  Phone: [phone]    Zena Zheng was seen and treated in our emergency department on 7/15/2024.  She may return to work on 07/16/2024.         Thank you for choosing [facility].    Andrade Agosto II, RN

## 2024-07-15 NOTE — ED PROVIDER NOTES
Subjective   History of Present Illness    Review of Systems    Past Medical History:   Diagnosis Date    Anxiety     BV (bacterial vaginosis)     Depression        Allergies   Allergen Reactions    Doxycycline Hives and Itching    Erythromycin Hives and Itching    Sulfa Antibiotics Hives and Itching    Amoxicillin Rash    Tape Rash       Past Surgical History:   Procedure Laterality Date    ANKLE SURGERY      ENDOSCOPY N/A 12/04/2023    No endoscopic esophageal abnormality to explain patient's dysphagia. Esophagus dilated. Dilated. - There is no endoscopic evidence of Ingram's esophagus. - Normal stomach. - Normal examined duodenum. - No specimens collecte    TUBAL ABDOMINAL LIGATION         Family History   Problem Relation Age of Onset    Colon polyps Mother     Arthritis Mother     Heart disease Mother     Stroke Father     No Known Problems Sister     No Known Problems Brother     No Known Problems Daughter     No Known Problems Son     No Known Problems Maternal Aunt     No Known Problems Paternal Aunt     No Known Problems Maternal Grandmother     No Known Problems Paternal Grandmother     BRCA 1/2 Neg Hx     Breast cancer Neg Hx     Colon cancer Neg Hx     Endometrial cancer Neg Hx     Ovarian cancer Neg Hx        Social History     Socioeconomic History    Marital status:    Tobacco Use    Smoking status: Never    Smokeless tobacco: Never   Vaping Use    Vaping status: Never Used   Substance and Sexual Activity    Alcohol use: Not Currently    Drug use: Never    Sexual activity: Yes     Partners: Male     Birth control/protection: Surgical           Objective   Physical Exam    Procedures           ED Course  ED Course as of 07/15/24 0734   Mon Jul 15, 2024   0732 Patient was seen by Dr. Santos please refer to his records for further details the patient given abdominal pain her workup essentially is negative and CT scan is negative.  I have discussed this case at length with the patient the  patient states she been hurting in her abdomen for the past 3 to 4 months episodically.  She will have follow-up with her primary MD for reevaluation reassessment. [TS]      ED Course User Index  [TS] Glen Tinoco MD                                             Medical Decision Making  Problems Addressed:  Abdominal pain, unspecified abdominal location: chronic illness or injury    Amount and/or Complexity of Data Reviewed  Labs: ordered.  Radiology: ordered.    Risk  Prescription drug management.  Risk Details: This patient presents with abdominal pain arrived hemodynamically stable.  Presentation not consistent with other acute, emergent causes of abdominal pain at this time.  Low suspicion for dissection there is no widened mediastinum, hypotension, pulses deficits, and no pain tearing through to the back.  Low suspicion for nephrolithiasis without flank pain radiating to the groin, hematuria, or any history of kidney stones.  Low suspicion for viscus perforation without evidence of guarding, rebound, or rigidity that would be concerning for an acute abdomen.  Low concern for appendicitis as pain did not radiate from the umbilicus to the right lower quadrant, negative Rovsing's sign, no severe constipation on exam.  Low concern for cholecystitis with negative Huffman sign, no history of gallstones, and no recent pale stools or pain after eating.  Low concern for ulcerative disease as pain is not consistent with eating  foods and is not relieved with patient refraining from eatingand there is no hematemesis or melena. Low suspicion for GI bleeding as there is no melena hematemesis or hematochezia and patient's hemodynamics are stable and hemoglobin is at its baseline.  Low suspicion for gastroenteritis without evidence of diarrhea, fevers, or recent uncooked food exposure.  There is low concern for ischemic bowel with no significant abdominal pain normal vitals and no acidosis no history of peripheral vascular  disease or cardiac dysrhythmias.                Final diagnoses:   Abdominal pain, unspecified abdominal location       ED Disposition  ED Disposition       ED Disposition   Discharge    Condition   Stable    Comment   --               DARIO Nichole MD  [address] [address]  [address] [place], [place] [id number] [phone]               Medication List      No changes were made to your prescriptions during this visit.            Glen Tinoco MD  07/15/24 3488

## 2024-07-15 NOTE — TELEPHONE ENCOUNTER
Pt called requesting an appt for left side ovary pain, pt stated she had a scan and was told to follow up with obgyn. Made appt for pt.

## 2024-07-15 NOTE — ED PROVIDER NOTES
Subjective   History of Present Illness  Pt presents to the  with report of LLQ abdominal pain - reports she has had this for some time but it has been worse for the past couple of days.  No n/v/f/c.  No injuries.  Denies any dysuria/hematuria.  Also c/o having a rash to her face.  She reports that she is followed by dermatology for this and has a steroid cream that they give her, but that her rash continues to return.  She has also been taking benadryl.  No new exposures.  No SOB/CP        Review of Systems   Constitutional:  Negative for chills and fever.   Eyes:  Positive for itching.   Respiratory:  Negative for cough and shortness of breath.    Cardiovascular:  Negative for chest pain.   Gastrointestinal:  Positive for abdominal pain. Negative for diarrhea, nausea and vomiting.   Genitourinary:  Negative for dysuria and hematuria.   Skin:  Positive for rash.   All other systems reviewed and are negative.      Past Medical History:   Diagnosis Date    Anxiety     BV (bacterial vaginosis)     Depression        Allergies   Allergen Reactions    Doxycycline Hives and Itching    Erythromycin Hives and Itching    Sulfa Antibiotics Hives and Itching    Amoxicillin Rash    Tape Rash       Past Surgical History:   Procedure Laterality Date    ANKLE SURGERY      ENDOSCOPY N/A 12/04/2023    No endoscopic esophageal abnormality to explain patient's dysphagia. Esophagus dilated. Dilated. - There is no endoscopic evidence of Ingram's esophagus. - Normal stomach. - Normal examined duodenum. - No specimens collecte    TUBAL ABDOMINAL LIGATION         Family History   Problem Relation Age of Onset    Colon polyps Mother     Arthritis Mother     Heart disease Mother     Stroke Father     No Known Problems Sister     No Known Problems Brother     No Known Problems Daughter     No Known Problems Son     No Known Problems Maternal Aunt     No Known Problems Paternal Aunt     No Known Problems Maternal Grandmother     No Known  Problems Paternal Grandmother     BRCA 1/2 Neg Hx     Breast cancer Neg Hx     Colon cancer Neg Hx     Endometrial cancer Neg Hx     Ovarian cancer Neg Hx        Social History     Socioeconomic History    Marital status:    Tobacco Use    Smoking status: Never    Smokeless tobacco: Never   Vaping Use    Vaping status: Never Used   Substance and Sexual Activity    Alcohol use: No    Drug use: Never    Sexual activity: Yes     Partners: Male     Birth control/protection: Surgical           Objective   Physical Exam  Vitals and nursing note reviewed.   Constitutional:       General: She is not in acute distress.  HENT:      Head: Normocephalic.      Mouth/Throat:      Mouth: Mucous membranes are moist.   Eyes:      General: No scleral icterus.     Comments: Conj. normal   Cardiovascular:      Rate and Rhythm: Normal rate and regular rhythm.      Heart sounds: Normal heart sounds.   Pulmonary:      Effort: Pulmonary effort is normal.      Breath sounds: Normal breath sounds.   Abdominal:      General: Abdomen is flat. Bowel sounds are normal.      Palpations: Abdomen is soft.      Tenderness: There is no abdominal tenderness.   Skin:     General: Skin is warm and dry.      Capillary Refill: Capillary refill takes less than 2 seconds.      Findings: No rash.   Neurological:      Mental Status: She is alert.         Procedures           ED Course  ED Course as of 07/15/24 1840   Mon Jul 15, 2024   0732 Patient was seen by Dr. Santos please refer to his records for further details the patient given abdominal pain her workup essentially is negative and CT scan is negative.  I have discussed this case at length with the patient the patient states she been hurting in her abdomen for the past 3 to 4 months episodically.  She will have follow-up with her primary MD for reevaluation reassessment. [TS]      ED Course User Index  [TS] Glen Tinoco MD                                             Medical Decision  Making  Problems Addressed:  Abdominal pain, unspecified abdominal location: complicated acute illness or injury    Amount and/or Complexity of Data Reviewed  Labs: ordered.  Radiology: ordered.    Risk  Prescription drug management.        Final diagnoses:   None       ED Disposition  ED Disposition       None            No follow-up provider specified.       Medication List      No changes were made to your prescriptions during this visit.            Juanito Santos,   07/15/24 0613       Juanito Santos,   07/15/24 0107

## 2024-07-18 ENCOUNTER — OFFICE VISIT (OUTPATIENT)
Dept: INTERNAL MEDICINE | Facility: CLINIC | Age: 44
End: 2024-07-18
Payer: COMMERCIAL

## 2024-07-18 VITALS
WEIGHT: 186 LBS | HEART RATE: 68 BPM | HEIGHT: 66 IN | DIASTOLIC BLOOD PRESSURE: 68 MMHG | SYSTOLIC BLOOD PRESSURE: 106 MMHG | OXYGEN SATURATION: 99 % | BODY MASS INDEX: 29.89 KG/M2

## 2024-07-18 DIAGNOSIS — H65.93 FLUID LEVEL BEHIND TYMPANIC MEMBRANE OF BOTH EARS: ICD-10-CM

## 2024-07-18 DIAGNOSIS — R21 RASH OF FACE: ICD-10-CM

## 2024-07-18 DIAGNOSIS — R73.01 ELEVATED FASTING GLUCOSE: Primary | ICD-10-CM

## 2024-07-18 PROCEDURE — 99214 OFFICE O/P EST MOD 30 MIN: CPT

## 2024-07-18 RX ORDER — FLUTICASONE PROPIONATE 50 MCG
1 SPRAY, SUSPENSION (ML) NASAL DAILY
Qty: 15.8 ML | Refills: 11 | Status: SHIPPED | OUTPATIENT
Start: 2024-07-18

## 2024-07-18 NOTE — LETTER
July 18, 2024     Patient: Zena Zheng   YOB: 1980   Date of Visit: 7/18/2024       To Whom It May Concern:     Zena Zheng had an appointment in our office on 07/18/2024 at 1.00pm. Please excuse her from work for this appointment.            Sincerely,        AARON Hummel    CC: No Recipients

## 2024-07-18 NOTE — PROGRESS NOTES
Chief Complaint   Patient presents with    Blood Sugar Problem     Pt states fasting glucose has been running high    Rash     Comes and goes       History:      Zena Zheng is a 44 y.o. female who presents today for evaluation of the above problems.      Blood Sugar Problem  Associated symptoms include congestion and a rash. Pertinent negatives include no chest pain, fatigue, fever or headaches.   Rash  Associated symptoms include congestion. Pertinent negatives include no fatigue, fever or shortness of breath.     Ms. severino presents today to discuss elevated blood sugar readings and a rash on her face.    She reports that she was recently evaluated at the ophthalmologist due to changes in her vision, he instructed her that these changes could be related to diabetes or elevated blood sugar readings.  Following that appointment she began monitoring blood sugar and reports that fasting levels have been ranging from 106-107.  She did report 1 episode of blood sugar being 218 after eating ice cream.  Hemoglobin A1c drawn on 6/18/2024 was 5.6.    She also reports that she has a rash that started on her forehead on 7/5/2024.  The rash initially started with a few small bumps accompanied with itching, since then the rash has continued spread across her entire face, as well as a few spots along her chest.  She also has a current prescription for Elidel that she has been using once daily for several months. She has been treating the rash with Benadryl to reduce symptoms.  She went to dermatology on 6/26/2024 where she was prescribed a topical clindamycin for treatment of dermatitis.      ROS:  Review of Systems   Constitutional:  Negative for activity change, fatigue and fever.   HENT:  Positive for congestion.    Respiratory:  Negative for chest tightness and shortness of breath.    Cardiovascular:  Negative for chest pain and palpitations.   Genitourinary:  Negative for difficulty urinating.   Skin:  Positive for  rash.   Neurological:  Positive for dizziness. Negative for headaches.         Current Outpatient Medications:     Azelastine HCl 137 MCG/SPRAY solution, SPRAY 1 TO 2 SPRAYS INTO EACH NOSTRIL TWICE A DAY AS NEEDED, Disp: , Rfl:     clindamycin 1 % gel, , Disp: , Rfl:     fluticasone (FLONASE) 50 MCG/ACT nasal spray, 1 spray by Each Nare route Daily., Disp: 15.8 mL, Rfl: 11    gabapentin (NEURONTIN) 300 MG capsule, Take 1 capsule by mouth 2 (Two) Times a Day., Disp: 60 capsule, Rfl: 5    hydrOXYzine pamoate (VISTARIL) 25 MG capsule, , Disp: , Rfl:     lamoTRIgine (LaMICtal) 25 MG tablet, 2 TABLETS NIGHTLY, Disp: , Rfl:     loratadine (Claritin) 10 MG tablet, Take 1 tablet by mouth Daily., Disp: , Rfl:     metoprolol succinate XL (TOPROL-XL) 25 MG 24 hr tablet, Take 1 tablet by mouth Daily., Disp: , Rfl:     ondansetron (ZOFRAN) 4 MG tablet, Every 8 (Eight) Hours., Disp: , Rfl:     pimecrolimus (ELIDEL) 1 % cream, APPLY TO FULL FACE EVERY NIGHT, Disp: , Rfl:     tiZANidine (ZANAFLEX) 4 MG tablet, Take 1 tablet by mouth 3 (Three) Times a Day., Disp: , Rfl:     trimethoprim-polymyxin b (POLYTRIM) 04399-6.1 UNIT/ML-% ophthalmic solution, Administer 1 drop to both eyes Every 4 (Four) Hours for 5 days., Disp: 10 mL, Rfl: 0    Lab Results   Component Value Date    GLUCOSE 91 07/15/2024    BUN 13 07/15/2024    CREATININE 0.92 07/15/2024    EGFR 78.4 07/15/2024    BCR 14.1 07/15/2024    K 3.6 07/15/2024    CO2 27.0 07/15/2024    CALCIUM 9.7 07/15/2024    ALBUMIN 4.1 07/15/2024    BILITOT 0.7 07/15/2024    AST 14 07/15/2024    ALT 11 07/15/2024       WBC   Date Value Ref Range Status   07/15/2024 7.90 3.40 - 10.80 10*3/mm3 Final   02/21/2023 10.4 4.8 - 10.8 K/uL Final     RBC   Date Value Ref Range Status   07/15/2024 4.41 3.77 - 5.28 10*6/mm3 Final   02/21/2023 4.47 4.20 - 5.40 M/uL Final     Hemoglobin   Date Value Ref Range Status   07/15/2024 12.9 12.0 - 15.9 g/dL Final   02/21/2023 13.5 12.0 - 16.0 g/dL Final      Hematocrit   Date Value Ref Range Status   07/15/2024 39.6 34.0 - 46.6 % Final   02/21/2023 41.9 37.0 - 47.0 % Final     MCV   Date Value Ref Range Status   07/15/2024 89.8 79.0 - 97.0 fL Final   02/21/2023 93.7 81.0 - 99.0 fL Final     MCH   Date Value Ref Range Status   07/15/2024 29.3 26.6 - 33.0 pg Final   02/21/2023 30.2 27.0 - 31.0 pg Final     MCHC   Date Value Ref Range Status   07/15/2024 32.6 31.5 - 35.7 g/dL Final   02/21/2023 32.2 (L) 33.0 - 37.0 g/dL Final     RDW   Date Value Ref Range Status   07/15/2024 13.6 12.3 - 15.4 % Final   02/21/2023 13.2 11.5 - 14.5 % Final     RDW-SD   Date Value Ref Range Status   07/15/2024 44.7 37.0 - 54.0 fl Final     MPV   Date Value Ref Range Status   07/15/2024 9.9 6.0 - 12.0 fL Final   02/21/2023 10.4 9.4 - 12.3 fL Final     Platelets   Date Value Ref Range Status   07/15/2024 351 140 - 450 10*3/mm3 Final   02/21/2023 331 130 - 400 K/uL Final     Neutrophil Rel %   Date Value Ref Range Status   02/21/2023 67.7 (H) 50.0 - 65.0 % Final     Neutrophil %   Date Value Ref Range Status   07/15/2024 56.0 42.7 - 76.0 % Final     Lymphocyte Rel %   Date Value Ref Range Status   02/21/2023 26.2 20.0 - 40.0 % Final     Lymphocyte %   Date Value Ref Range Status   07/15/2024 34.9 19.6 - 45.3 % Final     Monocyte Rel %   Date Value Ref Range Status   02/21/2023 5.4 0.0 - 10.0 % Final     Monocyte %   Date Value Ref Range Status   07/15/2024 7.5 5.0 - 12.0 % Final     Eosinophil Rel %   Date Value Ref Range Status   02/21/2023 0.1 0.0 - 5.0 % Final     Eosinophil %   Date Value Ref Range Status   07/15/2024 0.8 0.3 - 6.2 % Final     Basophil Rel %   Date Value Ref Range Status   02/21/2023 0.4 0.0 - 1.0 % Final     Basophil %   Date Value Ref Range Status   07/15/2024 0.5 0.0 - 1.5 % Final     Immature Grans %   Date Value Ref Range Status   07/15/2024 0.3 0.0 - 0.5 % Final     Neutrophils Absolute   Date Value Ref Range Status   02/21/2023 7.0 1.5 - 7.5 K/uL Final  "    Neutrophils, Absolute   Date Value Ref Range Status   07/15/2024 4.43 1.70 - 7.00 10*3/mm3 Final     Lymphocytes Absolute   Date Value Ref Range Status   02/21/2023 2.7 1.1 - 4.5 K/uL Final     Lymphocytes, Absolute   Date Value Ref Range Status   07/15/2024 2.76 0.70 - 3.10 10*3/mm3 Final     Monocytes Absolute   Date Value Ref Range Status   02/21/2023 0.60 0.00 - 0.90 K/uL Final     Monocytes, Absolute   Date Value Ref Range Status   07/15/2024 0.59 0.10 - 0.90 10*3/mm3 Final     Eosinophils Absolute   Date Value Ref Range Status   02/21/2023 0.00 0.00 - 0.60 K/uL Final     Eosinophils, Absolute   Date Value Ref Range Status   07/15/2024 0.06 0.00 - 0.40 10*3/mm3 Final     Basophils Absolute   Date Value Ref Range Status   02/21/2023 0.00 0.00 - 0.20 K/uL Final     Basophils, Absolute   Date Value Ref Range Status   07/15/2024 0.04 0.00 - 0.20 10*3/mm3 Final     Immature Grans, Absolute   Date Value Ref Range Status   07/15/2024 0.02 0.00 - 0.05 10*3/mm3 Final   02/21/2023 0.0 K/uL Final     nRBC   Date Value Ref Range Status   07/15/2024 0.0 0.0 - 0.2 /100 WBC Final       OBJECTIVE:  Visit Vitals  /68 (BP Location: Right arm, Patient Position: Sitting, Cuff Size: Adult)   Pulse 68   Ht 167.6 cm (66\")   Wt 84.4 kg (186 lb)   LMP 07/12/2024 (Exact Date)   SpO2 99%   BMI 30.02 kg/m²      Physical Exam  Constitutional:       Appearance: Normal appearance.   HENT:      Right Ear: A middle ear effusion is present.      Left Ear: A middle ear effusion is present.   Eyes:      Pupils: Pupils are equal, round, and reactive to light.   Cardiovascular:      Rate and Rhythm: Normal rate and regular rhythm.      Pulses: Normal pulses.      Heart sounds: Normal heart sounds.   Pulmonary:      Effort: Pulmonary effort is normal.      Breath sounds: Normal breath sounds.   Musculoskeletal:         General: Normal range of motion.   Skin:     General: Skin is warm and dry.      Findings: Rash present.        "   Neurological:      Mental Status: She is alert and oriented to person, place, and time.   Psychiatric:         Mood and Affect: Mood normal.         Behavior: Behavior normal.         Thought Content: Thought content normal.         Judgment: Judgment normal.       Assessment/Plan    Diagnoses and all orders for this visit:    1. Elevated fasting glucose (Primary)    2. Rash of face    3. Fluid level behind tympanic membrane of both ears  -     fluticasone (FLONASE) 50 MCG/ACT nasal spray; 1 spray by Each Nare route Daily.  Dispense: 15.8 mL; Refill: 11      Ms. Zheng is having elevated glucose readings.  Hemoglobin A1c last month was 5.6.  I have instructed her to follow a diabetic diet and increase activity for management of elevated glucose readings.  Recommend limiting sugars and starches, and increasing protein, vegetable, and water intake.  Recommend 150 minutes of exercise weekly.  Recheck hemoglobin A1c at next appointment.    Rash of the face has been present since approximately 1 week after starting topical clindamycin.  She does have erythromycin listed as an allergy, I have discussed that she could be having a reaction to clindamycin.  Recommend stopping this medication for 1 to 2 weeks to see if there is an improvement of the rash.  Also recommend following up with dermatology for further treatment of dermatitis or if no improvement of rash.    She does report nasal congestion and mild middle ear effusion present on exam.  Start Flonase.      Return if symptoms worsen or fail to improve.      AARON Hummel  13:10 CDT  7/18/2024   Electronically signed

## 2024-07-22 DIAGNOSIS — M79.7 FIBROMYALGIA: ICD-10-CM

## 2024-07-22 RX ORDER — GABAPENTIN 300 MG/1
300 CAPSULE ORAL 2 TIMES DAILY
Qty: 60 CAPSULE | Refills: 5 | OUTPATIENT
Start: 2024-07-22

## 2024-07-22 NOTE — TELEPHONE ENCOUNTER
Caller: Zena Zheng    Relationship: Self    Best call back number: [phone]     Requested Prescriptions:   Requested Prescriptions     Pending Prescriptions Disp Refills    gabapentin (NEURONTIN) 300 MG capsule 60 capsule 5     Sig: Take 1 capsule by mouth 2 (Two) Times a Day.        Pharmacy where request should be sent: [facility] #[id number] - [place], [place] - [address] AT ACROSS FROM [facility] [phone] [facility] [phone]      Last office visit with prescribing clinician: 6/18/2024   Last telemedicine visit with prescribing clinician: Visit date not found   Next office visit with prescribing clinician: Visit date not found     Additional details provided by patient: COMPLETLEY OUT     Does the patient have less than a 3 day supply:  [x] Yes  [] No    Would you like a call back once the refill request has been completed: [x] Yes [] No    If the office needs to give you a call back, can they leave a voicemail: [x] Yes [] No    Raúl Malone Rep   07/22/24 15:20 CDT

## 2024-07-22 NOTE — TELEPHONE ENCOUNTER
Rx Refill Note  Requested Prescriptions     Pending Prescriptions Disp Refills    gabapentin (NEURONTIN) 300 MG capsule 60 capsule 5     Sig: Take 1 capsule by mouth 2 (Two) Times a Day.      Last office visit with prescribing clinician: 6/18/2024   Last telemedicine visit with prescribing clinician: Visit date not found   Next office visit with prescribing clinician: Visit date not found                         Would you like a call back once the refill request has been completed: [] Yes [] No    If the office needs to give you a call back, can they leave a voicemail: [] Yes [] No    Carlos Souza MA  07/22/24, 15:35 CDT

## 2024-07-24 DIAGNOSIS — M79.7 FIBROMYALGIA: ICD-10-CM

## 2024-07-24 RX ORDER — GABAPENTIN 300 MG/1
300 CAPSULE ORAL 2 TIMES DAILY
Qty: 60 CAPSULE | Refills: 5 | OUTPATIENT
Start: 2024-07-24

## 2024-07-24 NOTE — TELEPHONE ENCOUNTER
Rx Refill Note  Requested Prescriptions     Pending Prescriptions Disp Refills    gabapentin (NEURONTIN) 300 MG capsule 60 capsule 5     Sig: Take 1 capsule by mouth 2 (Two) Times a Day.      Last office visit with prescribing clinician: 6/18/2024   Last telemedicine visit with prescribing clinician: Visit date not found   Next office visit with prescribing clinician: Visit date not found                         Would you like a call back once the refill request has been completed: [] Yes [] No    If the office needs to give you a call back, can they leave a voicemail: [] Yes [] No    Carlos Souza MA  07/24/24, 10:43 CDT        PATIENTS MEDICATION WAS SENT TO [facility] ON 06/19/2024 WITH 5 REFILLS.     PATIENT HAS BEEN CALLED, GIVEN MESSAGE AND UNDERSTANDING VOICED.  SHE WILL CALL THE [facility].

## 2024-07-27 PROCEDURE — 87591 N.GONORRHOEAE DNA AMP PROB: CPT | Performed by: NURSE PRACTITIONER

## 2024-07-27 PROCEDURE — 87798 DETECT AGENT NOS DNA AMP: CPT | Performed by: NURSE PRACTITIONER

## 2024-07-27 PROCEDURE — 87801 DETECT AGNT MULT DNA AMPLI: CPT | Performed by: NURSE PRACTITIONER

## 2024-07-27 PROCEDURE — 87086 URINE CULTURE/COLONY COUNT: CPT | Performed by: NURSE PRACTITIONER

## 2024-07-27 PROCEDURE — 87491 CHLMYD TRACH DNA AMP PROBE: CPT | Performed by: NURSE PRACTITIONER

## 2024-07-27 PROCEDURE — 87661 TRICHOMONAS VAGINALIS AMPLIF: CPT | Performed by: NURSE PRACTITIONER

## 2024-08-05 ENCOUNTER — HOSPITAL ENCOUNTER (OUTPATIENT)
Dept: CT IMAGING | Facility: HOSPITAL | Age: 44
Discharge: HOME | End: 2024-08-05
Admitting: CLINICAL NURSE SPECIALIST
Payer: COMMERCIAL

## 2024-08-05 DIAGNOSIS — K76.9 LIVER LESION: ICD-10-CM

## 2024-08-05 PROCEDURE — 25510000001 IOPAMIDOL PER 1 ML: Performed by: CLINICAL NURSE SPECIALIST

## 2024-08-05 PROCEDURE — 74178 CT ABD&PLV WO CNTR FLWD CNTR: CPT

## 2024-08-05 RX ADMIN — IOPAMIDOL 100 ML: 755 INJECTION, SOLUTION INTRAVENOUS at 11:39

## 2024-08-05 SDOH — ECONOMIC STABILITY: FOOD INSECURITY: WITHIN THE PAST 12 MONTHS, YOU WORRIED THAT YOUR FOOD WOULD RUN OUT BEFORE YOU GOT MONEY TO BUY MORE.: SOMETIMES TRUE

## 2024-08-05 SDOH — ECONOMIC STABILITY: FOOD INSECURITY: WITHIN THE PAST 12 MONTHS, THE FOOD YOU BOUGHT JUST DIDN'T LAST AND YOU DIDN'T HAVE MONEY TO GET MORE.: OFTEN TRUE

## 2024-08-05 SDOH — ECONOMIC STABILITY: TRANSPORTATION INSECURITY
IN THE PAST 12 MONTHS, HAS LACK OF TRANSPORTATION KEPT YOU FROM MEETINGS, WORK, OR FROM GETTING THINGS NEEDED FOR DAILY LIVING?: NO

## 2024-08-05 SDOH — ECONOMIC STABILITY: INCOME INSECURITY: HOW HARD IS IT FOR YOU TO PAY FOR THE VERY BASICS LIKE FOOD, HOUSING, MEDICAL CARE, AND HEATING?: HARD

## 2024-08-05 ASSESSMENT — PATIENT HEALTH QUESTIONNAIRE - PHQ9
2. FEELING DOWN, DEPRESSED OR HOPELESS: SEVERAL DAYS
SUM OF ALL RESPONSES TO PHQ9 QUESTIONS 1 & 2: 2
2. FEELING DOWN, DEPRESSED OR HOPELESS: SEVERAL DAYS
SUM OF ALL RESPONSES TO PHQ QUESTIONS 1-9: 2
1. LITTLE INTEREST OR PLEASURE IN DOING THINGS: SEVERAL DAYS
SUM OF ALL RESPONSES TO PHQ QUESTIONS 1-9: 2
1. LITTLE INTEREST OR PLEASURE IN DOING THINGS: SEVERAL DAYS
SUM OF ALL RESPONSES TO PHQ9 QUESTIONS 1 & 2: 2

## 2024-08-07 ENCOUNTER — OFFICE VISIT (OUTPATIENT)
Dept: OBGYN CLINIC | Age: 44
End: 2024-08-07
Payer: COMMERCIAL

## 2024-08-07 VITALS
DIASTOLIC BLOOD PRESSURE: 66 MMHG | SYSTOLIC BLOOD PRESSURE: 110 MMHG | WEIGHT: 168 LBS | HEART RATE: 56 BPM | BODY MASS INDEX: 26.31 KG/M2

## 2024-08-07 DIAGNOSIS — R10.2 PELVIC PAIN: Primary | ICD-10-CM

## 2024-08-07 DIAGNOSIS — R10.32 LEFT LOWER QUADRANT PAIN: ICD-10-CM

## 2024-08-07 PROCEDURE — 99213 OFFICE O/P EST LOW 20 MIN: CPT | Performed by: NURSE PRACTITIONER

## 2024-08-07 RX ORDER — TIZANIDINE 4 MG/1
4 TABLET ORAL 2 TIMES DAILY
COMMUNITY

## 2024-08-07 RX ORDER — ONDANSETRON 4 MG/1
TABLET, FILM COATED ORAL
COMMUNITY
Start: 2023-12-08

## 2024-08-08 NOTE — PROGRESS NOTES
[facility]  CNM Office Note    Roxann Colindres is a 44 y.o. female who presents today for her medical conditions/ complaints as noted below.  Chief Complaint   Patient presents with    Pelvic Pain     Pt is here for pelvic pain, states it comes and goes. She did have CT abdomen pelvis for her liver, she has not had any imaging for her pelvic pain.          Roxann presents to the office to discuss LLQ pain, and bloating she has been experiencing. Pt states menses are regular and typically last about 8 days. Pt states she has started to eliminate gluten from her diet and that does seem to have helped somewhat.     Pelvic Pain  The patient's primary symptoms include pelvic pain. This is a recurrent problem. The current episode started more than 1 month ago. The problem occurs intermittently. The pain is mild. The problem affects the left side. She is not pregnant. Associated symptoms include flank pain. She uses tubal ligation for contraception. Her menstrual history has been regular.       Patient Active Problem List   Diagnosis    CHANTALE III (cervical intraepithelial neoplasia grade III) with severe dysplasia       Patient's last menstrual period was 2024.      Past Medical History:   Diagnosis Date    Allergic     Anemia     with pregnancy    ASCUS favor benign 10/2015    Depression     Fibromyalgia     Recurrent miscarriages      Past Surgical History:   Procedure Laterality Date    ANKLE SURGERY Left     aprox in     DENTAL SURGERY      Tooth Implant    DILATION AND CURETTAGE OF UTERUS      LEEP N/A 2020    LEEP performed by Chica Copeland MD at [facility] OR    SALPINGECTOMY Bilateral     Dr. Burrell     Family History   Problem Relation Age of Onset    Diabetes Paternal Grandmother     Stroke Father      Social History     Tobacco Use    Smoking status: Never    Smokeless tobacco: Never   Substance Use Topics    Alcohol use: Yes     Comment: socially       Current

## 2024-08-12 ENCOUNTER — OFFICE VISIT (OUTPATIENT)
Dept: GASTROENTEROLOGY | Facility: CLINIC | Age: 44
End: 2024-08-12
Payer: COMMERCIAL

## 2024-08-12 ENCOUNTER — HOSPITAL ENCOUNTER (OUTPATIENT)
Dept: ULTRASOUND IMAGING | Age: 44
Discharge: HOME | End: 2024-08-12
Payer: COMMERCIAL

## 2024-08-12 VITALS
DIASTOLIC BLOOD PRESSURE: 70 MMHG | TEMPERATURE: 97.7 F | HEART RATE: 59 BPM | OXYGEN SATURATION: 99 % | BODY MASS INDEX: 30.08 KG/M2 | WEIGHT: 187.2 LBS | SYSTOLIC BLOOD PRESSURE: 122 MMHG | HEIGHT: 66 IN

## 2024-08-12 DIAGNOSIS — Z12.11 ENCOUNTER FOR SCREENING FOR MALIGNANT NEOPLASM OF COLON: ICD-10-CM

## 2024-08-12 DIAGNOSIS — R93.2 ABNORMAL ULTRASOUND OF GALLBLADDER: Primary | ICD-10-CM

## 2024-08-12 DIAGNOSIS — Z78.9 NONSMOKER: ICD-10-CM

## 2024-08-12 DIAGNOSIS — R11.2 NAUSEA AND VOMITING, UNSPECIFIED VOMITING TYPE: ICD-10-CM

## 2024-08-12 DIAGNOSIS — R10.32 LEFT LOWER QUADRANT PAIN: ICD-10-CM

## 2024-08-12 DIAGNOSIS — K82.4 GALLBLADDER POLYP: ICD-10-CM

## 2024-08-12 DIAGNOSIS — K76.9 LIVER LESION: ICD-10-CM

## 2024-08-12 DIAGNOSIS — R10.2 PELVIC PAIN: ICD-10-CM

## 2024-08-12 PROCEDURE — 76830 TRANSVAGINAL US NON-OB: CPT

## 2024-08-12 PROCEDURE — 99214 OFFICE O/P EST MOD 30 MIN: CPT | Performed by: CLINICAL NURSE SPECIALIST

## 2024-08-12 RX ORDER — SODIUM, POTASSIUM,MAG SULFATES 17.5-3.13G
SOLUTION, RECONSTITUTED, ORAL ORAL
Qty: 177 ML | Refills: 0 | Status: SHIPPED | OUTPATIENT
Start: 2024-08-12

## 2024-08-12 NOTE — PROGRESS NOTES
Zena Zheng  1980 8/12/2024  Chief Complaint   Patient presents with    GI Problem     Discuss ct of abd & pelvis, colonoscopy screening     Subjective   HPI  Zena Zheng is a 44 y.o. female who presents as a referral for preventative maintenance. She has no complaints of nausea or vomiting. No change in bowels. No wt loss. No BRBPR. No melena. There is no family hx for colon cancer. No abdominal pain.  She has never had colonoscopy.    Liver Lesions abnormal ultrasound:  Follow up CT scan performed due to ultrasound of the gallbladder abnormalities from 2/15/24  IMPRESSION:  1. No acute abnormality is identified, there appears to be a tiny 4 mm  polyp along the posterior wall of the gallbladder, partially obscured by  some dependent sludge. No gallstones or evidence of cholecystitis.  2. Slight increase in size of a hyperechoic focus within the liver  adjacent to the gallbladder fossa, measuring up to 12 mm compared with  10 mm before. This may represent a hemangioma, though this is not  visualized on previous MRI. Consider repeat ultrasound or CT in 6  months.     Report and images were stored in PACS per institutional and state  regulations.     This report was signed and finalized on 2/15/2024 10:43 AM by Dr. Avery Christie MD.    RUQ PAIN/NAUSEA:  Today she says that she does have nausea persistent and eating is a problem for her. She has some RUQ abdominal pain that is sharp and stabbing a times. Constant dull ache. This has been ongoing for years worse here recently and more persistent the past few months. No radiation. She rates it a 5 out of 10. Her endoscopy did not reveal a source for her symptoms as noted below.     CT abdomen pelvis with and without contrast 7/15/24 for liver lesions/cysts  IMPRESSION:     1.  Multiple small sub-6 mm hypodense liver lesions. These are difficult  to definitively characterize by CT given small size but are similar in  size to prior studies including  an abdominal MRI from 10/30/2023 where  they had imaging characteristics in keeping with small hepatic cysts.     2.  Moderate to large volume stool throughout the colon.      This report was signed and finalized on 8/5/2024 12:13 PM by Dr. Carlos Oreilly MD.  Past Medical History:   Diagnosis Date    Anxiety     BV (bacterial vaginosis)     Depression     Hypertension      Past Surgical History:   Procedure Laterality Date    ANKLE SURGERY      ENDOSCOPY N/A 12/04/2023    No endoscopic esophageal abnormality to explain patient's dysphagia. Esophagus dilated. Dilated. - There is no endoscopic evidence of Ingram's esophagus. - Normal stomach. - Normal examined duodenum. - No specimens collecte    TUBAL ABDOMINAL LIGATION       Outpatient Medications Marked as Taking for the 8/12/24 encounter (Office Visit) with Alicia Rankin APRN   Medication Sig Dispense Refill    Azelastine HCl 137 MCG/SPRAY solution SPRAY 1 TO 2 SPRAYS INTO EACH NOSTRIL TWICE A DAY AS NEEDED      fluticasone (FLONASE) 50 MCG/ACT nasal spray 1 spray by Each Nare route Daily. 15.8 mL 11    gabapentin (NEURONTIN) 300 MG capsule Take 1 capsule by mouth 2 (Two) Times a Day. 60 capsule 5    lamoTRIgine (LaMICtal) 25 MG tablet 2 TABLETS NIGHTLY      loratadine (Claritin) 10 MG tablet Take 1 tablet by mouth Daily.      metoprolol succinate XL (TOPROL-XL) 25 MG 24 hr tablet Take 1 tablet by mouth Daily.      NON FORMULARY Pt states that there are 2 more meds she takes name unknown for acne and rashs      ondansetron (ZOFRAN) 4 MG tablet Every 8 (Eight) Hours.      pimecrolimus (ELIDEL) 1 % cream APPLY TO FULL FACE EVERY NIGHT      tiZANidine (ZANAFLEX) 4 MG tablet Take 1 tablet by mouth 3 (Three) Times a Day.       Allergies   Allergen Reactions    Amoxicillin Rash    Clindamycin/Lincomycin Rash    Doxycycline Hives and Itching    Erythromycin Hives and Itching    Sulfa Antibiotics Hives and Itching    Tape Rash     Social History      Socioeconomic History    Marital status:    Tobacco Use    Smoking status: Never    Smokeless tobacco: Never   Vaping Use    Vaping status: Never Used   Substance and Sexual Activity    Alcohol use: Not Currently    Drug use: Never    Sexual activity: Yes     Partners: Male     Birth control/protection: Surgical     Family History   Problem Relation Age of Onset    Colon polyps Mother     Arthritis Mother     Heart disease Mother     Stroke Father     No Known Problems Sister     No Known Problems Brother     No Known Problems Daughter     No Known Problems Son     No Known Problems Maternal Aunt     No Known Problems Paternal Aunt     No Known Problems Maternal Grandmother     No Known Problems Paternal Grandmother     BRCA 1/2 Neg Hx     Breast cancer Neg Hx     Colon cancer Neg Hx     Endometrial cancer Neg Hx     Ovarian cancer Neg Hx      Health Maintenance   Topic Date Due    COLORECTAL CANCER SCREENING  Never done    ANNUAL PHYSICAL  Never done    INFLUENZA VACCINE  08/01/2024    COVID-19 Vaccine (1 - 2023-24 season) 10/07/2024 (Originally 9/1/2023)    BMI FOLLOWUP  09/27/2024    MAMMOGRAM  06/28/2026    PAP SMEAR  09/18/2026    TDAP/TD VACCINES (3 - Td or Tdap) 12/18/2028    HEPATITIS C SCREENING  Completed    Pneumococcal Vaccine 0-64  Aged Out       REVIEW OF SYSTEMS  General: well appearing, no fever chills or sweats, no unexplained wt loss  HEENT: no acute visual or hearing disturbances  Cardiovascular: No chest pain or palpitations  Pulmonary: No shortness of breath, coughing, wheezing or hemoptysis  : No burning, urgency, hematuria, or dysuria  GI: NAUSEA, RUQ PAIN, CONSTIPATION AND LIVER LESION/CYST  Musculoskeletal: No joint pain or stiffness  Peripheral: no edema  Skin: No lesions or rashes  Neuro: No dizziness, headaches, stroke, syncope  Endocrine: No hot or cold intolerances  Hematological: No blood dyscrasias    Objective   Vitals:    08/12/24 0928   BP: 122/70   Pulse: 59   Temp:  "97.7 °F (36.5 °C)   SpO2: 99%   Weight: 84.9 kg (187 lb 3.2 oz)   Height: 167.6 cm (66\")     Body mass index is 30.21 kg/m².         PHYSICAL EXAM  General: age appropriate well nourished well appearing, no acute distress  Head: normocephalic and atraumatic  Global assessment-supple  Neck-No JVD noted, no lymphadenopathy  Pulmonary-clear to auscultation bilaterally, normal respiratory effort  Cardiovascular-normal rate and rhythm, normal heart sounds, S1 and S2 noted  Abdomen-soft, non tender, non distended, normal bowel sounds all 4 quadrants, no hepatosplenomegaly noted  Extremities-No clubbing cyanosis or edema  Neuro-Non focal, converses appropriately, awake, alert, oriented    Assessment & Plan     Diagnoses and all orders for this visit:    1. Abnormal ultrasound of gallbladder (Primary)  -     Ambulatory Referral to General Surgery    2. Nausea and vomiting, unspecified vomiting type    3. Liver lesion    4. Gallbladder polyp    5. Encounter for screening for malignant neoplasm of colon  -     Case Request; Standing  -     Case Request  -     sodium-potassium-magnesium sulfates (Suprep Bowel Prep Kit) 17.5-3.13-1.6 GM/177ML solution oral solution; Take as directed by office instructions provided  Dispense: 177 mL; Refill: 0    6. Nonsmoker    Other orders  -     Implement Anesthesia Orders Day of Procedure; Standing  -     Follow Anesthesia Guidelines / Protocol; Future  -     Obtain Informed Consent; Future  -     Verify bowel prep was successful; Standing  -     Obtain Informed Consent; Standing    Discussed constipation noted on her CT scan and this could be contributing factor a well. I suggested Miralax daily or fiber with increase in water intake  Given the nature of her nausea and pain will have her go see surgery to discuss gallbladder findings. She had a tiny polyp at 4mm we discussed that as well and the low potential for malignancy. She did have some other findings as well reviewed and discussed. " MRI was consistent with liver cysts and follow up CT did not reveal anything otherwise. Given her symptoms of nausea and pain will send to surgery for consideration.   Will follow.   Due for colonoscopy and aware not to have surgery then colonoscopy. I explained we do want to do this before surgery if that is determined.     COLONOSCOPY WITH ANESTHESIA (N/A)  Body mass index is 30.21 kg/m².    Patient instructions on prep prior to procedure provided to the patient.    All risks, benefits, alternatives, and indications of colonoscopy procedure have been discussed with the patient. Risks to include perforation of the colon requiring possible surgery or colostomy, risk of bleeding from biopsies or removal of colon tissue, possibility of missing a colon polyp or cancer, or adverse drug reaction.  Benefits to include the diagnosis and management of disease of the colon and rectum. Alternatives to include barium enema, radiographic evaluation, lab testing or no intervention. Pt verbalizes understanding and agrees.     Alicia Rankin, APRN  2024  10:22 CDT      IF YOU SMOKE OR USE TOBACCO PLEASE READ THE FOLLOWIN minutes reading provided    Why is smoking bad for me?  Smoking increases the risk of heart disease, lung disease, vascular disease, stroke, and cancer.     If you smoke, STOP!    If you would like more information on quitting smoking, please visit the [employer] website: [email or web address]/corporate/healthier-[email or web address]   This link will provide additional resources including the QUIT line and the Beat the Pack support groups.     For more information:    Quit Now [place]  -QUIT-NOW  [email or web address]

## 2024-08-13 ENCOUNTER — TELEPHONE (OUTPATIENT)
Dept: GASTROENTEROLOGY | Facility: CLINIC | Age: 44
End: 2024-08-13

## 2024-08-13 NOTE — TELEPHONE ENCOUNTER
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

## 2024-08-21 ENCOUNTER — HOSPITAL ENCOUNTER (EMERGENCY)
Facility: HOSPITAL | Age: 44
Discharge: HOME | End: 2024-08-21
Attending: EMERGENCY MEDICINE
Payer: COMMERCIAL

## 2024-08-21 ENCOUNTER — APPOINTMENT (OUTPATIENT)
Dept: GENERAL RADIOLOGY | Facility: HOSPITAL | Age: 44
End: 2024-08-21
Payer: COMMERCIAL

## 2024-08-21 VITALS
HEIGHT: 66 IN | HEART RATE: 63 BPM | TEMPERATURE: 98.6 F | SYSTOLIC BLOOD PRESSURE: 106 MMHG | OXYGEN SATURATION: 99 % | RESPIRATION RATE: 18 BRPM | BODY MASS INDEX: 30.29 KG/M2 | DIASTOLIC BLOOD PRESSURE: 68 MMHG | WEIGHT: 188.5 LBS

## 2024-08-21 DIAGNOSIS — U07.1 COVID-19: Primary | ICD-10-CM

## 2024-08-21 LAB
ALBUMIN SERPL-MCNC: 4.1 G/DL (ref 3.5–5.2)
ALBUMIN/GLOB SERPL: 1.8 G/DL
ALP SERPL-CCNC: 53 U/L (ref 39–117)
ALT SERPL W P-5'-P-CCNC: 8 U/L (ref 1–33)
ANION GAP SERPL CALCULATED.3IONS-SCNC: 9 MMOL/L (ref 5–15)
AST SERPL-CCNC: 12 U/L (ref 1–32)
B-HCG UR QL: NEGATIVE
BASOPHILS # BLD AUTO: 0.06 10*3/MM3 (ref 0–0.2)
BASOPHILS NFR BLD AUTO: 0.6 % (ref 0–1.5)
BILIRUB SERPL-MCNC: 0.3 MG/DL (ref 0–1.2)
BUN SERPL-MCNC: 10 MG/DL (ref 6–20)
BUN/CREAT SERPL: 11 (ref 7–25)
CALCIUM SPEC-SCNC: 9.2 MG/DL (ref 8.6–10.5)
CHLORIDE SERPL-SCNC: 106 MMOL/L (ref 98–107)
CO2 SERPL-SCNC: 24 MMOL/L (ref 22–29)
CREAT SERPL-MCNC: 0.91 MG/DL (ref 0.57–1)
D DIMER PPP FEU-MCNC: 0.33 MCGFEU/ML (ref 0–0.5)
DEPRECATED RDW RBC AUTO: 45.3 FL (ref 37–54)
EGFRCR SERPLBLD CKD-EPI 2021: 79.4 ML/MIN/1.73
EOSINOPHIL # BLD AUTO: 0.22 10*3/MM3 (ref 0–0.4)
EOSINOPHIL NFR BLD AUTO: 2.1 % (ref 0.3–6.2)
ERYTHROCYTE [DISTWIDTH] IN BLOOD BY AUTOMATED COUNT: 13.4 % (ref 12.3–15.4)
EXPIRATION DATE: NORMAL
FLUAV RNA RESP QL NAA+PROBE: NOT DETECTED
FLUBV RNA RESP QL NAA+PROBE: NOT DETECTED
GLOBULIN UR ELPH-MCNC: 2.3 GM/DL
GLUCOSE SERPL-MCNC: 77 MG/DL (ref 65–99)
HCT VFR BLD AUTO: 41.2 % (ref 34–46.6)
HGB BLD-MCNC: 13.1 G/DL (ref 12–15.9)
IMM GRANULOCYTES # BLD AUTO: 0.03 10*3/MM3 (ref 0–0.05)
IMM GRANULOCYTES NFR BLD AUTO: 0.3 % (ref 0–0.5)
INTERNAL NEGATIVE CONTROL: NEGATIVE
INTERNAL POSITIVE CONTROL: POSITIVE
LYMPHOCYTES # BLD AUTO: 2.32 10*3/MM3 (ref 0.7–3.1)
LYMPHOCYTES NFR BLD AUTO: 21.8 % (ref 19.6–45.3)
Lab: NORMAL
MCH RBC QN AUTO: 28.9 PG (ref 26.6–33)
MCHC RBC AUTO-ENTMCNC: 31.8 G/DL (ref 31.5–35.7)
MCV RBC AUTO: 90.7 FL (ref 79–97)
MONOCYTES # BLD AUTO: 1.01 10*3/MM3 (ref 0.1–0.9)
MONOCYTES NFR BLD AUTO: 9.5 % (ref 5–12)
NEUTROPHILS NFR BLD AUTO: 6.99 10*3/MM3 (ref 1.7–7)
NEUTROPHILS NFR BLD AUTO: 65.7 % (ref 42.7–76)
NRBC BLD AUTO-RTO: 0 /100 WBC (ref 0–0.2)
PLATELET # BLD AUTO: 351 10*3/MM3 (ref 140–450)
PMV BLD AUTO: 10.7 FL (ref 6–12)
POTASSIUM SERPL-SCNC: 4.4 MMOL/L (ref 3.5–5.2)
PROT SERPL-MCNC: 6.4 G/DL (ref 6–8.5)
QT INTERVAL: 422 MS
QTC INTERVAL: 410 MS
RBC # BLD AUTO: 4.54 10*6/MM3 (ref 3.77–5.28)
S PYO AG THROAT QL: NEGATIVE
SARS-COV-2 RNA RESP QL NAA+PROBE: DETECTED
SODIUM SERPL-SCNC: 139 MMOL/L (ref 136–145)
TSH SERPL DL<=0.05 MIU/L-ACNC: 1.07 UIU/ML (ref 0.27–4.2)
WBC NRBC COR # BLD AUTO: 10.63 10*3/MM3 (ref 3.4–10.8)

## 2024-08-21 PROCEDURE — 80050 GENERAL HEALTH PANEL: CPT | Performed by: EMERGENCY MEDICINE

## 2024-08-21 PROCEDURE — 71045 X-RAY EXAM CHEST 1 VIEW: CPT

## 2024-08-21 PROCEDURE — 87880 STREP A ASSAY W/OPTIC: CPT | Performed by: EMERGENCY MEDICINE

## 2024-08-21 PROCEDURE — 87081 CULTURE SCREEN ONLY: CPT | Performed by: EMERGENCY MEDICINE

## 2024-08-21 PROCEDURE — 85379 FIBRIN DEGRADATION QUANT: CPT | Performed by: EMERGENCY MEDICINE

## 2024-08-21 PROCEDURE — 81025 URINE PREGNANCY TEST: CPT | Performed by: EMERGENCY MEDICINE

## 2024-08-21 PROCEDURE — 93005 ELECTROCARDIOGRAM TRACING: CPT | Performed by: EMERGENCY MEDICINE

## 2024-08-21 PROCEDURE — 99284 EMERGENCY DEPT VISIT MOD MDM: CPT

## 2024-08-21 PROCEDURE — 87636 SARSCOV2 & INF A&B AMP PRB: CPT | Performed by: EMERGENCY MEDICINE

## 2024-08-21 PROCEDURE — 25810000003 SODIUM CHLORIDE 0.9 % SOLUTION: Performed by: EMERGENCY MEDICINE

## 2024-08-21 RX ORDER — SODIUM CHLORIDE 0.9 % (FLUSH) 0.9 %
10 SYRINGE (ML) INJECTION AS NEEDED
Status: DISCONTINUED | OUTPATIENT
Start: 2024-08-21 | End: 2024-08-21 | Stop reason: HOSPADM

## 2024-08-21 RX ORDER — BROMPHENIRAMINE MALEATE, PSEUDOEPHEDRINE HYDROCHLORIDE, AND DEXTROMETHORPHAN HYDROBROMIDE 2; 30; 10 MG/5ML; MG/5ML; MG/5ML
2.5 SYRUP ORAL 4 TIMES DAILY PRN
Qty: 118 ML | Refills: 0 | Status: SHIPPED | OUTPATIENT
Start: 2024-08-21

## 2024-08-21 RX ADMIN — SODIUM CHLORIDE 500 ML: 9 INJECTION, SOLUTION INTRAVENOUS at 07:36

## 2024-08-21 NOTE — Clinical Note
[facility] EMERGENCY DEPARTMENT  [address]  [facility] [address]  Phone: [phone]    Zena Zheng was seen and treated in our emergency department on 8/21/2024.  She may return to work on 08/26/2024.         Thank you for choosing [facility].    Shannan Baig MD

## 2024-08-21 NOTE — ED PROVIDER NOTES
Subjective   History of Present Illness  Patient is a 44-year-old female with a history of hypertension who presents to the ER with multiple complaints.  Patient states that for the last 3 days she has had viral type symptoms including cough, congestion, chills and sore throat.  Patient states that for the last 2 mornings when she wakes up she feels very fatigued, lightheaded and dizzy.  Patient states that last night she felt her heart racing and her heart rate was 103.  Patient took an extra metoprolol.  Patient does have mild shortness of air.  She denies any fever, chest pain, abdominal pain, nausea vomiting diarrhea, urinary changes, neurologic changes.      Review of Systems   Constitutional:  Positive for chills and fatigue.   HENT:  Positive for congestion and sore throat.    Eyes: Negative.    Respiratory:  Positive for cough and shortness of breath.    Cardiovascular:  Positive for palpitations.   Gastrointestinal: Negative.    Endocrine: Negative.    Genitourinary: Negative.    Musculoskeletal: Negative.    Skin: Negative.    Allergic/Immunologic: Negative.    Neurological:  Positive for dizziness and light-headedness.   Hematological: Negative.    Psychiatric/Behavioral: Negative.     All other systems reviewed and are negative.      Past Medical History:   Diagnosis Date    Anxiety     BV (bacterial vaginosis)     Depression     Hypertension        Allergies   Allergen Reactions    Amoxicillin Rash    Clindamycin/Lincomycin Rash    Doxycycline Hives and Itching    Erythromycin Hives and Itching    Sulfa Antibiotics Hives and Itching    Tape Rash       Past Surgical History:   Procedure Laterality Date    ANKLE SURGERY      ENDOSCOPY N/A 12/04/2023    No endoscopic esophageal abnormality to explain patient's dysphagia. Esophagus dilated. Dilated. - There is no endoscopic evidence of Ingram's esophagus. - Normal stomach. - Normal examined duodenum. - No specimens collecte    TUBAL ABDOMINAL LIGATION          Family History   Problem Relation Age of Onset    Colon polyps Mother     Arthritis Mother     Heart disease Mother     Stroke Father     No Known Problems Sister     No Known Problems Brother     No Known Problems Daughter     No Known Problems Son     No Known Problems Maternal Aunt     No Known Problems Paternal Aunt     No Known Problems Maternal Grandmother     No Known Problems Paternal Grandmother     BRCA 1/2 Neg Hx     Breast cancer Neg Hx     Colon cancer Neg Hx     Endometrial cancer Neg Hx     Ovarian cancer Neg Hx        Social History     Socioeconomic History    Marital status:    Tobacco Use    Smoking status: Never    Smokeless tobacco: Never   Vaping Use    Vaping status: Never Used   Substance and Sexual Activity    Alcohol use: Not Currently    Drug use: Never    Sexual activity: Yes     Partners: Male     Birth control/protection: Surgical           Objective   Physical Exam  Vitals and nursing note reviewed.   Constitutional:       Appearance: She is well-developed.   HENT:      Head: Normocephalic and atraumatic.      Mouth/Throat:      Mouth: Mucous membranes are moist.      Pharynx: Oropharynx is clear. No oropharyngeal exudate or posterior oropharyngeal erythema.   Eyes:      Conjunctiva/sclera: Conjunctivae normal.      Pupils: Pupils are equal, round, and reactive to light.   Cardiovascular:      Rate and Rhythm: Normal rate and regular rhythm.      Heart sounds: Normal heart sounds.   Pulmonary:      Effort: Pulmonary effort is normal.      Breath sounds: Normal breath sounds.   Abdominal:      Palpations: Abdomen is soft.      Tenderness: There is no abdominal tenderness.   Musculoskeletal:         General: No deformity. Normal range of motion.      Cervical back: Normal range of motion.   Skin:     General: Skin is warm.   Neurological:      Mental Status: She is alert and oriented to person, place, and time.      Cranial Nerves: Cranial nerves 2-12 are intact.       Sensory: Sensation is intact.      Motor: Motor function is intact.      Coordination: Coordination is intact.   Psychiatric:         Behavior: Behavior normal.         Procedures           ED Course      EKG as interpreted by me: Sinus bradycardia with a rate of 57 with a sinus arrhythmia, no acute ischemia or infarction                             Lab Results (last 24 hours)       Procedure Component Value Units Date/Time    POC Urine Pregnancy [[id number]]  (Normal) Collected: 08/21/24 0729    Specimen: Urine Updated: 08/21/24 0730     HCG, Urine, QL Negative     Lot Number [id number]     Internal Positive Control Positive     Internal Negative Control Negative     Expiration Date 01/28/2025    CBC & Differential [[id number]]  (Abnormal) Collected: 08/21/24 0737    Specimen: Blood Updated: 08/21/24 0802    Narrative:      The following orders were created for panel order CBC & Differential.  Procedure                               Abnormality         Status                     ---------                               -----------         ------                     CBC Auto Differential[[id number]]        Abnormal            Final result                 Please view results for these tests on the individual orders.    Comprehensive Metabolic Panel [[id number]] Collected: 08/21/24 0737    Specimen: Blood Updated: 08/21/24 0819     Glucose 77 mg/dL      BUN 10 mg/dL      Creatinine 0.91 mg/dL      Sodium 139 mmol/L      Potassium 4.4 mmol/L      Chloride 106 mmol/L      CO2 24.0 mmol/L      Calcium 9.2 mg/dL      Total Protein 6.4 g/dL      Albumin 4.1 g/dL      ALT (SGPT) 8 U/L      AST (SGOT) 12 U/L      Alkaline Phosphatase 53 U/L      Total Bilirubin 0.3 mg/dL      Globulin 2.3 gm/dL      A/G Ratio 1.8 g/dL      BUN/Creatinine Ratio 11.0     Anion Gap 9.0 mmol/L      eGFR 79.4 mL/min/1.73     Narrative:      GFR Normal >60  Chronic Kidney Disease <60  Kidney Failure <15      D-dimer, Quantitative [[id number]]  (Normal)  "Collected: 08/21/24 0737    Specimen: Blood Updated: 08/21/24 0827     D-Dimer, Quantitative 0.33 MCGFEU/mL     Narrative:      According to the assay 's published package insert, a normal (<0.50 MCGFEU/mL) D-dimer result in conjunction with a non-high clinical probability assessment, excludes deep vein thrombosis (DVT) and pulmonary embolism (PE) with high sensitivity.    D-dimer values increase with age and this can make VTE exclusion of an older population difficult. To address this, the American College of [employer], based on best available evidence and recent guidelines, recommends that clinicians use age-adjusted D-dimer thresholds in patients greater than 50 years of age with: a) a low probability of PE who do not meet all Pulmonary Embolism Rule Out Criteria, or b) in those with intermediate probability of PE.   The formula for an age-adjusted D-dimer cut-off is \"age/100\".  For example, a 60 year old patient would have an age-adjusted cut-off of 0.60 MCGFEU/mL and an 80 year old 0.80 MCGFEU/mL.    COVID-19 and FLU A/B PCR, 1 HR TAT - Swab, Nasopharynx [[id number]]  (Abnormal) Collected: 08/21/24 0737    Specimen: Swab from Nasopharynx Updated: 08/21/24 0818     COVID19 Detected     Influenza A PCR Not Detected     Influenza B PCR Not Detected    Narrative:      Fact sheet for providers: https://www.fda.gov/media/538450/download    Fact sheet for patients: https://www.fda.gov/media/326069/download    Test performed by PCR.  Influenza A and Influenza B negative results should be considered presumptive in samples that have a positive SARS-CoV-2 result.    Competitive inhibition studies showed that SARS-CoV-2 virus, when present at concentrations above 3.6E+04 copies/mL, can inhibit the detection and amplification of influenza A and influenza B virus RNA if present at or below 1.8E+02 copies/mL or 4.9E+02 copies/mL, respectively, and may lead to false negative influenza virus results. If co-infection " with influenza A or influenza B virus is suspected in samples with a positive SARS-CoV-2 result, the sample should be re-tested with another FDA cleared, approved, or authorized influenza test, if influenza virus detection would change clinical management.    Rapid Strep A Screen - Swab, Throat [[id number]]  (Normal) Collected: 08/21/24 0737    Specimen: Swab from Throat Updated: 08/21/24 0819     Strep A Ag Negative    TSH [[id number]]  (Normal) Collected: 08/21/24 0737    Specimen: Blood Updated: 08/21/24 0822     TSH 1.070 uIU/mL     CBC Auto Differential [[id number]]  (Abnormal) Collected: 08/21/24 0737    Specimen: Blood Updated: 08/21/24 0802     WBC 10.63 10*3/mm3      RBC 4.54 10*6/mm3      Hemoglobin 13.1 g/dL      Hematocrit 41.2 %      MCV 90.7 fL      MCH 28.9 pg      MCHC 31.8 g/dL      RDW 13.4 %      RDW-SD 45.3 fl      MPV 10.7 fL      Platelets 351 10*3/mm3      Neutrophil % 65.7 %      Lymphocyte % 21.8 %      Monocyte % 9.5 %      Eosinophil % 2.1 %      Basophil % 0.6 %      Immature Grans % 0.3 %      Neutrophils, Absolute 6.99 10*3/mm3      Lymphocytes, Absolute 2.32 10*3/mm3      Monocytes, Absolute 1.01 10*3/mm3      Eosinophils, Absolute 0.22 10*3/mm3      Basophils, Absolute 0.06 10*3/mm3      Immature Grans, Absolute 0.03 10*3/mm3      nRBC 0.0 /100 WBC     Beta Strep Culture, Throat - Swab, Throat [[id number]] Collected: 08/21/24 0737    Specimen: Swab from Throat Updated: 08/21/24 0818           XR Chest 1 View   Final Result   No active disease is seen.               This report was signed and finalized on 8/21/2024 8:19 AM by Dr. Montez Zheng MD.                       Medical Decision Making  Patient is a 44-year-old female with a history of hypertension who presents to the ER with multiple complaints.  Patient states that for the last 3 days she has had viral type symptoms including cough, congestion, chills and sore throat.  Patient states that for the last 2 mornings when she wakes  up she feels very fatigued, lightheaded and dizzy.  Patient states that last night she felt her heart racing and her heart rate was 103.  Patient took an extra metoprolol.  Patient does have mild shortness of air.  She denies any fever, chest pain, abdominal pain, nausea vomiting diarrhea, urinary changes, neurologic changes.    Differential diagnosis: Viral syndrome, pulmonary embolus, pneumonia, electrolyte abnormality    Patient was given IV fluids.  Labs are unremarkable.  Viral swab was positive for COVID-19.  Chest x-ray was negative.  Patient had good sats and normal vitals while here in the ER.  Workup consistent with COVID-19.  Patient does not meet criteria for Paxlovid or steroids.  She will be discharged home with quarantine instructions and a prescription for Bromfed.  She is to follow-up with her PCP and is to return to the ER for any hypoxia, shortness of breath or other concerns.  Patient was agreeable to the plan and discharged home.      Problems Addressed:  COVID-19: complicated acute illness or injury    Amount and/or Complexity of Data Reviewed  Labs: ordered. Decision-making details documented in ED Course.  Radiology: ordered. Decision-making details documented in ED Course.  ECG/medicine tests: ordered. Decision-making details documented in ED Course.    Risk  Prescription drug management.        Final diagnoses:   COVID-19       ED Disposition  ED Disposition       ED Disposition   Discharge    Condition   Stable    Comment   --               DARIO Nichole MD  [address] [facility] [address]  [address]  [facility] [address]  [phone]    Schedule an appointment as soon as possible for a visit            Medication List        New Prescriptions      brompheniramine-pseudoephedrine-DM 30-2-10 MG/5ML syrup  Take 2.5 mL by mouth 4 (Four) Times a Day As Needed for Allergies.               Where to Get Your Medications        These medications were sent to [facility]/pharmacy #[id number] - [place], [place] -  [address] AT ACROSS FROM [address] - [phone]  - [phone] FX  [address] [facility] [address]      Phone: [phone]   brompheniramine-pseudoephedrine-DM 30-2-10 MG/5ML syrup            Shannan Baig MD  08/21/24 4647

## 2024-08-23 DIAGNOSIS — M79.7 FIBROMYALGIA: ICD-10-CM

## 2024-08-23 LAB — BACTERIA SPEC AEROBE CULT: NORMAL

## 2024-08-23 RX ORDER — GABAPENTIN 300 MG/1
300 CAPSULE ORAL 2 TIMES DAILY
Qty: 60 CAPSULE | Refills: 5 | OUTPATIENT
Start: 2024-08-23

## 2024-08-28 LAB
QT INTERVAL: 422 MS
QTC INTERVAL: 410 MS

## 2024-09-04 ENCOUNTER — OFFICE VISIT (OUTPATIENT)
Dept: SURGERY | Facility: CLINIC | Age: 44
End: 2024-09-04
Payer: COMMERCIAL

## 2024-09-04 VITALS
HEIGHT: 66 IN | WEIGHT: 181 LBS | OXYGEN SATURATION: 100 % | DIASTOLIC BLOOD PRESSURE: 80 MMHG | HEART RATE: 86 BPM | BODY MASS INDEX: 29.09 KG/M2 | SYSTOLIC BLOOD PRESSURE: 118 MMHG

## 2024-09-04 DIAGNOSIS — K82.4 GALLBLADDER POLYP: ICD-10-CM

## 2024-09-04 DIAGNOSIS — K82.8 GALLBLADDER SLUDGE: Primary | ICD-10-CM

## 2024-09-04 DIAGNOSIS — E66.3 OVERWEIGHT (BMI 25.0-29.9): ICD-10-CM

## 2024-09-04 PROCEDURE — 99204 OFFICE O/P NEW MOD 45 MIN: CPT | Performed by: STUDENT IN AN ORGANIZED HEALTH CARE EDUCATION/TRAINING PROGRAM

## 2024-09-04 RX ORDER — HEPARIN SODIUM 5000 [USP'U]/ML
5000 INJECTION, SOLUTION INTRAVENOUS; SUBCUTANEOUS EVERY 8 HOURS SCHEDULED
OUTPATIENT
Start: 2024-09-04

## 2024-09-04 RX ORDER — INDOCYANINE GREEN AND WATER 25 MG
3.75 KIT INJECTION ONCE
OUTPATIENT
Start: 2024-09-04

## 2024-09-04 NOTE — H&P (VIEW-ONLY)
Office New Patient History and Physical:     Referring Provider: Alicia Rankin A*    Chief Complaint   Patient presents with    Follow-up     Evaluation of gallbladder       Subjective .     History of present illness:    History of Present Illness  The patient presents for evaluation of gallbladder issues.    She experiences constant stomach discomfort, which worsens with eating. Additionally, she feels nauseous when she does not eat. The pain is localized in her epigastrium and does not radiate to her right side. She reports no instances of vomiting, fevers, or chills. Bloating has been noticed, leading her to eliminate gluten from her diet. She has no history of abdominal surgery and is not currently taking any blood thinners.     She reports no yellowing of her skin or eyes. Her most recent ultrasound was conducted in 02/2024. She has been under the care of Dr. Ramila Rankin.     She visited the ER on 09/21/2024 due to suspected ovarian cyst pain. She was unaware of a fracture on her left side. She is scheduled for a colonoscopy on 09/19/2024.    SOCIAL HISTORY  She does not smoke.       History  Past Medical History:   Diagnosis Date    Anxiety     BV (bacterial vaginosis)     Depression     Gallbladder sludge 9/4/2024    Hypertension    ,   Past Surgical History:   Procedure Laterality Date    ANKLE SURGERY      ENDOSCOPY N/A 12/04/2023    No endoscopic esophageal abnormality to explain patient's dysphagia. Esophagus dilated. Dilated. - There is no endoscopic evidence of Nigram's esophagus. - Normal stomach. - Normal examined duodenum. - No specimens collecte    TUBAL ABDOMINAL LIGATION     ,   Family History   Problem Relation Age of Onset    Colon polyps Mother     Arthritis Mother     Heart disease Mother     Stroke Father     No Known Problems Sister     No Known Problems Brother     No Known Problems Daughter     No Known Problems Son     No Known Problems Maternal Aunt     No Known Problems  Paternal Aunt     No Known Problems Maternal Grandmother     No Known Problems Paternal Grandmother     BRCA 1/2 Neg Hx     Breast cancer Neg Hx     Colon cancer Neg Hx     Endometrial cancer Neg Hx     Ovarian cancer Neg Hx    ,   Social History     Tobacco Use    Smoking status: Never    Smokeless tobacco: Never   Vaping Use    Vaping status: Never Used   Substance Use Topics    Alcohol use: Not Currently    Drug use: Never   , (Not in a hospital admission)   and Allergies:  Adhesive tape, Amoxicillin, Clindamycin/lincomycin, Doxycycline, Erythromycin, Sulfa antibiotics, and Tape    Current Outpatient Medications:     Azelastine HCl 137 MCG/SPRAY solution, SPRAY 1 TO 2 SPRAYS INTO EACH NOSTRIL TWICE A DAY AS NEEDED, Disp: , Rfl:     brompheniramine-pseudoephedrine-DM 30-2-10 MG/5ML syrup, Take 2.5 mL by mouth 4 (Four) Times a Day As Needed for Allergies., Disp: 118 mL, Rfl: 0    fluticasone (FLONASE) 50 MCG/ACT nasal spray, 1 spray by Each Nare route Daily., Disp: 15.8 mL, Rfl: 11    gabapentin (NEURONTIN) 300 MG capsule, Take 1 capsule by mouth 2 (Two) Times a Day., Disp: 60 capsule, Rfl: 5    INV ALLIANCE, AMB, KIT, Pharmacy Label,, Pt states that there are 2 more meds she takes name unknown for acne and rashs, Disp: , Rfl:     lamoTRIgine (LaMICtal) 25 MG tablet, 2 TABLETS NIGHTLY, Disp: , Rfl:     loratadine (Claritin) 10 MG tablet, Take 1 tablet by mouth Daily., Disp: , Rfl:     metoprolol succinate XL (TOPROL-XL) 25 MG 24 hr tablet, Take 1 tablet by mouth Daily., Disp: , Rfl:     metroNIDAZOLE (METROCREAM) 0.75 % cream, , Disp: , Rfl:     mometasone (ELOCON) 0.1 % solution, , Disp: , Rfl:     NON FORMULARY, Pt states that there are 2 more meds she takes name unknown for acne and rashs, Disp: , Rfl:     ondansetron (ZOFRAN) 4 MG tablet, Every 8 (Eight) Hours., Disp: , Rfl:     pimecrolimus (ELIDEL) 1 % cream, APPLY TO FULL FACE EVERY NIGHT, Disp: , Rfl:     tiZANidine (ZANAFLEX) 4 MG tablet, Take 1 tablet by  "mouth 3 (Three) Times a Day., Disp: , Rfl:     tiZANidine (ZANAFLEX) 4 MG tablet, Take 1 tablet by mouth 2 (Two) Times a Day., Disp: , Rfl:     Review of Systems    Review of Systems - General ROS: negative  ENT ROS: negative  Respiratory ROS: no cough, shortness of breath, or wheezing  Cardiovascular ROS: no chest pain or dyspnea on exertion  Gastrointestinal ROS: positive for - abdominal pain  Genito-Urinary ROS: no dysuria, trouble voiding, or hematuria  Dermatological ROS: negative   Breast ROS: negative for breast lumps  Hematological and Lymphatic ROS: negative  Musculoskeletal ROS: negative   Neurological ROS: no TIA or stroke symptoms    Psychological ROS: negative  Endocrine ROS: negative    Objective       Vital Signs   /80 (BP Location: Left arm, Patient Position: Sitting, Cuff Size: Adult)   Pulse 86   Ht 167.6 cm (65.98\")   Wt 82.1 kg (181 lb)   LMP 09/01/2024 (Approximate)   SpO2 100%   BMI 29.23 kg/m²      Physical Exam:  General appearance - alert, well appearing, and in no distress  Mental status - alert, oriented to person, place, and time  Eyes - pupils equal and reactive, extraocular eye movements intact  Neck - supple, no significant adenopathy  Chest - no tachypnea, retractions or cyanosis  Heart - normal rate  Abdomen - soft, nontender, nondistended, no masses or organomegaly  Neurological - alert, oriented, normal speech, no focal findings or movement disorder noted  Physical Exam         Results Review:    The following data was reviewed by: Jolly Dia MD on 09/04/2024:    CT Abdomen Pelvis With Contrast (07/15/2024 07:00)   1. No acute abnormality of the abdomen or pelvis. No findings to account  for patient's symptoms.  2. Normal retrocecal appendix. No finding to suggest appendicitis. No  gallstones.  US Gallbladder (02/15/2024 10:06)   1. No acute abnormality is identified, there appears to be a tiny 4 mm  polyp along the posterior wall of the gallbladder, partially " obscured by  some dependent sludge. No gallstones or evidence of cholecystitis.  MRI Abdomen With & Without Contrast (10/30/2023 10:47)   ED Provider Notes by Shannan Baig MD (08/21/2024 07:22)   Progress Notes by Alicia Rankin APRN (08/12/2024 09:30)     Assessment & Plan       Diagnoses and all orders for this visit:    1. Gallbladder sludge (Primary)  -     Case Request; Standing  -     XR chest 1 vw; Future  -     indocyanine green (IC-GREEN) injection 3.75 mg  -     heparin (porcine) 5000 UNIT/ML injection 5,000 Units  -     CBC & Differential; Future  -     Comprehensive Metabolic Panel; Future  -     ECG 12 Lead; Future  -     ceFAZolin (ANCEF) 2,000 mg in sodium chloride 0.9 % 100 mL IVPB  -     Case Request    2. Gallbladder polyp  -     Case Request; Standing  -     XR chest 1 vw; Future  -     indocyanine green (IC-GREEN) injection 3.75 mg  -     heparin (porcine) 5000 UNIT/ML injection 5,000 Units  -     CBC & Differential; Future  -     Comprehensive Metabolic Panel; Future  -     ECG 12 Lead; Future  -     ceFAZolin (ANCEF) 2,000 mg in sodium chloride 0.9 % 100 mL IVPB  -     Case Request    3. Overweight (BMI 25.0-29.9)    Other orders  -     Follow Anesthesia Guidelines / Protocol; Future  -     Follow Anesthesia Guidelines / Protocol; Standing  -     Verify / Perform Chlorhexidine Skin Prep; Standing  -     Obtain Informed Consent; Future  -     Provide NPO Instructions to Patient; Future  -     Chlorhexidine Skin Prep; Future  -     Notify physician (specify); Standing  -     Instructions on coughing, deep breathing, and incentive spirometry.; Standing  -     Oxygen Therapy-; Standing  -     Place Sequential Compression Device; Standing  -     Maintain Sequential Compression Device; Standing  -     POC Urine Pregnancy; Standing         Zena Zheng is a 44 y.o. female with biliary colic 2/2 cholelithiasis. History and imaging above are consistent with this diagnosis. I did   the patient that there is a small chance that cholecystectomy does not completely resolve the pain, and that if this is the case, we will refer the patient to GI for an EGD. However, the symptoms including their nature, timing and duration are most consistent with biliary colic. I reviewed the gallbladder anatomy with the patient, and after a thorough discussion of risks (including bleeding, infection, bile leak, damage to surrounding structures including the common bile duct, and risks of anesthesia) and benefits, the patient wishes to proceed with laparoscopic robotic assisted cholecystectomy, possible cholangiogram. The patient has an appointment for pre-op work up including EKG, CXR, CMP and CBC. The patient is currently scheduled for laparoscopic robotic assisted cholecystectomy, with ICG guided cholangiogram possible fluoroscopy on 10/1/24.     I also discussed with the patient post operative pain management including multimodal pain control utilizing Tylenol, ibuprofen, and Roxicodone for breakthrough pain. I will plan to give the patient 10 tabs of 5mg Roxicodone post operatively for breakthrough pain    Jolly Dia MD  09/04/24  13:13 CDT    Patient or patient representative verbalized consent for the use of Ambient Listening during the visit with  Jolly Dia MD for chart documentation. 9/4/2024  13:13 CDT

## 2024-09-04 NOTE — PATIENT INSTRUCTIONS

## 2024-09-04 NOTE — PROGRESS NOTES
Office New Patient History and Physical:     Referring Provider: Alicia Rankin A*    Chief Complaint   Patient presents with    Follow-up     Evaluation of gallbladder       Subjective .     History of present illness:    History of Present Illness  The patient presents for evaluation of gallbladder issues.    She experiences constant stomach discomfort, which worsens with eating. Additionally, she feels nauseous when she does not eat. The pain is localized in her epigastrium and does not radiate to her right side. She reports no instances of vomiting, fevers, or chills. Bloating has been noticed, leading her to eliminate gluten from her diet. She has no history of abdominal surgery and is not currently taking any blood thinners.     She reports no yellowing of her skin or eyes. Her most recent ultrasound was conducted in 02/2024. She has been under the care of Dr. Ramila Rankin.     She visited the ER on 09/21/2024 due to suspected ovarian cyst pain. She was unaware of a fracture on her left side. She is scheduled for a colonoscopy on 09/19/2024.    SOCIAL HISTORY  She does not smoke.       History  Past Medical History:   Diagnosis Date    Anxiety     BV (bacterial vaginosis)     Depression     Gallbladder sludge 9/4/2024    Hypertension    ,   Past Surgical History:   Procedure Laterality Date    ANKLE SURGERY      ENDOSCOPY N/A 12/04/2023    No endoscopic esophageal abnormality to explain patient's dysphagia. Esophagus dilated. Dilated. - There is no endoscopic evidence of Ingram's esophagus. - Normal stomach. - Normal examined duodenum. - No specimens collecte    TUBAL ABDOMINAL LIGATION     ,   Family History   Problem Relation Age of Onset    Colon polyps Mother     Arthritis Mother     Heart disease Mother     Stroke Father     No Known Problems Sister     No Known Problems Brother     No Known Problems Daughter     No Known Problems Son     No Known Problems Maternal Aunt     No Known Problems  Paternal Aunt     No Known Problems Maternal Grandmother     No Known Problems Paternal Grandmother     BRCA 1/2 Neg Hx     Breast cancer Neg Hx     Colon cancer Neg Hx     Endometrial cancer Neg Hx     Ovarian cancer Neg Hx    ,   Social History     Tobacco Use    Smoking status: Never    Smokeless tobacco: Never   Vaping Use    Vaping status: Never Used   Substance Use Topics    Alcohol use: Not Currently    Drug use: Never   , (Not in a hospital admission)   and Allergies:  Adhesive tape, Amoxicillin, Clindamycin/lincomycin, Doxycycline, Erythromycin, Sulfa antibiotics, and Tape    Current Outpatient Medications:     Azelastine HCl 137 MCG/SPRAY solution, SPRAY 1 TO 2 SPRAYS INTO EACH NOSTRIL TWICE A DAY AS NEEDED, Disp: , Rfl:     brompheniramine-pseudoephedrine-DM 30-2-10 MG/5ML syrup, Take 2.5 mL by mouth 4 (Four) Times a Day As Needed for Allergies., Disp: 118 mL, Rfl: 0    fluticasone (FLONASE) 50 MCG/ACT nasal spray, 1 spray by Each Nare route Daily., Disp: 15.8 mL, Rfl: 11    gabapentin (NEURONTIN) 300 MG capsule, Take 1 capsule by mouth 2 (Two) Times a Day., Disp: 60 capsule, Rfl: 5    INV [employer], AMB, KIT, Pharmacy Label,, Pt states that there are 2 more meds she takes name unknown for acne and rashs, Disp: , Rfl:     lamoTRIgine (LaMICtal) 25 MG tablet, 2 TABLETS NIGHTLY, Disp: , Rfl:     loratadine (Claritin) 10 MG tablet, Take 1 tablet by mouth Daily., Disp: , Rfl:     metoprolol succinate XL (TOPROL-XL) 25 MG 24 hr tablet, Take 1 tablet by mouth Daily., Disp: , Rfl:     metroNIDAZOLE (METROCREAM) 0.75 % cream, , Disp: , Rfl:     mometasone (ELOCON) 0.1 % solution, , Disp: , Rfl:     NON FORMULARY, Pt states that there are 2 more meds she takes name unknown for acne and rashs, Disp: , Rfl:     ondansetron (ZOFRAN) 4 MG tablet, Every 8 (Eight) Hours., Disp: , Rfl:     pimecrolimus (ELIDEL) 1 % cream, APPLY TO FULL FACE EVERY NIGHT, Disp: , Rfl:     tiZANidine (ZANAFLEX) 4 MG tablet, Take 1 tablet by  "mouth 3 (Three) Times a Day., Disp: , Rfl:     tiZANidine (ZANAFLEX) 4 MG tablet, Take 1 tablet by mouth 2 (Two) Times a Day., Disp: , Rfl:     Review of Systems    Review of Systems - General ROS: negative  ENT ROS: negative  Respiratory ROS: no cough, shortness of breath, or wheezing  Cardiovascular ROS: no chest pain or dyspnea on exertion  Gastrointestinal ROS: positive for - abdominal pain  Genito-Urinary ROS: no dysuria, trouble voiding, or hematuria  Dermatological ROS: negative   Breast ROS: negative for breast lumps  Hematological and Lymphatic ROS: negative  Musculoskeletal ROS: negative   Neurological ROS: no TIA or stroke symptoms    Psychological ROS: negative  Endocrine ROS: negative    Objective       Vital Signs   /80 (BP Location: Left arm, Patient Position: Sitting, Cuff Size: Adult)   Pulse 86   Ht 167.6 cm (65.98\")   Wt 82.1 kg (181 lb)   LMP 09/01/2024 (Approximate)   SpO2 100%   BMI 29.23 kg/m²      Physical Exam:  General appearance - alert, well appearing, and in no distress  Mental status - alert, oriented to person, place, and time  Eyes - pupils equal and reactive, extraocular eye movements intact  Neck - supple, no significant adenopathy  Chest - no tachypnea, retractions or cyanosis  Heart - normal rate  Abdomen - soft, nontender, nondistended, no masses or organomegaly  Neurological - alert, oriented, normal speech, no focal findings or movement disorder noted  Physical Exam         Results Review:    The following data was reviewed by: Jolly Dia MD on 09/04/2024:    CT Abdomen Pelvis With Contrast (07/15/2024 07:00)   1. No acute abnormality of the abdomen or pelvis. No findings to account  for patient's symptoms.  2. Normal retrocecal appendix. No finding to suggest appendicitis. No  gallstones.  US Gallbladder (02/15/2024 10:06)   1. No acute abnormality is identified, there appears to be a tiny 4 mm  polyp along the posterior wall of the gallbladder, partially " obscured by  some dependent sludge. No gallstones or evidence of cholecystitis.  MRI Abdomen With & Without Contrast (10/30/2023 10:47)   ED Provider Notes by Shannan Baig MD (08/21/2024 07:22)   Progress Notes by Alicia Rankin APRN (08/12/2024 09:30)     Assessment & Plan       Diagnoses and all orders for this visit:    1. Gallbladder sludge (Primary)  -     Case Request; Standing  -     XR chest 1 vw; Future  -     indocyanine green (IC-GREEN) injection 3.75 mg  -     heparin (porcine) 5000 UNIT/ML injection 5,000 Units  -     CBC & Differential; Future  -     Comprehensive Metabolic Panel; Future  -     ECG 12 Lead; Future  -     ceFAZolin (ANCEF) 2,000 mg in sodium chloride 0.9 % 100 mL IVPB  -     Case Request    2. Gallbladder polyp  -     Case Request; Standing  -     XR chest 1 vw; Future  -     indocyanine green (IC-GREEN) injection 3.75 mg  -     heparin (porcine) 5000 UNIT/ML injection 5,000 Units  -     CBC & Differential; Future  -     Comprehensive Metabolic Panel; Future  -     ECG 12 Lead; Future  -     ceFAZolin (ANCEF) 2,000 mg in sodium chloride 0.9 % 100 mL IVPB  -     Case Request    3. Overweight (BMI 25.0-29.9)    Other orders  -     Follow Anesthesia Guidelines / Protocol; Future  -     Follow Anesthesia Guidelines / Protocol; Standing  -     Verify / Perform Chlorhexidine Skin Prep; Standing  -     Obtain Informed Consent; Future  -     Provide NPO Instructions to Patient; Future  -     Chlorhexidine Skin Prep; Future  -     Notify physician (specify); Standing  -     Instructions on coughing, deep breathing, and incentive spirometry.; Standing  -     Oxygen Therapy-; Standing  -     Place Sequential Compression Device; Standing  -     Maintain Sequential Compression Device; Standing  -     POC Urine Pregnancy; Standing         Zena Zheng is a 44 y.o. female with biliary colic 2/2 cholelithiasis. History and imaging above are consistent with this diagnosis. I did   the patient that there is a small chance that cholecystectomy does not completely resolve the pain, and that if this is the case, we will refer the patient to GI for an EGD. However, the symptoms including their nature, timing and duration are most consistent with biliary colic. I reviewed the gallbladder anatomy with the patient, and after a thorough discussion of risks (including bleeding, infection, bile leak, damage to surrounding structures including the common bile duct, and risks of anesthesia) and benefits, the patient wishes to proceed with laparoscopic robotic assisted cholecystectomy, possible cholangiogram. The patient has an appointment for pre-op work up including EKG, CXR, CMP and CBC. The patient is currently scheduled for laparoscopic robotic assisted cholecystectomy, with ICG guided cholangiogram possible fluoroscopy on 10/1/24.     I also discussed with the patient post operative pain management including multimodal pain control utilizing Tylenol, ibuprofen, and Roxicodone for breakthrough pain. I will plan to give the patient 10 tabs of 5mg Roxicodone post operatively for breakthrough pain    Jolly Dia MD  09/04/24  13:13 CDT    Patient or patient representative verbalized consent for the use of Ambient Listening during the visit with  Jolly Dia MD for chart documentation. 9/4/2024  13:13 CDT

## 2024-09-10 ENCOUNTER — TELEPHONE (OUTPATIENT)
Dept: INTERNAL MEDICINE | Facility: CLINIC | Age: 44
End: 2024-09-10

## 2024-09-10 ENCOUNTER — OFFICE VISIT (OUTPATIENT)
Dept: INTERNAL MEDICINE | Facility: CLINIC | Age: 44
End: 2024-09-10
Payer: COMMERCIAL

## 2024-09-10 VITALS
HEIGHT: 66 IN | HEART RATE: 74 BPM | OXYGEN SATURATION: 98 % | BODY MASS INDEX: 29.65 KG/M2 | DIASTOLIC BLOOD PRESSURE: 82 MMHG | SYSTOLIC BLOOD PRESSURE: 122 MMHG | WEIGHT: 184.5 LBS | RESPIRATION RATE: 16 BRPM

## 2024-09-10 DIAGNOSIS — M79.7 FIBROMYALGIA: ICD-10-CM

## 2024-09-10 DIAGNOSIS — G62.9 NEUROPATHY: ICD-10-CM

## 2024-09-10 DIAGNOSIS — R55 VASOVAGAL SYNCOPE: ICD-10-CM

## 2024-09-10 DIAGNOSIS — M54.42 LOW BACK PAIN WITH BILATERAL SCIATICA, UNSPECIFIED BACK PAIN LATERALITY, UNSPECIFIED CHRONICITY: Primary | ICD-10-CM

## 2024-09-10 DIAGNOSIS — M54.41 LOW BACK PAIN WITH BILATERAL SCIATICA, UNSPECIFIED BACK PAIN LATERALITY, UNSPECIFIED CHRONICITY: Primary | ICD-10-CM

## 2024-09-10 PROCEDURE — 99214 OFFICE O/P EST MOD 30 MIN: CPT | Performed by: INTERNAL MEDICINE

## 2024-09-10 NOTE — PROGRESS NOTES
Chief Complaint   Patient presents with    Follow-up     Neuropathy           History:  Zena Zheng is a 44 y.o. female who presents today for evaluation of the above problems.      HPI  History of Present Illness  The patient presents for evaluation of multiple medical concerns.    She experienced a fainting episode recently after donating plasma. While at [facility], she suddenly felt lightheaded, dizzy, and nauseous. Despite attempting to reach the bathroom, she passed out and vomited on herself. An ambulance was called due to her blackout, but she declined to be taken to the hospital. The EMS team evaluated her and mentioned that she might have bradycardia. She donates plasma approximately once a week and is scheduled for surgery soon, planning to stop donating plasma due to the anticoagulant involved in the process. She feels well today, but this was her fifth fainting episode, with the first one occurring when she was 10 years old.    She has been experiencing back pain, which she describes as a pinched nerve, causing shooting pains down her legs and into her feet when she walks. There is also a constant burning sensation in her lower back that extends into both legs. Yesterday, she experienced shooting pain in both legs and feet, described as stabbing. Additionally, she reports weakness in her left arm and both hands, and occasionally, her legs feel weak and wobbly. She has been under the care of Dr. Last for fibromyalgia. She stopped taking vitamin B12 supplements. She visited an urgent care center for her shoulder pain, suspecting it might be related to her rotator cuff. She had an x-ray of her shoulder about 9 months ago. She also visited the [facility], where they suggested she might have a pinched disc and recommended decompression therapy.    She had COVID-19 a few weeks ago.    ROS:  Review of Systems  See above      Current Outpatient Medications:     Azelastine HCl 137 MCG/SPRAY solution,  SPRAY 1 TO 2 SPRAYS INTO EACH NOSTRIL TWICE A DAY AS NEEDED, Disp: , Rfl:     fluticasone (FLONASE) 50 MCG/ACT nasal spray, 1 spray by Each Nare route Daily., Disp: 15.8 mL, Rfl: 11    gabapentin (NEURONTIN) 300 MG capsule, Take 1 capsule by mouth 2 (Two) Times a Day., Disp: 60 capsule, Rfl: 5    lamoTRIgine (LaMICtal) 25 MG tablet, 2 TABLETS NIGHTLY, Disp: , Rfl:     loratadine (Claritin) 10 MG tablet, Take 1 tablet by mouth Daily., Disp: , Rfl:     metoprolol succinate XL (TOPROL-XL) 25 MG 24 hr tablet, Take 1 tablet by mouth Daily., Disp: , Rfl:     metroNIDAZOLE (METROCREAM) 0.75 % cream, , Disp: , Rfl:     mometasone (ELOCON) 0.1 % solution, , Disp: , Rfl:     NON FORMULARY, Pt states that there are 2 more meds she takes name unknown for acne and rashs, Disp: , Rfl:     ondansetron (ZOFRAN) 4 MG tablet, Every 8 (Eight) Hours., Disp: , Rfl:     pimecrolimus (ELIDEL) 1 % cream, APPLY TO FULL FACE EVERY NIGHT, Disp: , Rfl:     tiZANidine (ZANAFLEX) 4 MG tablet, Take 1 tablet by mouth 2 (Two) Times a Day., Disp: , Rfl:     Lab Results   Component Value Date    GLUCOSE 77 08/21/2024    BUN 10 08/21/2024    CREATININE 0.91 08/21/2024     08/21/2024    K 4.4 08/21/2024     08/21/2024    CALCIUM 9.2 08/21/2024    PROTEINTOT 6.4 08/21/2024    ALBUMIN 4.1 08/21/2024    ALT 8 08/21/2024    AST 12 08/21/2024    ALKPHOS 53 08/21/2024    BILITOT 0.3 08/21/2024    GLOB 2.3 08/21/2024    AGRATIO 1.8 08/21/2024    BCR 11.0 08/21/2024    ANIONGAP 9.0 08/21/2024    EGFR 79.4 08/21/2024       WBC   Date Value Ref Range Status   08/21/2024 10.63 3.40 - 10.80 10*3/mm3 Final   02/21/2023 10.4 4.8 - 10.8 K/uL Final     RBC   Date Value Ref Range Status   08/21/2024 4.54 3.77 - 5.28 10*6/mm3 Final   02/21/2023 4.47 4.20 - 5.40 M/uL Final     Hemoglobin   Date Value Ref Range Status   08/21/2024 13.1 12.0 - 15.9 g/dL Final   02/21/2023 13.5 12.0 - 16.0 g/dL Final     Hematocrit   Date Value Ref Range Status   08/21/2024 41.2  34.0 - 46.6 % Final   02/21/2023 41.9 37.0 - 47.0 % Final     MCV   Date Value Ref Range Status   08/21/2024 90.7 79.0 - 97.0 fL Final   02/21/2023 93.7 81.0 - 99.0 fL Final     MCH   Date Value Ref Range Status   08/21/2024 28.9 26.6 - 33.0 pg Final   02/21/2023 30.2 27.0 - 31.0 pg Final     MCHC   Date Value Ref Range Status   08/21/2024 31.8 31.5 - 35.7 g/dL Final   02/21/2023 32.2 (L) 33.0 - 37.0 g/dL Final     RDW   Date Value Ref Range Status   08/21/2024 13.4 12.3 - 15.4 % Final   02/21/2023 13.2 11.5 - 14.5 % Final     RDW-SD   Date Value Ref Range Status   08/21/2024 45.3 37.0 - 54.0 fl Final     MPV   Date Value Ref Range Status   08/21/2024 10.7 6.0 - 12.0 fL Final   02/21/2023 10.4 9.4 - 12.3 fL Final     Platelets   Date Value Ref Range Status   08/21/2024 351 140 - 450 10*3/mm3 Final   02/21/2023 331 130 - 400 K/uL Final     Neutrophil Rel %   Date Value Ref Range Status   02/21/2023 67.7 (H) 50.0 - 65.0 % Final     Neutrophil %   Date Value Ref Range Status   08/21/2024 65.7 42.7 - 76.0 % Final     Lymphocyte Rel %   Date Value Ref Range Status   02/21/2023 26.2 20.0 - 40.0 % Final     Lymphocyte %   Date Value Ref Range Status   08/21/2024 21.8 19.6 - 45.3 % Final     Monocyte Rel %   Date Value Ref Range Status   02/21/2023 5.4 0.0 - 10.0 % Final     Monocyte %   Date Value Ref Range Status   08/21/2024 9.5 5.0 - 12.0 % Final     Eosinophil Rel %   Date Value Ref Range Status   02/21/2023 0.1 0.0 - 5.0 % Final     Eosinophil %   Date Value Ref Range Status   08/21/2024 2.1 0.3 - 6.2 % Final     Basophil Rel %   Date Value Ref Range Status   02/21/2023 0.4 0.0 - 1.0 % Final     Basophil %   Date Value Ref Range Status   08/21/2024 0.6 0.0 - 1.5 % Final     Immature Grans %   Date Value Ref Range Status   08/21/2024 0.3 0.0 - 0.5 % Final     Neutrophils Absolute   Date Value Ref Range Status   02/21/2023 7.0 1.5 - 7.5 K/uL Final     Neutrophils, Absolute   Date Value Ref Range Status   08/21/2024 6.99  "1.70 - 7.00 10*3/mm3 Final     Lymphocytes Absolute   Date Value Ref Range Status   02/21/2023 2.7 1.1 - 4.5 K/uL Final     Lymphocytes, Absolute   Date Value Ref Range Status   08/21/2024 2.32 0.70 - 3.10 10*3/mm3 Final     Monocytes Absolute   Date Value Ref Range Status   02/21/2023 0.60 0.00 - 0.90 K/uL Final     Monocytes, Absolute   Date Value Ref Range Status   08/21/2024 1.01 (H) 0.10 - 0.90 10*3/mm3 Final     Eosinophils Absolute   Date Value Ref Range Status   02/21/2023 0.00 0.00 - 0.60 K/uL Final     Eosinophils, Absolute   Date Value Ref Range Status   08/21/2024 0.22 0.00 - 0.40 10*3/mm3 Final     Basophils Absolute   Date Value Ref Range Status   02/21/2023 0.00 0.00 - 0.20 K/uL Final     Basophils, Absolute   Date Value Ref Range Status   08/21/2024 0.06 0.00 - 0.20 10*3/mm3 Final     Immature Grans, Absolute   Date Value Ref Range Status   08/21/2024 0.03 0.00 - 0.05 10*3/mm3 Final   02/21/2023 0.0 K/uL Final     nRBC   Date Value Ref Range Status   08/21/2024 0.0 0.0 - 0.2 /100 WBC Final         OBJECTIVE:  Visit Vitals  /82 (BP Location: Left arm, Patient Position: Sitting, Cuff Size: Adult)   Pulse 74   Resp 16   Ht 167.6 cm (65.98\")   Wt 83.7 kg (184 lb 8 oz)   LMP 09/01/2024 (Approximate)   SpO2 98%   BMI 29.80 kg/m²      Physical Exam  Constitutional:       General: She is not in acute distress.     Appearance: She is well-developed. She is not diaphoretic.   HENT:      Head: Normocephalic and atraumatic.   Eyes:      Pupils: Pupils are equal, round, and reactive to light.   Neck:      Thyroid: No thyromegaly.      Trachea: Phonation normal.   Cardiovascular:      Rate and Rhythm: Normal rate and regular rhythm.      Heart sounds: No murmur heard.  Pulmonary:      Effort: Pulmonary effort is normal. No respiratory distress.      Breath sounds: Normal breath sounds. No wheezing or rales.   Musculoskeletal:        Back:    Skin:     Coloration: Skin is not pale.      Findings: No erythema. "   Neurological:      Mental Status: She is alert.   Psychiatric:         Behavior: Behavior normal.         Thought Content: Thought content normal.         Judgment: Judgment normal.           Assessment/Plan      Diagnoses and all orders for this visit:    1. Low back pain with bilateral sciatica, unspecified back pain laterality, unspecified chronicity (Primary)  -     XR Spine Lumbar 2 or 3 View; Future    2. Vasovagal syncope  -     Holter Monitor - 72 Hour Up To 15 Days; Future    3. Neuropathy  -     Vitamin B12; Future  -     Vitamin D,25-Hydroxy; Future      Assessment & Plan  1. Syncope.  The fainting episode may be attributed to vasovagal syncope, possibly triggered by plasma donation. It is recommended to abstain from plasma donation. She recently recovered from COVID-19, which may also contribute to her symptoms.  We will obtain a Zio patch for 10 days.    2. Back Pain.  She reports shooting pains down her legs and into her feet, with a burning sensation in her lower back. An x-ray of her back will be taken. A nerve conduction study may be planned to further investigate the cause of her symptoms. A vitamin B12 level test will be ordered to rule out deficiencies.    3. Shoulder Pain.  She reports ongoing shoulder pain, possibly related to a rotator cuff issue. Records from previous x-rays taken at [facility] will be obtained for further evaluation.    4. Neuropathy.  She experiences neuropathy throughout her body, with significant pain in her lower back and legs. A nerve conduction study may be conducted to assess the extent of nerve damage depending on x-ray results.    I suspect most of her symptoms are related to her chronic fibromyalgia.    Return if symptoms worsen or fail to improve, for Next scheduled follow up.      GILLIAN Nichole MD  08:08 CDT  9/10/2024   Electronically signed      Patient or patient representative verbalized consent for the use of Ambient Listening during the visit with  DARIO  Faraz Nichole MD for chart documentation. 9/10/2024  08:43 CDT

## 2024-09-11 DIAGNOSIS — R55 VASOVAGAL SYNCOPE: Primary | ICD-10-CM

## 2024-09-17 ENCOUNTER — HOSPITAL ENCOUNTER (OUTPATIENT)
Dept: GENERAL RADIOLOGY | Facility: HOSPITAL | Age: 44
Discharge: HOME | End: 2024-09-17
Payer: COMMERCIAL

## 2024-09-17 ENCOUNTER — APPOINTMENT (OUTPATIENT)
Dept: LAB | Facility: HOSPITAL | Age: 44
End: 2024-09-17
Payer: COMMERCIAL

## 2024-09-17 ENCOUNTER — LAB (OUTPATIENT)
Dept: LAB | Facility: HOSPITAL | Age: 44
End: 2024-09-17
Payer: COMMERCIAL

## 2024-09-17 DIAGNOSIS — M51.36 DDD (DEGENERATIVE DISC DISEASE), LUMBAR: Primary | ICD-10-CM

## 2024-09-17 DIAGNOSIS — M54.41 LOW BACK PAIN WITH BILATERAL SCIATICA, UNSPECIFIED BACK PAIN LATERALITY, UNSPECIFIED CHRONICITY: ICD-10-CM

## 2024-09-17 DIAGNOSIS — M41.9 SCOLIOSIS OF LUMBAR SPINE, UNSPECIFIED SCOLIOSIS TYPE: ICD-10-CM

## 2024-09-17 DIAGNOSIS — M54.42 LOW BACK PAIN WITH BILATERAL SCIATICA, UNSPECIFIED BACK PAIN LATERALITY, UNSPECIFIED CHRONICITY: ICD-10-CM

## 2024-09-17 DIAGNOSIS — G62.9 NEUROPATHY: ICD-10-CM

## 2024-09-17 LAB
25(OH)D3 SERPL-MCNC: 26.7 NG/ML (ref 30–100)
VIT B12 BLD-MCNC: 419 PG/ML (ref 211–946)

## 2024-09-17 PROCEDURE — 82306 VITAMIN D 25 HYDROXY: CPT

## 2024-09-17 PROCEDURE — 72100 X-RAY EXAM L-S SPINE 2/3 VWS: CPT

## 2024-09-17 PROCEDURE — 36415 COLL VENOUS BLD VENIPUNCTURE: CPT

## 2024-09-17 PROCEDURE — 82607 VITAMIN B-12: CPT

## 2024-09-19 ENCOUNTER — ANESTHESIA EVENT (OUTPATIENT)
Dept: GASTROENTEROLOGY | Facility: HOSPITAL | Age: 44
End: 2024-09-19
Payer: COMMERCIAL

## 2024-09-19 ENCOUNTER — HOSPITAL ENCOUNTER (OUTPATIENT)
Facility: HOSPITAL | Age: 44
Setting detail: HOSPITAL OUTPATIENT SURGERY
Discharge: HOME | End: 2024-09-19
Attending: INTERNAL MEDICINE | Admitting: INTERNAL MEDICINE
Payer: COMMERCIAL

## 2024-09-19 ENCOUNTER — ANESTHESIA (OUTPATIENT)
Dept: GASTROENTEROLOGY | Facility: HOSPITAL | Age: 44
End: 2024-09-19
Payer: COMMERCIAL

## 2024-09-19 VITALS
HEIGHT: 66 IN | DIASTOLIC BLOOD PRESSURE: 74 MMHG | RESPIRATION RATE: 16 BRPM | HEART RATE: 71 BPM | WEIGHT: 176 LBS | SYSTOLIC BLOOD PRESSURE: 128 MMHG | OXYGEN SATURATION: 99 % | TEMPERATURE: 97.2 F | BODY MASS INDEX: 28.28 KG/M2

## 2024-09-19 DIAGNOSIS — Z12.11 ENCOUNTER FOR SCREENING FOR MALIGNANT NEOPLASM OF COLON: ICD-10-CM

## 2024-09-19 LAB — B-HCG UR QL: NEGATIVE

## 2024-09-19 PROCEDURE — 25010000002 PROPOFOL 1000 MG/100ML EMULSION: Performed by: NURSE ANESTHETIST, CERTIFIED REGISTERED

## 2024-09-19 PROCEDURE — 25810000003 SODIUM CHLORIDE 0.9 % SOLUTION: Performed by: ANESTHESIOLOGY

## 2024-09-19 PROCEDURE — 45385 COLONOSCOPY W/LESION REMOVAL: CPT | Performed by: INTERNAL MEDICINE

## 2024-09-19 PROCEDURE — 88305 TISSUE EXAM BY PATHOLOGIST: CPT | Performed by: INTERNAL MEDICINE

## 2024-09-19 PROCEDURE — 81025 URINE PREGNANCY TEST: CPT | Performed by: ANESTHESIOLOGY

## 2024-09-19 RX ORDER — PROPOFOL 10 MG/ML
INJECTION, EMULSION INTRAVENOUS AS NEEDED
Status: DISCONTINUED | OUTPATIENT
Start: 2024-09-19 | End: 2024-09-19 | Stop reason: SURG

## 2024-09-19 RX ORDER — SODIUM CHLORIDE 9 MG/ML
500 INJECTION, SOLUTION INTRAVENOUS CONTINUOUS PRN
Status: DISCONTINUED | OUTPATIENT
Start: 2024-09-19 | End: 2024-09-19 | Stop reason: HOSPADM

## 2024-09-19 RX ORDER — LIDOCAINE HYDROCHLORIDE 10 MG/ML
0.5 INJECTION, SOLUTION EPIDURAL; INFILTRATION; INTRACAUDAL; PERINEURAL ONCE AS NEEDED
Status: DISCONTINUED | OUTPATIENT
Start: 2024-09-19 | End: 2024-09-19 | Stop reason: HOSPADM

## 2024-09-19 RX ORDER — SODIUM CHLORIDE 0.9 % (FLUSH) 0.9 %
10 SYRINGE (ML) INJECTION AS NEEDED
Status: DISCONTINUED | OUTPATIENT
Start: 2024-09-19 | End: 2024-09-19 | Stop reason: HOSPADM

## 2024-09-19 RX ORDER — LIDOCAINE HYDROCHLORIDE 20 MG/ML
INJECTION, SOLUTION EPIDURAL; INFILTRATION; INTRACAUDAL; PERINEURAL AS NEEDED
Status: DISCONTINUED | OUTPATIENT
Start: 2024-09-19 | End: 2024-09-19 | Stop reason: SURG

## 2024-09-19 RX ADMIN — PROPOFOL 50 MG: 10 INJECTION, EMULSION INTRAVENOUS at 13:47

## 2024-09-19 RX ADMIN — PROPOFOL 50 MG: 10 INJECTION, EMULSION INTRAVENOUS at 13:50

## 2024-09-19 RX ADMIN — SODIUM CHLORIDE 500 ML: 9 INJECTION, SOLUTION INTRAVENOUS at 12:17

## 2024-09-19 RX ADMIN — LIDOCAINE HYDROCHLORIDE 50 MG: 20 INJECTION, SOLUTION EPIDURAL; INFILTRATION; INTRACAUDAL; PERINEURAL at 13:25

## 2024-09-19 RX ADMIN — PROPOFOL 50 MG: 10 INJECTION, EMULSION INTRAVENOUS at 13:43

## 2024-09-19 RX ADMIN — PROPOFOL 75 MG: 10 INJECTION, EMULSION INTRAVENOUS at 13:25

## 2024-09-19 RX ADMIN — PROPOFOL 75 MG: 10 INJECTION, EMULSION INTRAVENOUS at 13:29

## 2024-09-19 RX ADMIN — PROPOFOL 50 MG: 10 INJECTION, EMULSION INTRAVENOUS at 13:39

## 2024-09-19 RX ADMIN — PROPOFOL 50 MG: 10 INJECTION, EMULSION INTRAVENOUS at 13:28

## 2024-09-19 RX ADMIN — PROPOFOL 100 MG: 10 INJECTION, EMULSION INTRAVENOUS at 13:34

## 2024-09-20 LAB
CYTO UR: NORMAL
LAB AP CASE REPORT: NORMAL
Lab: NORMAL
PATH REPORT.FINAL DX SPEC: NORMAL
PATH REPORT.GROSS SPEC: NORMAL

## 2024-09-23 ENCOUNTER — OFFICE VISIT (OUTPATIENT)
Dept: OBGYN CLINIC | Age: 44
End: 2024-09-23
Payer: COMMERCIAL

## 2024-09-23 VITALS
HEIGHT: 66 IN | DIASTOLIC BLOOD PRESSURE: 81 MMHG | SYSTOLIC BLOOD PRESSURE: 120 MMHG | WEIGHT: 184 LBS | HEART RATE: 72 BPM | BODY MASS INDEX: 29.57 KG/M2

## 2024-09-23 DIAGNOSIS — Z12.4 CERVICAL CANCER SCREENING: Primary | ICD-10-CM

## 2024-09-23 DIAGNOSIS — N94.10 DYSPAREUNIA, FEMALE: ICD-10-CM

## 2024-09-23 DIAGNOSIS — Z11.51 ENCOUNTER FOR SCREENING FOR HUMAN PAPILLOMAVIRUS (HPV): ICD-10-CM

## 2024-09-23 DIAGNOSIS — N64.4 BREAST TENDERNESS IN FEMALE: ICD-10-CM

## 2024-09-23 DIAGNOSIS — N89.9 DISORDER OF VAGINA: ICD-10-CM

## 2024-09-23 DIAGNOSIS — Z01.419 WELL WOMAN EXAM WITH ROUTINE GYNECOLOGICAL EXAM: ICD-10-CM

## 2024-09-23 PROBLEM — Z86.0101 HISTORY OF ADENOMATOUS POLYP OF COLON: Status: ACTIVE | Noted: 2024-08-12

## 2024-09-23 PROBLEM — Z86.010 HISTORY OF ADENOMATOUS POLYP OF COLON: Status: ACTIVE | Noted: 2024-08-12

## 2024-09-23 PROCEDURE — 99396 PREV VISIT EST AGE 40-64: CPT | Performed by: NURSE PRACTITIONER

## 2024-09-23 RX ORDER — DEXTROMETHORPHAN HYDROBROMIDE, BUPROPION HYDROCHLORIDE 105; 45 MG/1; MG/1
TABLET, MULTILAYER, EXTENDED RELEASE ORAL
COMMUNITY

## 2024-09-23 ASSESSMENT — ENCOUNTER SYMPTOMS
EYES NEGATIVE: 1
GASTROINTESTINAL NEGATIVE: 1
ALLERGIC/IMMUNOLOGIC NEGATIVE: 1
RESPIRATORY NEGATIVE: 1

## 2024-09-24 ENCOUNTER — HOSPITAL ENCOUNTER (OUTPATIENT)
Dept: GENERAL RADIOLOGY | Facility: HOSPITAL | Age: 44
Discharge: HOME | End: 2024-09-24
Payer: COMMERCIAL

## 2024-09-24 ENCOUNTER — PRE-ADMISSION TESTING (OUTPATIENT)
Dept: PREADMISSION TESTING | Facility: HOSPITAL | Age: 44
End: 2024-09-24
Payer: COMMERCIAL

## 2024-09-24 VITALS
DIASTOLIC BLOOD PRESSURE: 70 MMHG | WEIGHT: 185.19 LBS | BODY MASS INDEX: 29.76 KG/M2 | HEART RATE: 72 BPM | OXYGEN SATURATION: 99 % | HEIGHT: 66 IN | SYSTOLIC BLOOD PRESSURE: 126 MMHG | RESPIRATION RATE: 16 BRPM

## 2024-09-24 DIAGNOSIS — K82.8 GALLBLADDER SLUDGE: ICD-10-CM

## 2024-09-24 DIAGNOSIS — K82.4 GALLBLADDER POLYP: ICD-10-CM

## 2024-09-24 LAB
ALBUMIN SERPL-MCNC: 4.4 G/DL (ref 3.5–5.2)
ALBUMIN/GLOB SERPL: 1.8 G/DL
ALP SERPL-CCNC: 61 U/L (ref 39–117)
ALT SERPL W P-5'-P-CCNC: 9 U/L (ref 1–33)
ANION GAP SERPL CALCULATED.3IONS-SCNC: 10 MMOL/L (ref 5–15)
AST SERPL-CCNC: 12 U/L (ref 1–32)
BASOPHILS # BLD AUTO: 0.06 10*3/MM3 (ref 0–0.2)
BASOPHILS NFR BLD AUTO: 0.6 % (ref 0–1.5)
BILIRUB SERPL-MCNC: 0.3 MG/DL (ref 0–1.2)
BUN SERPL-MCNC: 7 MG/DL (ref 6–20)
BUN/CREAT SERPL: 8.4 (ref 7–25)
CALCIUM SPEC-SCNC: 9.9 MG/DL (ref 8.6–10.5)
CHLORIDE SERPL-SCNC: 105 MMOL/L (ref 98–107)
CO2 SERPL-SCNC: 25 MMOL/L (ref 22–29)
CREAT SERPL-MCNC: 0.83 MG/DL (ref 0.57–1)
DEPRECATED RDW RBC AUTO: 43.5 FL (ref 37–54)
EGFRCR SERPLBLD CKD-EPI 2021: 88.7 ML/MIN/1.73
EOSINOPHIL # BLD AUTO: 0.05 10*3/MM3 (ref 0–0.4)
EOSINOPHIL NFR BLD AUTO: 0.5 % (ref 0.3–6.2)
ERYTHROCYTE [DISTWIDTH] IN BLOOD BY AUTOMATED COUNT: 13.2 % (ref 12.3–15.4)
GLOBULIN UR ELPH-MCNC: 2.5 GM/DL
GLUCOSE SERPL-MCNC: 96 MG/DL (ref 65–99)
HCT VFR BLD AUTO: 39.5 % (ref 34–46.6)
HGB BLD-MCNC: 13.2 G/DL (ref 12–15.9)
IMM GRANULOCYTES # BLD AUTO: 0.02 10*3/MM3 (ref 0–0.05)
IMM GRANULOCYTES NFR BLD AUTO: 0.2 % (ref 0–0.5)
LYMPHOCYTES # BLD AUTO: 2.78 10*3/MM3 (ref 0.7–3.1)
LYMPHOCYTES NFR BLD AUTO: 29.9 % (ref 19.6–45.3)
MCH RBC QN AUTO: 30.1 PG (ref 26.6–33)
MCHC RBC AUTO-ENTMCNC: 33.4 G/DL (ref 31.5–35.7)
MCV RBC AUTO: 90 FL (ref 79–97)
MONOCYTES # BLD AUTO: 0.62 10*3/MM3 (ref 0.1–0.9)
MONOCYTES NFR BLD AUTO: 6.7 % (ref 5–12)
NEUTROPHILS NFR BLD AUTO: 5.78 10*3/MM3 (ref 1.7–7)
NEUTROPHILS NFR BLD AUTO: 62.1 % (ref 42.7–76)
NRBC BLD AUTO-RTO: 0 /100 WBC (ref 0–0.2)
PLATELET # BLD AUTO: 327 10*3/MM3 (ref 140–450)
PMV BLD AUTO: 10.2 FL (ref 6–12)
POTASSIUM SERPL-SCNC: 4.2 MMOL/L (ref 3.5–5.2)
PROT SERPL-MCNC: 6.9 G/DL (ref 6–8.5)
RBC # BLD AUTO: 4.39 10*6/MM3 (ref 3.77–5.28)
SODIUM SERPL-SCNC: 140 MMOL/L (ref 136–145)
WBC NRBC COR # BLD AUTO: 9.31 10*3/MM3 (ref 3.4–10.8)

## 2024-09-24 PROCEDURE — 80053 COMPREHEN METABOLIC PANEL: CPT

## 2024-09-24 PROCEDURE — 36415 COLL VENOUS BLD VENIPUNCTURE: CPT

## 2024-09-24 PROCEDURE — 85025 COMPLETE CBC W/AUTO DIFF WBC: CPT

## 2024-09-24 PROCEDURE — 93005 ELECTROCARDIOGRAM TRACING: CPT

## 2024-09-24 PROCEDURE — 71045 X-RAY EXAM CHEST 1 VIEW: CPT

## 2024-09-26 LAB
HPV HR 12 DNA SPEC QL NAA+PROBE: NOT DETECTED
HPV16 DNA SPEC QL NAA+PROBE: DETECTED
HPV16+18+H RISK 12 DNA SPEC-IMP: ABNORMAL
HPV18 DNA SPEC QL NAA+PROBE: NOT DETECTED

## 2024-09-30 ENCOUNTER — TELEPHONE (OUTPATIENT)
Dept: SURGERY | Facility: CLINIC | Age: 44
End: 2024-09-30
Payer: COMMERCIAL

## 2024-09-30 LAB
QT INTERVAL: 420 MS
QTC INTERVAL: 415 MS

## 2024-09-30 NOTE — TELEPHONE ENCOUNTER
I called patient and reminded her of her arrival time of 5:00 am, and to make sure nothing to eat or drink after midnight.

## 2024-10-01 ENCOUNTER — ANESTHESIA EVENT (OUTPATIENT)
Dept: PERIOP | Facility: HOSPITAL | Age: 44
End: 2024-10-01
Payer: COMMERCIAL

## 2024-10-01 ENCOUNTER — ANESTHESIA (OUTPATIENT)
Dept: PERIOP | Facility: HOSPITAL | Age: 44
End: 2024-10-01
Payer: COMMERCIAL

## 2024-10-01 ENCOUNTER — HOSPITAL ENCOUNTER (OUTPATIENT)
Facility: HOSPITAL | Age: 44
Setting detail: HOSPITAL OUTPATIENT SURGERY
Discharge: HOME | End: 2024-10-01
Attending: STUDENT IN AN ORGANIZED HEALTH CARE EDUCATION/TRAINING PROGRAM | Admitting: STUDENT IN AN ORGANIZED HEALTH CARE EDUCATION/TRAINING PROGRAM
Payer: COMMERCIAL

## 2024-10-01 VITALS
TEMPERATURE: 98 F | OXYGEN SATURATION: 96 % | HEART RATE: 75 BPM | SYSTOLIC BLOOD PRESSURE: 124 MMHG | DIASTOLIC BLOOD PRESSURE: 72 MMHG | RESPIRATION RATE: 16 BRPM

## 2024-10-01 DIAGNOSIS — K82.4 GALLBLADDER POLYP: ICD-10-CM

## 2024-10-01 DIAGNOSIS — K82.8 GALLBLADDER SLUDGE: ICD-10-CM

## 2024-10-01 LAB — B-HCG UR QL: NEGATIVE

## 2024-10-01 PROCEDURE — 25010000002 DEXAMETHASONE PER 1 MG: Performed by: ANESTHESIOLOGY

## 2024-10-01 PROCEDURE — 25810000003 LACTATED RINGERS PER 1000 ML: Performed by: STUDENT IN AN ORGANIZED HEALTH CARE EDUCATION/TRAINING PROGRAM

## 2024-10-01 PROCEDURE — 25010000002 ONDANSETRON PER 1 MG: Performed by: NURSE ANESTHETIST, CERTIFIED REGISTERED

## 2024-10-01 PROCEDURE — 25010000002 CEFAZOLIN PER 500 MG: Performed by: STUDENT IN AN ORGANIZED HEALTH CARE EDUCATION/TRAINING PROGRAM

## 2024-10-01 PROCEDURE — 25010000002 LIDOCAINE 1 % SOLUTION 20 ML VIAL: Performed by: STUDENT IN AN ORGANIZED HEALTH CARE EDUCATION/TRAINING PROGRAM

## 2024-10-01 PROCEDURE — 25010000002 INDOCYANINE GREEN 25 MG RECONSTITUTED SOLUTION: Performed by: STUDENT IN AN ORGANIZED HEALTH CARE EDUCATION/TRAINING PROGRAM

## 2024-10-01 PROCEDURE — 25010000002 DEXAMETHASONE PER 1 MG: Performed by: NURSE ANESTHETIST, CERTIFIED REGISTERED

## 2024-10-01 PROCEDURE — 47563 LAPARO CHOLECYSTECTOMY/GRAPH: CPT | Performed by: STUDENT IN AN ORGANIZED HEALTH CARE EDUCATION/TRAINING PROGRAM

## 2024-10-01 PROCEDURE — 25010000002 FENTANYL CITRATE (PF) 50 MCG/ML SOLUTION: Performed by: NURSE ANESTHETIST, CERTIFIED REGISTERED

## 2024-10-01 PROCEDURE — 25010000002 LIDOCAINE PF 2% 2 % SOLUTION: Performed by: NURSE ANESTHETIST, CERTIFIED REGISTERED

## 2024-10-01 PROCEDURE — 25010000002 BUPIVACAINE 0.5 % SOLUTION 50 ML VIAL: Performed by: STUDENT IN AN ORGANIZED HEALTH CARE EDUCATION/TRAINING PROGRAM

## 2024-10-01 PROCEDURE — 88304 TISSUE EXAM BY PATHOLOGIST: CPT | Performed by: STUDENT IN AN ORGANIZED HEALTH CARE EDUCATION/TRAINING PROGRAM

## 2024-10-01 PROCEDURE — 81025 URINE PREGNANCY TEST: CPT | Performed by: STUDENT IN AN ORGANIZED HEALTH CARE EDUCATION/TRAINING PROGRAM

## 2024-10-01 PROCEDURE — 25010000002 DEXAMETHASONE PER 1 MG: Performed by: STUDENT IN AN ORGANIZED HEALTH CARE EDUCATION/TRAINING PROGRAM

## 2024-10-01 PROCEDURE — 25010000002 PROPOFOL 10 MG/ML EMULSION: Performed by: NURSE ANESTHETIST, CERTIFIED REGISTERED

## 2024-10-01 PROCEDURE — 25010000002 MIDAZOLAM PER 1MG: Performed by: ANESTHESIOLOGY

## 2024-10-01 PROCEDURE — 25010000002 KETOROLAC TROMETHAMINE PER 15 MG: Performed by: NURSE ANESTHETIST, CERTIFIED REGISTERED

## 2024-10-01 PROCEDURE — 25010000002 MORPHINE SULFATE (PF) 2 MG/ML SOLUTION 1 ML CARTRIDGE: Performed by: STUDENT IN AN ORGANIZED HEALTH CARE EDUCATION/TRAINING PROGRAM

## 2024-10-01 PROCEDURE — 25010000002 HEPARIN (PORCINE) PER 1000 UNITS: Performed by: STUDENT IN AN ORGANIZED HEALTH CARE EDUCATION/TRAINING PROGRAM

## 2024-10-01 PROCEDURE — 25010000002 SUGAMMADEX 200 MG/2ML SOLUTION: Performed by: NURSE ANESTHETIST, CERTIFIED REGISTERED

## 2024-10-01 PROCEDURE — 25010000002 BUPIVACAINE (PF) 0.25 % SOLUTION 30 ML VIAL: Performed by: STUDENT IN AN ORGANIZED HEALTH CARE EDUCATION/TRAINING PROGRAM

## 2024-10-01 PROCEDURE — 25810000003 SODIUM CHLORIDE PER 500 ML: Performed by: STUDENT IN AN ORGANIZED HEALTH CARE EDUCATION/TRAINING PROGRAM

## 2024-10-01 PROCEDURE — 25010000002 LIDOCAINE 1% - EPINEPHRINE 1:100000 1 %-1:100000 SOLUTION 20 ML VIAL: Performed by: STUDENT IN AN ORGANIZED HEALTH CARE EDUCATION/TRAINING PROGRAM

## 2024-10-01 PROCEDURE — 47563 LAPARO CHOLECYSTECTOMY/GRAPH: CPT

## 2024-10-01 DEVICE — LARGE LIGATION CLIPS 6 CLIPS/CART
Type: IMPLANTABLE DEVICE | Site: ABDOMEN | Status: FUNCTIONAL
Brand: VAS-Q-CLIP

## 2024-10-01 RX ORDER — SODIUM CHLORIDE 0.9 % (FLUSH) 0.9 %
3-10 SYRINGE (ML) INJECTION AS NEEDED
Status: DISCONTINUED | OUTPATIENT
Start: 2024-10-01 | End: 2024-10-01 | Stop reason: HOSPADM

## 2024-10-01 RX ORDER — SODIUM CHLORIDE, SODIUM LACTATE, POTASSIUM CHLORIDE, CALCIUM CHLORIDE 600; 310; 30; 20 MG/100ML; MG/100ML; MG/100ML; MG/100ML
1000 INJECTION, SOLUTION INTRAVENOUS CONTINUOUS
Status: DISCONTINUED | OUTPATIENT
Start: 2024-10-01 | End: 2024-10-01 | Stop reason: HOSPADM

## 2024-10-01 RX ORDER — IBUPROFEN 600 MG/1
600 TABLET, FILM COATED ORAL EVERY 6 HOURS PRN
Status: DISCONTINUED | OUTPATIENT
Start: 2024-10-01 | End: 2024-10-01 | Stop reason: HOSPADM

## 2024-10-01 RX ORDER — HYDROCODONE BITARTRATE AND ACETAMINOPHEN 10; 325 MG/1; MG/1
1 TABLET ORAL EVERY 4 HOURS PRN
Status: DISCONTINUED | OUTPATIENT
Start: 2024-10-01 | End: 2024-10-01 | Stop reason: HOSPADM

## 2024-10-01 RX ORDER — SODIUM CHLORIDE, SODIUM LACTATE, POTASSIUM CHLORIDE, CALCIUM CHLORIDE 600; 310; 30; 20 MG/100ML; MG/100ML; MG/100ML; MG/100ML
100 INJECTION, SOLUTION INTRAVENOUS CONTINUOUS
Status: DISCONTINUED | OUTPATIENT
Start: 2024-10-01 | End: 2024-10-01 | Stop reason: HOSPADM

## 2024-10-01 RX ORDER — KETOROLAC TROMETHAMINE 30 MG/ML
INJECTION, SOLUTION INTRAMUSCULAR; INTRAVENOUS AS NEEDED
Status: DISCONTINUED | OUTPATIENT
Start: 2024-10-01 | End: 2024-10-01 | Stop reason: SURG

## 2024-10-01 RX ORDER — DROPERIDOL 2.5 MG/ML
0.62 INJECTION, SOLUTION INTRAMUSCULAR; INTRAVENOUS ONCE AS NEEDED
Status: DISCONTINUED | OUTPATIENT
Start: 2024-10-01 | End: 2024-10-01 | Stop reason: HOSPADM

## 2024-10-01 RX ORDER — EPHEDRINE SULFATE 50 MG/ML
INJECTION INTRAVENOUS AS NEEDED
Status: DISCONTINUED | OUTPATIENT
Start: 2024-10-01 | End: 2024-10-01 | Stop reason: SURG

## 2024-10-01 RX ORDER — HEPARIN SODIUM 5000 [USP'U]/ML
5000 INJECTION, SOLUTION INTRAVENOUS; SUBCUTANEOUS EVERY 8 HOURS SCHEDULED
Status: DISCONTINUED | OUTPATIENT
Start: 2024-10-01 | End: 2024-10-01 | Stop reason: HOSPADM

## 2024-10-01 RX ORDER — NALOXONE HCL 0.4 MG/ML
0.4 VIAL (ML) INJECTION AS NEEDED
Status: DISCONTINUED | OUTPATIENT
Start: 2024-10-01 | End: 2024-10-01 | Stop reason: HOSPADM

## 2024-10-01 RX ORDER — ACETAMINOPHEN 325 MG/1
975 TABLET ORAL EVERY 8 HOURS
Start: 2024-10-01 | End: 2025-10-01

## 2024-10-01 RX ORDER — ONDANSETRON 2 MG/ML
INJECTION INTRAMUSCULAR; INTRAVENOUS AS NEEDED
Status: DISCONTINUED | OUTPATIENT
Start: 2024-10-01 | End: 2024-10-01 | Stop reason: SURG

## 2024-10-01 RX ORDER — MIDAZOLAM HYDROCHLORIDE 2 MG/2ML
2 INJECTION, SOLUTION INTRAMUSCULAR; INTRAVENOUS
Status: DISCONTINUED | OUTPATIENT
Start: 2024-10-01 | End: 2024-10-01 | Stop reason: HOSPADM

## 2024-10-01 RX ORDER — LIDOCAINE HYDROCHLORIDE 20 MG/ML
INJECTION, SOLUTION EPIDURAL; INFILTRATION; INTRACAUDAL; PERINEURAL AS NEEDED
Status: DISCONTINUED | OUTPATIENT
Start: 2024-10-01 | End: 2024-10-01 | Stop reason: SURG

## 2024-10-01 RX ORDER — FLUMAZENIL 0.1 MG/ML
0.2 INJECTION INTRAVENOUS AS NEEDED
Status: DISCONTINUED | OUTPATIENT
Start: 2024-10-01 | End: 2024-10-01 | Stop reason: HOSPADM

## 2024-10-01 RX ORDER — IBUPROFEN 200 MG
600 TABLET ORAL EVERY 8 HOURS
Start: 2024-10-01 | End: 2025-10-01

## 2024-10-01 RX ORDER — MAGNESIUM HYDROXIDE 1200 MG/15ML
LIQUID ORAL AS NEEDED
Status: DISCONTINUED | OUTPATIENT
Start: 2024-10-01 | End: 2024-10-01 | Stop reason: HOSPADM

## 2024-10-01 RX ORDER — INDOCYANINE GREEN AND WATER 25 MG
3.75 KIT INJECTION ONCE
Status: COMPLETED | OUTPATIENT
Start: 2024-10-01 | End: 2024-10-01

## 2024-10-01 RX ORDER — LABETALOL HYDROCHLORIDE 5 MG/ML
5 INJECTION, SOLUTION INTRAVENOUS
Status: DISCONTINUED | OUTPATIENT
Start: 2024-10-01 | End: 2024-10-01 | Stop reason: HOSPADM

## 2024-10-01 RX ORDER — SODIUM CHLORIDE 0.9 % (FLUSH) 0.9 %
10 SYRINGE (ML) INJECTION AS NEEDED
Status: DISCONTINUED | OUTPATIENT
Start: 2024-10-01 | End: 2024-10-01 | Stop reason: HOSPADM

## 2024-10-01 RX ORDER — SODIUM CHLORIDE 9 MG/ML
INJECTION, SOLUTION INTRAVENOUS AS NEEDED
Status: DISCONTINUED | OUTPATIENT
Start: 2024-10-01 | End: 2024-10-01 | Stop reason: HOSPADM

## 2024-10-01 RX ORDER — PROPOFOL 10 MG/ML
VIAL (ML) INTRAVENOUS AS NEEDED
Status: DISCONTINUED | OUTPATIENT
Start: 2024-10-01 | End: 2024-10-01 | Stop reason: SURG

## 2024-10-01 RX ORDER — DEXAMETHASONE SODIUM PHOSPHATE 4 MG/ML
4 INJECTION, SOLUTION INTRA-ARTICULAR; INTRALESIONAL; INTRAMUSCULAR; INTRAVENOUS; SOFT TISSUE ONCE AS NEEDED
Status: COMPLETED | OUTPATIENT
Start: 2024-10-01 | End: 2024-10-01

## 2024-10-01 RX ORDER — FENTANYL CITRATE 50 UG/ML
INJECTION, SOLUTION INTRAMUSCULAR; INTRAVENOUS AS NEEDED
Status: DISCONTINUED | OUTPATIENT
Start: 2024-10-01 | End: 2024-10-01 | Stop reason: SURG

## 2024-10-01 RX ORDER — ROCURONIUM BROMIDE 10 MG/ML
INJECTION, SOLUTION INTRAVENOUS AS NEEDED
Status: DISCONTINUED | OUTPATIENT
Start: 2024-10-01 | End: 2024-10-01 | Stop reason: SURG

## 2024-10-01 RX ORDER — LIDOCAINE HYDROCHLORIDE 10 MG/ML
0.5 INJECTION, SOLUTION EPIDURAL; INFILTRATION; INTRACAUDAL; PERINEURAL ONCE AS NEEDED
Status: DISCONTINUED | OUTPATIENT
Start: 2024-10-01 | End: 2024-10-01 | Stop reason: HOSPADM

## 2024-10-01 RX ORDER — SODIUM CHLORIDE 0.9 % (FLUSH) 0.9 %
3 SYRINGE (ML) INJECTION EVERY 12 HOURS SCHEDULED
Status: DISCONTINUED | OUTPATIENT
Start: 2024-10-01 | End: 2024-10-01 | Stop reason: HOSPADM

## 2024-10-01 RX ORDER — ONDANSETRON 2 MG/ML
4 INJECTION INTRAMUSCULAR; INTRAVENOUS ONCE AS NEEDED
Status: DISCONTINUED | OUTPATIENT
Start: 2024-10-01 | End: 2024-10-01 | Stop reason: HOSPADM

## 2024-10-01 RX ORDER — SCOLOPAMINE TRANSDERMAL SYSTEM 1 MG/1
1 PATCH, EXTENDED RELEASE TRANSDERMAL ONCE
Status: DISCONTINUED | OUTPATIENT
Start: 2024-10-01 | End: 2024-10-01 | Stop reason: HOSPADM

## 2024-10-01 RX ORDER — FENTANYL CITRATE 50 UG/ML
50 INJECTION, SOLUTION INTRAMUSCULAR; INTRAVENOUS
Status: DISCONTINUED | OUTPATIENT
Start: 2024-10-01 | End: 2024-10-01 | Stop reason: HOSPADM

## 2024-10-01 RX ORDER — DEXAMETHASONE SODIUM PHOSPHATE 4 MG/ML
INJECTION, SOLUTION INTRA-ARTICULAR; INTRALESIONAL; INTRAMUSCULAR; INTRAVENOUS; SOFT TISSUE AS NEEDED
Status: DISCONTINUED | OUTPATIENT
Start: 2024-10-01 | End: 2024-10-01 | Stop reason: SURG

## 2024-10-01 RX ORDER — HYDROCODONE BITARTRATE AND ACETAMINOPHEN 5; 325 MG/1; MG/1
1 TABLET ORAL EVERY 4 HOURS PRN
Status: DISCONTINUED | OUTPATIENT
Start: 2024-10-01 | End: 2024-10-01 | Stop reason: HOSPADM

## 2024-10-01 RX ORDER — OXYCODONE HYDROCHLORIDE 5 MG/1
5 TABLET ORAL EVERY 8 HOURS PRN
Qty: 10 TABLET | Refills: 0 | Status: SHIPPED | OUTPATIENT
Start: 2024-10-01 | End: 2025-10-01

## 2024-10-01 RX ORDER — SODIUM CHLORIDE 0.9 % (FLUSH) 0.9 %
3 SYRINGE (ML) INJECTION AS NEEDED
Status: DISCONTINUED | OUTPATIENT
Start: 2024-10-01 | End: 2024-10-01 | Stop reason: HOSPADM

## 2024-10-01 RX ORDER — SODIUM CHLORIDE 9 MG/ML
40 INJECTION, SOLUTION INTRAVENOUS AS NEEDED
Status: DISCONTINUED | OUTPATIENT
Start: 2024-10-01 | End: 2024-10-01 | Stop reason: HOSPADM

## 2024-10-01 RX ORDER — ACETAMINOPHEN 500 MG
1000 TABLET ORAL ONCE
Status: COMPLETED | OUTPATIENT
Start: 2024-10-01 | End: 2024-10-01

## 2024-10-01 RX ORDER — ONDANSETRON 4 MG/1
4 TABLET, FILM COATED ORAL EVERY 8 HOURS PRN
Qty: 15 TABLET | Refills: 0 | Status: SHIPPED | OUTPATIENT
Start: 2024-10-01 | End: 2025-10-01

## 2024-10-01 RX ADMIN — MIDAZOLAM HYDROCHLORIDE 2 MG: 1 INJECTION, SOLUTION INTRAMUSCULAR; INTRAVENOUS at 06:37

## 2024-10-01 RX ADMIN — LIDOCAINE HYDROCHLORIDE 100 MG: 20 INJECTION, SOLUTION EPIDURAL; INFILTRATION; INTRACAUDAL; PERINEURAL at 07:01

## 2024-10-01 RX ADMIN — EPHEDRINE SULFATE 20 MG: 50 INJECTION INTRAVENOUS at 07:15

## 2024-10-01 RX ADMIN — SUGAMMADEX 200 MG: 100 INJECTION, SOLUTION INTRAVENOUS at 07:36

## 2024-10-01 RX ADMIN — PROPOFOL 180 MG: 10 INJECTION, EMULSION INTRAVENOUS at 07:01

## 2024-10-01 RX ADMIN — SCOPALAMINE 1 PATCH: 1 PATCH, EXTENDED RELEASE TRANSDERMAL at 06:37

## 2024-10-01 RX ADMIN — FENTANYL CITRATE 150 MCG: 50 INJECTION, SOLUTION INTRAMUSCULAR; INTRAVENOUS at 07:17

## 2024-10-01 RX ADMIN — HEPARIN SODIUM 5000 UNITS: 5000 INJECTION, SOLUTION INTRAVENOUS; SUBCUTANEOUS at 06:34

## 2024-10-01 RX ADMIN — ACETAMINOPHEN 1000 MG: 500 TABLET, FILM COATED ORAL at 06:37

## 2024-10-01 RX ADMIN — DEXAMETHASONE SODIUM PHOSPHATE 4 MG: 4 INJECTION, SOLUTION INTRA-ARTICULAR; INTRALESIONAL; INTRAMUSCULAR; INTRAVENOUS; SOFT TISSUE at 06:37

## 2024-10-01 RX ADMIN — HYDROCODONE BITARTRATE AND ACETAMINOPHEN 1 TABLET: 5; 325 TABLET ORAL at 08:13

## 2024-10-01 RX ADMIN — SODIUM CHLORIDE, POTASSIUM CHLORIDE, SODIUM LACTATE AND CALCIUM CHLORIDE 1000 ML: 600; 310; 30; 20 INJECTION, SOLUTION INTRAVENOUS at 06:17

## 2024-10-01 RX ADMIN — INDOCYANINE GREEN AND WATER 3.75 MG: KIT at 06:34

## 2024-10-01 RX ADMIN — DEXAMETHASONE SODIUM PHOSPHATE 4 MG: 4 INJECTION INTRA-ARTICULAR; INTRALESIONAL; INTRAMUSCULAR; INTRAVENOUS; SOFT TISSUE at 07:05

## 2024-10-01 RX ADMIN — ROCURONIUM 50 MG: 50 INJECTION, SOLUTION INTRAVENOUS at 07:01

## 2024-10-01 RX ADMIN — FENTANYL CITRATE 100 MCG: 50 INJECTION, SOLUTION INTRAMUSCULAR; INTRAVENOUS at 07:01

## 2024-10-01 RX ADMIN — CEFAZOLIN 2000 MG: 2 INJECTION, POWDER, FOR SOLUTION INTRAMUSCULAR; INTRAVENOUS at 07:05

## 2024-10-01 RX ADMIN — KETOROLAC TROMETHAMINE 30 MG: 30 INJECTION, SOLUTION INTRAMUSCULAR; INTRAVENOUS at 07:41

## 2024-10-01 RX ADMIN — ONDANSETRON 4 MG: 2 INJECTION INTRAMUSCULAR; INTRAVENOUS at 07:05

## 2024-10-01 NOTE — ANESTHESIA PREPROCEDURE EVALUATION
Anesthesia Evaluation     Patient summary reviewed   no history of anesthetic complications:   NPO Solid Status: > 8 hours  NPO Liquid Status: > 8 hours           Airway   Mallampati: I  TM distance: >3 FB  No difficulty expected  Dental - normal exam     Pulmonary - negative pulmonary ROS   (-) asthma, sleep apnea, not a smoker  Cardiovascular   Exercise tolerance: good (4-7 METS)    (+) dysrhythmias Tachycardia  (-) past MI, cardiac stents, hyperlipidemia      Neuro/Psych  (+) psychiatric history Anxiety  (-) seizures, TIA, CVA  GI/Hepatic/Renal/Endo    (-) liver disease, no renal disease, diabetes    Musculoskeletal     (+) myalgias  Abdominal    Substance History      OB/GYN          Other                    Anesthesia Plan    ASA 2     general     intravenous induction     Anesthetic plan, risks, benefits, and alternatives have been provided, discussed and informed consent has been obtained with: patient.    CODE STATUS:

## 2024-10-01 NOTE — INTERVAL H&P NOTE
H&P reviewed.  The patient was examined and there are no changes to the H&P within functional limits

## 2024-10-01 NOTE — ANESTHESIA PROCEDURE NOTES
Airway  Urgency: elective    Date/Time: 10/1/2024 7:02 AM  Airway not difficult    General Information and Staff    Patient location during procedure: OR  CRNA/CAA: Gabriel Coulter CRNA    Indications and Patient Condition  Indications for airway management: airway protection    Preoxygenated: yes  Mask difficulty assessment: 1 - vent by mask    Final Airway Details  Final airway type: endotracheal airway      Successful airway: ETT  Cuffed: yes   Successful intubation technique: direct laryngoscopy  Endotracheal tube insertion site: oral  Blade: [place]  Blade size: 2  ETT size (mm): 7.0  Cormack-Lehane Classification: grade I - full view of glottis  Placement verified by: capnometry   Measured from: lips  ETT/EBT  to lips (cm): 21  Number of attempts at approach: 1  Assessment: lips, teeth, and gum same as pre-op and atraumatic intubation

## 2024-10-01 NOTE — DISCHARGE INSTRUCTIONS
Wound:   - you have skin glue on your incisions. Okay to shower tomorrow.   - Leave skin glue in place, it should slowly fall off over 2 weeks   - No swimming/soaking/bathing x 2 weeks to allow incisions to heal.     Activity:   - Activity as tolerated. NO heavy lifting x 2 weeks - no more than 25lb at a time.   - No driving or operating machinery on narcotic pain medication.     Pain medication:   - Take 1000mg of tylenol every 8 hours for 3 days. After three days, take it prn.   - Take 600mg of ibuprofen (motrin) every 8 hours for 3 days. After three days, take it prn.   - You have a prescription for a narcotic. It will be roxicodone 5mg tabs. Take these only as needed after you have taken the tylenol and ibuprofen. If you are taking the roxicodone, make sure to take a stool softener (colace) with it as it can cause constipation.   - The narcotic may make you nauseated, you will have a prescription for zofran in case of nausea.     Follow up:   - make an appointment to see me in 2 weeks  - If you have any concerns before then, call me office at [phone]

## 2024-10-01 NOTE — ANESTHESIA POSTPROCEDURE EVALUATION
Patient: Zena Zheng    Procedure Summary       Date: 10/01/24 Room / Location:  [facility] OR  /  [facility] OR    Anesthesia Start: 0658 Anesthesia Stop: 0749    Procedure: LAPAROSCOPIC ROBOTIC ASSISTED CHOLECYSTECTOMY WTIH PRE-OPERATIVE INDOCYANINE GREEN INJECTION (Abdomen) Diagnosis:       Gallbladder sludge      Gallbladder polyp      (Gallbladder sludge [K82.8])      (Gallbladder polyp [K82.4])    Surgeons: Jolly Dia MD Provider: Gabriel Coulter CRNA    Anesthesia Type: general ASA Status: 2            Anesthesia Type: general    Vitals  Vitals Value Taken Time   /70 10/01/24 0829   Temp 98 °F (36.7 °C) 10/01/24 0817   Pulse 77 10/01/24 0829   Resp 16 10/01/24 0829   SpO2 98 % 10/01/24 0829           Post Anesthesia Care and Evaluation    Patient location during evaluation: PACU  Patient participation: complete - patient participated  Level of consciousness: awake and alert  Pain management: adequate    Airway patency: patent  Anesthetic complications: No anesthetic complications    Cardiovascular status: acceptable  Respiratory status: acceptable  Hydration status: acceptable    Comments: Blood pressure 124/70, pulse 77, temperature 98 °F (36.7 °C), temperature source Temporal, resp. rate 16, last menstrual period 08/30/2024, SpO2 98%, not currently breastfeeding.    Pt discharged from PACU based on pranay score >8

## 2024-10-01 NOTE — OP NOTE
Laparoscopic Robotic-Assisted Cholecystectomy with ICG-guided Cholangiogram Operative Report:     Patient: Zena Zheng MRN: [id number]    YOB: 1980  Age: 44 y.o.  Sex: female   Unit: [facility] OR Room/Bed: [facility] OR/MAIN OR Location: [facility]    Admitting Physician: LAKESHIA RUBIO    Primary Care Physician: DARIO Nichole MD             INDICATIONS: This is a 44 y.o. female who presents with biliary colic as indication for laparoscopic cholecystectomy. After a discussion of risks, benefits and alternatives, consent was obtained.     DATE OF OPERATION: 10/1/2024     Surgeons and Role:     * Lakeshia Rubio MD - Primary  Zenaida Almanza PA-C - assist     ANESTHESIA: General     PREOPERATIVE DIAGNOSIS: Gallbladder sludge [K82.8]  Gallbladder polyp [K82.4]    POSTOPERATIVE DIAGNOSIS: Same    PROCEDURES PERFORMED:  Laparoscopic robotic-assisted cholecystectomy with ICG-guided cholangiogram without fluoroscopy     PROCEDURE DETAILS:   Preoperative ICG, antibiotics and DVT PPX were given. The patient was brought into the OR and placed in the supine position.  General anesthesia was induced.  The patient's abdomen was prepped and draped in the usual sterile fashion.  A timeout was performed verifying the patient and the procedure. An 8 mm incision was made along the left subcostal margin and a Veress needle inserted.  The abdomen was inflated to 15 mmHg with CO2 gas. An 8 mm trocar was placed followed by the robotic camera.  A laparoscopic TAP block was performed with my deep local. Three additional 8 mm trocars were placed in an oblique line from left upper quadrant to right lower quadrant.  The patient was placed in slight reversed Trendelenburg position with right side up. The robot was docked. With the tip up, ProGrasp and hook, the head of the gallbladder was grasped and retracted over the dome of the liver.  The infundibulum was also grasped and retracted to the patient's right side,  opening up the triangle of Calot. The hook was used to open the fat overlying the neck of the gallbladder. The cystic artery and cystic duct were clearly visualized. The critical view of safety was obtained and the confluence of the common bile duct to the common hepatic and cystic ducts was visualized.  The Firefly view was turned on and the anatomy was confirmed with the preoperatively injected ICG.  The cystic duct was double clipped proximally, single clipped distally, and divided. The cystic artery was single clipped proximally, single clipped distally and divided. The gallbladder was removed from the undersurface of the liver with electrocautery.  The gallbladder fossa demonstrated no signs of bleeding or leakage of bile. The robot was undocked.  The left subcostal trocar was removed and the Endo Catch bag was inserted through this incision. The gallbladder was placed in an Endo Catch bag and removed. The abdomen was desufflated and trocars removed. The extraction trocar site fascia was closed with an 0 Vicryl on a UR6.  The skin was closed with 4-0 Monocryl followed by skin glue.  The patient tolerated the procedure well, was extubated in the OR and transferred to the PACU in stable condition    Zenaida Almanza PA-C was responsible for performing the following activities: Retraction, Suturing, Closing, Placing Dressing, and exchanging robotic instruments  and their skilled assistance was necessary for the success of this case.      Findings: distended gallbladder with adhesions to duodenum   Estimated Blood Loss:  10 mL   Complications: none apparent            Specimens: Gallbladder and contents     Disposition: PACU - hemodynamically stable.           Condition: stable    Jolly Dia MD  10/01/24

## 2024-10-03 ENCOUNTER — TELEPHONE (OUTPATIENT)
Dept: SURGERY | Facility: CLINIC | Age: 44
End: 2024-10-03
Payer: COMMERCIAL

## 2024-10-03 NOTE — TELEPHONE ENCOUNTER
Post OP phone call visit:    Type of surgery: Laparoscopic robotic assisted cholecystectomy.   How are you feeling? Doing pretty good.   Are you having any pain or Nausea? Some pain. Taking Tylenol and Ibuprofen. No nausea.   Do you have a normal appetite? Good.  Are you passing gas and having any BM? Having BMs.   How is your activity? Good.  Any drainage or fever? No redness. No drainage. No fever. Some bruising

## 2024-10-17 ENCOUNTER — OFFICE VISIT (OUTPATIENT)
Dept: SURGERY | Facility: CLINIC | Age: 44
End: 2024-10-17
Payer: COMMERCIAL

## 2024-10-17 ENCOUNTER — TELEPHONE (OUTPATIENT)
Dept: OBGYN CLINIC | Age: 44
End: 2024-10-17

## 2024-10-17 VITALS
SYSTOLIC BLOOD PRESSURE: 122 MMHG | WEIGHT: 185 LBS | HEIGHT: 66 IN | DIASTOLIC BLOOD PRESSURE: 76 MMHG | BODY MASS INDEX: 29.73 KG/M2

## 2024-10-17 DIAGNOSIS — Z90.49 S/P LAPAROSCOPIC CHOLECYSTECTOMY: Primary | ICD-10-CM

## 2024-10-17 DIAGNOSIS — K82.4 GALLBLADDER POLYP: ICD-10-CM

## 2024-10-17 PROCEDURE — 99024 POSTOP FOLLOW-UP VISIT: CPT

## 2024-10-17 NOTE — TELEPHONE ENCOUNTER
Roxann returned a call to the office, she advised she is to r/s biopsy with provider please.    Thank you.

## 2024-10-17 NOTE — PROGRESS NOTES
"Patient: Zena Zheng    YOB: 1980    Date: 10/17/2024    Primary Care Provider: DARIO Nichole MD    Vital Signs:   Vitals:    10/17/24 0819   BP: 122/76   BP Location: Left arm   Patient Position: Sitting   Cuff Size: Large Adult   Weight: 83.9 kg (185 lb)   Height: 168 cm (66.14\")       Overall doing well s/p robotic assisted laparoscopic cholecystectomy by Dr. Dia on 10/1/24. No fevers. Symptoms improved. Tolerating diet. Energy improving. Pain improving. Bowels moving ok. Sclera anicteric. Wounds healing well. No significant abdominal tenderness on exam. No evidence of jaundice or scleral icterus.     Results Review:   Pathology and operative findings reviewed with patient.    Tissue Pathology Exam (10/01/2024 07:31)   Gallbladder, cholecystectomy:  A.  Chronic cholecystitis, mild (acalculous).  B.  Cholesterolosis.  C.  No histologic evidence of malignancy.    Assessment / Plan:    Diagnoses and all orders for this visit:    1. S/P laparoscopic cholecystectomy (Primary)    2. Gallbladder polyp        Zena Zheng is 2 weeks s/p laparoscopic cholecystectomy. She is overall doing well. At this time, she is able to return to normal activity as tolerated. She voiced understanding of this. She will call for an appointment if she has any new problems or concerns. She is agreeable to the plan.     Follow up:     Return if symptoms worsen or fail to improve.        Electronically signed by Zenaida Almanza PA-C  10/17/24  08:26 CDT                  "

## 2024-10-17 NOTE — LETTER
October 17, 2024     Patient: Zena Zheng   YOB: 1980   Date of Visit: 10/17/2024       To Whom It May Concern:    It is my medical opinion that Zena Zheng may return to full duty immediately with no restrictions. If you have any questions, feel free to contact our office.       Sincerely,        Zenaida Almanza PA-C    CC: No Recipients

## 2024-10-18 ENCOUNTER — HOSPITAL ENCOUNTER (EMERGENCY)
Facility: HOSPITAL | Age: 44
Discharge: HOME | End: 2024-10-18
Attending: EMERGENCY MEDICINE
Payer: COMMERCIAL

## 2024-10-18 ENCOUNTER — APPOINTMENT (OUTPATIENT)
Dept: GENERAL RADIOLOGY | Facility: HOSPITAL | Age: 44
End: 2024-10-18
Payer: COMMERCIAL

## 2024-10-18 VITALS
HEART RATE: 85 BPM | HEIGHT: 66 IN | BODY MASS INDEX: 29.73 KG/M2 | OXYGEN SATURATION: 100 % | RESPIRATION RATE: 21 BRPM | SYSTOLIC BLOOD PRESSURE: 127 MMHG | TEMPERATURE: 98.5 F | DIASTOLIC BLOOD PRESSURE: 82 MMHG | WEIGHT: 185 LBS

## 2024-10-18 DIAGNOSIS — R00.2 PALPITATIONS: Primary | ICD-10-CM

## 2024-10-18 LAB
ALBUMIN SERPL-MCNC: 4.2 G/DL (ref 3.5–5.2)
ALBUMIN/GLOB SERPL: 1.6 G/DL
ALP SERPL-CCNC: 61 U/L (ref 39–117)
ALT SERPL W P-5'-P-CCNC: 12 U/L (ref 1–33)
AMPHET+METHAMPHET UR QL: NEGATIVE
AMPHETAMINES UR QL: NEGATIVE
ANION GAP SERPL CALCULATED.3IONS-SCNC: 11 MMOL/L (ref 5–15)
AST SERPL-CCNC: 15 U/L (ref 1–32)
BARBITURATES UR QL SCN: NEGATIVE
BASOPHILS # BLD AUTO: 0.07 10*3/MM3 (ref 0–0.2)
BASOPHILS NFR BLD AUTO: 0.8 % (ref 0–1.5)
BENZODIAZ UR QL SCN: NEGATIVE
BILIRUB SERPL-MCNC: 0.2 MG/DL (ref 0–1.2)
BUN SERPL-MCNC: 8 MG/DL (ref 6–20)
BUN/CREAT SERPL: 10.8 (ref 7–25)
BUPRENORPHINE SERPL-MCNC: NEGATIVE NG/ML
CALCIUM SPEC-SCNC: 9.4 MG/DL (ref 8.6–10.5)
CANNABINOIDS SERPL QL: NEGATIVE
CHLORIDE SERPL-SCNC: 103 MMOL/L (ref 98–107)
CO2 SERPL-SCNC: 24 MMOL/L (ref 22–29)
COCAINE UR QL: NEGATIVE
CREAT SERPL-MCNC: 0.74 MG/DL (ref 0.57–1)
D DIMER PPP FEU-MCNC: 0.74 MCGFEU/ML (ref 0–0.5)
DEPRECATED RDW RBC AUTO: 42.8 FL (ref 37–54)
EGFRCR SERPLBLD CKD-EPI 2021: 101.8 ML/MIN/1.73
EOSINOPHIL # BLD AUTO: 0.09 10*3/MM3 (ref 0–0.4)
EOSINOPHIL NFR BLD AUTO: 1 % (ref 0.3–6.2)
ERYTHROCYTE [DISTWIDTH] IN BLOOD BY AUTOMATED COUNT: 13 % (ref 12.3–15.4)
FENTANYL UR-MCNC: NEGATIVE NG/ML
GLOBULIN UR ELPH-MCNC: 2.7 GM/DL
GLUCOSE SERPL-MCNC: 104 MG/DL (ref 65–99)
HCG SERPL QL: NEGATIVE
HCT VFR BLD AUTO: 39.4 % (ref 34–46.6)
HGB BLD-MCNC: 13.1 G/DL (ref 12–15.9)
IMM GRANULOCYTES # BLD AUTO: 0.02 10*3/MM3 (ref 0–0.05)
IMM GRANULOCYTES NFR BLD AUTO: 0.2 % (ref 0–0.5)
LYMPHOCYTES # BLD AUTO: 2.55 10*3/MM3 (ref 0.7–3.1)
LYMPHOCYTES NFR BLD AUTO: 27.9 % (ref 19.6–45.3)
MAGNESIUM SERPL-MCNC: 1.9 MG/DL (ref 1.6–2.6)
MCH RBC QN AUTO: 29.8 PG (ref 26.6–33)
MCHC RBC AUTO-ENTMCNC: 33.2 G/DL (ref 31.5–35.7)
MCV RBC AUTO: 89.5 FL (ref 79–97)
METHADONE UR QL SCN: NEGATIVE
MONOCYTES # BLD AUTO: 0.63 10*3/MM3 (ref 0.1–0.9)
MONOCYTES NFR BLD AUTO: 6.9 % (ref 5–12)
NEUTROPHILS NFR BLD AUTO: 5.77 10*3/MM3 (ref 1.7–7)
NEUTROPHILS NFR BLD AUTO: 63.2 % (ref 42.7–76)
NRBC BLD AUTO-RTO: 0 /100 WBC (ref 0–0.2)
OPIATES UR QL: NEGATIVE
OXYCODONE UR QL SCN: POSITIVE
PCP UR QL SCN: NEGATIVE
PLATELET # BLD AUTO: 361 10*3/MM3 (ref 140–450)
PMV BLD AUTO: 10.4 FL (ref 6–12)
POTASSIUM SERPL-SCNC: 4.1 MMOL/L (ref 3.5–5.2)
PROT SERPL-MCNC: 6.9 G/DL (ref 6–8.5)
RBC # BLD AUTO: 4.4 10*6/MM3 (ref 3.77–5.28)
SODIUM SERPL-SCNC: 138 MMOL/L (ref 136–145)
T4 FREE SERPL-MCNC: 0.88 NG/DL (ref 0.93–1.7)
TRICYCLICS UR QL SCN: NEGATIVE
TSH SERPL DL<=0.05 MIU/L-ACNC: 1.01 UIU/ML (ref 0.27–4.2)
WBC NRBC COR # BLD AUTO: 9.13 10*3/MM3 (ref 3.4–10.8)

## 2024-10-18 PROCEDURE — 84439 ASSAY OF FREE THYROXINE: CPT | Performed by: EMERGENCY MEDICINE

## 2024-10-18 PROCEDURE — 93005 ELECTROCARDIOGRAM TRACING: CPT

## 2024-10-18 PROCEDURE — 80307 DRUG TEST PRSMV CHEM ANLYZR: CPT | Performed by: EMERGENCY MEDICINE

## 2024-10-18 PROCEDURE — 80050 GENERAL HEALTH PANEL: CPT | Performed by: EMERGENCY MEDICINE

## 2024-10-18 PROCEDURE — 84703 CHORIONIC GONADOTROPIN ASSAY: CPT | Performed by: EMERGENCY MEDICINE

## 2024-10-18 PROCEDURE — 71045 X-RAY EXAM CHEST 1 VIEW: CPT

## 2024-10-18 PROCEDURE — 99284 EMERGENCY DEPT VISIT MOD MDM: CPT

## 2024-10-18 PROCEDURE — 83735 ASSAY OF MAGNESIUM: CPT | Performed by: EMERGENCY MEDICINE

## 2024-10-18 PROCEDURE — 36415 COLL VENOUS BLD VENIPUNCTURE: CPT

## 2024-10-18 PROCEDURE — 85379 FIBRIN DEGRADATION QUANT: CPT | Performed by: EMERGENCY MEDICINE

## 2024-10-18 NOTE — ED PROVIDER NOTES
Subjective   History of Present Illness  Patient came in with palpitations she has had these episodes in the past also and is under process of getting some workup performed for that.  Denies any chest pain or nausea vomiting denies any shortness of breath or fever.  She has no headaches associate with this.  No focal neurological deficits.    Palpitations  Palpitations quality:  Regular  Onset quality:  Gradual  Duration:  6 weeks  Timing:  Intermittent  Progression:  Waxing and waning  Chronicity:  Recurrent  Context: anxiety    Context: not blood loss, not dehydration, not exercise, not illicit drugs and not nicotine    Relieved by:  Nothing  Worsened by:  Nothing  Ineffective treatments:  None tried  Associated symptoms: no back pain, no chest pain, no chest pressure, no cough, no diaphoresis, no dizziness, no hemoptysis, no leg pain, no lower extremity edema, no malaise/fatigue, no nausea, no near-syncope, no numbness, no PND, no shortness of breath, no syncope and no weakness    Risk factors: stress    Risk factors: no diabetes mellitus, no heart disease, no hx of atrial fibrillation, no hx of PE, no hx of thyroid disease and no hypercoagulable state        Review of Systems   Constitutional: Negative.  Negative for diaphoresis and malaise/fatigue.   HENT: Negative.     Eyes: Negative.    Respiratory: Negative.  Negative for cough, hemoptysis and shortness of breath.    Cardiovascular: Negative.  Positive for palpitations. Negative for chest pain, syncope, PND and near-syncope.   Gastrointestinal: Negative.  Negative for nausea.   Musculoskeletal: Negative.  Negative for back pain and neck pain.   Skin: Negative.    Neurological: Negative.  Negative for dizziness, weakness and numbness.   All other systems reviewed and are negative.      Past Medical History:   Diagnosis Date    Anxiety     BV (bacterial vaginosis)     Depression     Fibromyalgia     Gallbladder sludge 09/04/2024    Hypertension     Neuropathy      Tachycardia        Allergies   Allergen Reactions    Adhesive Tape Rash    Amoxicillin Rash     Beta lactam allergy details  Antibiotic reaction: rash, hives  Age at reaction: adult  Dose to reaction time: (!) minutes  Reason for antibiotic: other  Epinephrine required for reaction?: no  Tolerated antibiotics: unknown        Clindamycin/Lincomycin Rash    Doxycycline Hives and Itching    Erythromycin Hives and Itching    Sulfa Antibiotics Hives and Itching    Tape Rash       Past Surgical History:   Procedure Laterality Date    ANKLE SURGERY      CHOLECYSTECTOMY N/A 10/1/2024    Procedure: LAPAROSCOPIC ROBOTIC ASSISTED CHOLECYSTECTOMY WTIH PRE-OPERATIVE INDOCYANINE GREEN INJECTION;  Surgeon: Jolly Dia MD;  Location: [facility] OR;  Service: Robotics - DaVinci;  Laterality: N/A;    COLONOSCOPY N/A 09/19/2024    Procedure: COLONOSCOPY WITH ANESTHESIA;  Surgeon: Rosa Anne MD;  Location: [facility] ENDOSCOPY;  Service: Gastroenterology;  Laterality: N/A;  pre screening  post polyp  pcp DARIO Nichole MD    DILATATION AND CURETTAGE      ENDOSCOPY N/A 12/04/2023    No endoscopic esophageal abnormality to explain patient's dysphagia. Esophagus dilated. Dilated. - There is no endoscopic evidence of Ingram's esophagus. - Normal stomach. - Normal examined duodenum. - No specimens collecte    TUBAL ABDOMINAL LIGATION         Family History   Problem Relation Age of Onset    Colon polyps Mother         < 60 years old    Arthritis Mother     Heart disease Mother     Stroke Father     No Known Problems Sister     No Known Problems Brother     No Known Problems Maternal Grandmother     No Known Problems Paternal Grandmother     No Known Problems Daughter     No Known Problems Son     No Known Problems Maternal Aunt     No Known Problems Paternal Aunt     BRCA 1/2 Neg Hx     Breast cancer Neg Hx     Colon cancer Neg Hx     Endometrial cancer Neg Hx     Ovarian cancer Neg Hx        Social History     Socioeconomic  History    Marital status: Single   Tobacco Use    Smoking status: Never    Smokeless tobacco: Never   Vaping Use    Vaping status: Never Used   Substance and Sexual Activity    Alcohol use: Not Currently    Drug use: Never    Sexual activity: Yes     Partners: Male     Birth control/protection: Surgical           Objective   Physical Exam  Vitals and nursing note reviewed. Exam conducted with a chaperone present.   Constitutional:       General: She is not in acute distress.     Appearance: Normal appearance.   HENT:      Head: Normocephalic.      Mouth/Throat:      Mouth: Mucous membranes are moist.   Eyes:      Pupils: Pupils are equal, round, and reactive to light.   Cardiovascular:      Rate and Rhythm: Normal rate. No extrasystoles are present.     Chest Wall: PMI is not displaced.      Pulses: Normal pulses.      Heart sounds: Normal heart sounds. No murmur heard.     No gallop.   Pulmonary:      Effort: Pulmonary effort is normal. No tachypnea, respiratory distress or retractions.      Breath sounds: No stridor or decreased air movement. No decreased breath sounds or rales.   Chest:      Chest wall: No tenderness.   Abdominal:      General: Abdomen is flat. Bowel sounds are normal. There is no distension or abdominal bruit.      Palpations: Abdomen is soft. There is no mass or pulsatile mass.      Tenderness: There is no abdominal tenderness. There is no guarding.   Musculoskeletal:         General: No swelling.      Cervical back: Normal range of motion.      Right lower leg: No edema.      Left lower leg: No edema.   Skin:     General: Skin is warm.      Capillary Refill: Capillary refill takes less than 2 seconds.      Coloration: Skin is not cyanotic, jaundiced, mottled or pale.   Neurological:      General: No focal deficit present.      Mental Status: She is alert and oriented to person, place, and time. Mental status is at baseline.      Cranial Nerves: No cranial nerve deficit.      Sensory: No  sensory deficit.      Motor: No weakness.      Coordination: Coordination normal.   Psychiatric:         Mood and Affect: Mood normal.         Thought Content: Thought content normal.         Procedures           ED Course  ED Course as of 10/18/24 1008   Fri Oct 18, 2024   0948 nsr [TS]   1003 EKG shows within normal sinus rhythm lab work essentially unremarkable unremarkable.  There is no hypothyroidism.  Heart rate is 87 at this time.  She already has had a Holter monitor scheduled for her unguinal get a 2D echo and have her follow-up with the primary MD and return there for any worsening symptoms. [TS]      ED Course User Index  [TS] Glen Tinoco MD                                             Medical Decision Making  Patient with palpitation EKG is can be obtained lab workup is can be obtained.  Thyroid profile is going performed.  And chemical profile performed.    Problems Addressed:  Palpitations: chronic illness or injury with exacerbation, progression, or side effects of treatment    Amount and/or Complexity of Data Reviewed  Labs: ordered.     Details: Labs reviewed  Radiology: ordered.  ECG/medicine tests:      Details: Normal sinus rhythm    Risk  Risk Details: PERC score 0  Years Algorithm for Pulmonary Embolus  To be used in hemodynamically stable patient >18 years old    No signs of DVT are present, there is no hemoptysis, PE is not the most likely diagnosis and the D-dimer is not greater or equal to 1000 ng/mL. Therefore PE is excluded . The Years algorithm rules out PE (0.43 % with symptomatic VTE during 3-month follow-up)    Patient came in with palpitations and low clinical suspicion of hyperthyroidism negative neck exam and no clear pharmacological evidence of hyperthyroidism.  No clinical evidence of abnormal lab workup.  Low clinical suspicion of a PE with low PERC score and years algorithm is negative low clinical suspicion of metabolic abnormality with normal lab workup.  There is no  history of stimulant use.        Final diagnoses:   Palpitations       ED Disposition  ED Disposition       ED Disposition   Discharge    Condition   Stable    Comment   --               No follow-up provider specified.       Medication List      No changes were made to your prescriptions during this visit.            Glen Tinoco MD  10/18/24 9520

## 2024-10-18 NOTE — ED NOTES
"Pt had EKG for cc of \"heart racing\" in triage. Pt was brought to room 20 and placed in a hospital gown with monitor leads, BP cuff, and O2 placed on finger. EKG shown NSR rate of 89. Pt call light placed within reach and primary nurse notified of pt arrival.   "

## 2024-10-18 NOTE — Clinical Note
[facility] EMERGENCY DEPARTMENT  [address]  [facility] [address]  Phone: [phone]    Zena Zheng was seen and treated in our emergency department on 10/18/2024.  She may return to work on 10/19/2024.         Thank you for choosing [facility].    Avis Roblero RN

## 2024-10-18 NOTE — DISCHARGE INSTRUCTIONS
It was very nice to meet you, Zena. Thank you for allowing us to take care of you today at [facility].     Today you were seen in the emergency department for your symptoms. Please understand that an ER evaluation is just the start of your evaluation. We do the best we can, but we are often unable to fully find what is causing your symptoms from one evaluation.  Because of this, the goal is to determine whether you need to be evaluated in the hospital or if it is safe for you to go home and see other doctors provided such as primary care physicians or specialist on an outpatient basis.      Like we discussed, I strongly urge that you follow up with your primary care doctor. Please call their office to set up an appointment as soon as possible so that you can be re-evaluated for improvement in your symptoms or for any other questions.  I have provided the information needed, including phone number, to call to set up an appointment below in these discharge papers.      Educational material has also been provided in the following pages regarding what we have discussed today.  Have called in prescription for 2D echo and a Holter monitor for you.     Please return to the emergency room within 12-48 hours if you experience symptoms such as the following:   Fever, chills, chest pain or shortness of breath, pain with inspiration/expiration, pain that travels to your arms, neck or back, nausea, vomiting, severe headache, tearing pain in your chest, dizziness, feel as though you are about to pass out, OR if you have any worsening symptoms, or any other concerns.       Alyce Marin MD

## 2024-10-21 LAB
QT INTERVAL: 374 MS
QTC INTERVAL: 455 MS

## 2024-10-22 ENCOUNTER — OFFICE VISIT (OUTPATIENT)
Dept: INTERNAL MEDICINE | Facility: CLINIC | Age: 44
End: 2024-10-22
Payer: COMMERCIAL

## 2024-10-22 ENCOUNTER — HOSPITAL ENCOUNTER (OUTPATIENT)
Dept: PHYSICAL THERAPY | Facility: HOSPITAL | Age: 44
Setting detail: THERAPIES SERIES
Discharge: HOME | End: 2024-10-22
Payer: COMMERCIAL

## 2024-10-22 VITALS
DIASTOLIC BLOOD PRESSURE: 78 MMHG | WEIGHT: 187 LBS | OXYGEN SATURATION: 99 % | HEART RATE: 90 BPM | HEIGHT: 66 IN | SYSTOLIC BLOOD PRESSURE: 118 MMHG | BODY MASS INDEX: 30.05 KG/M2

## 2024-10-22 DIAGNOSIS — M54.17 RADICULOPATHY OF LUMBOSACRAL REGION: ICD-10-CM

## 2024-10-22 DIAGNOSIS — R41.89 BRAIN FOG: ICD-10-CM

## 2024-10-22 DIAGNOSIS — H53.9 VISUAL DISTURBANCE: Primary | ICD-10-CM

## 2024-10-22 DIAGNOSIS — R00.2 PALPITATIONS: ICD-10-CM

## 2024-10-22 DIAGNOSIS — M54.50 LUMBAR BACK PAIN: Primary | ICD-10-CM

## 2024-10-22 DIAGNOSIS — M25.50 MULTIPLE JOINT PAIN: ICD-10-CM

## 2024-10-22 PROCEDURE — 99213 OFFICE O/P EST LOW 20 MIN: CPT

## 2024-10-22 PROCEDURE — 97162 PT EVAL MOD COMPLEX 30 MIN: CPT

## 2024-10-22 NOTE — THERAPY EVALUATION
Physical Therapy Initial Evaluation and Plan of Care  [facility]  A department of [place] [address] [place], [place] [address]    Patient: Zena Zheng               : 1980  Visit Date: 10/23/2024  Referring practitioner: DARIO Nichole MD  Date of Initial Visit: 10/22/2024    Visit Diagnoses:    ICD-10-CM ICD-9-CM   1. Lumbar back pain  M54.50 724.2   2. Radiculopathy of lumbosacral region  M54.17 724.4     Past Medical History:   Diagnosis Date    Anxiety     BV (bacterial vaginosis)     Depression     Fibromyalgia     Fibromyalgia, primary 2019    Gallbladder sludge 2024    Headache     Hypertension     Neuropathy     Scoliosis 2024    Tachycardia      Past Surgical History:   Procedure Laterality Date    ANKLE SURGERY      CHOLECYSTECTOMY N/A 10/1/2024    Procedure: LAPAROSCOPIC ROBOTIC ASSISTED CHOLECYSTECTOMY WTIH PRE-OPERATIVE INDOCYANINE GREEN INJECTION;  Surgeon: Jolly Dia MD;  Location: [facility] OR;  Service: Robotics - DaVinci;  Laterality: N/A;    COLONOSCOPY N/A 2024    Procedure: COLONOSCOPY WITH ANESTHESIA;  Surgeon: Rosa Anne MD;  Location: [facility] ENDOSCOPY;  Service: Gastroenterology;  Laterality: N/A;  pre screening  post polyp  pcp DARIO Nichole MD    DILATATION AND CURETTAGE      ENDOSCOPY N/A 2023    No endoscopic esophageal abnormality to explain patient's dysphagia. Esophagus dilated. Dilated. - There is no endoscopic evidence of Ingram's esophagus. - Normal stomach. - Normal examined duodenum. - No specimens collecte    TUBAL ABDOMINAL LIGATION           SUBJECTIVE     Subjective Evaluation    History of Present Illness  Mechanism of injury: Pt reports that her back pain started a couple of years ago when she was moving stuff from house to house and had hurt her back then; however she has had chronic back pain for years. Pt reports a hx of sciatic nerve pain, neuropathy, and fibromyalgia.  Her referral mentions scoliosis as well. She reports having shoulder pain in her LUE that sometimes impairs her more than her back and hips      Patient Occupation: Floor deputy at [employer]   Precautions and Work Restrictions: NAQuality of life: good    Pain  Current pain ratin (L low back doen to knee)  At best pain ratin  At worst pain ratin  Location: L low back doen to knee  Quality: burning, dull ache, needle-like and sharp (nerve pain is buring and needles; dull pain in general, sharp pain with certain movements)  Relieving factors: rest and heat (stretching)  Aggravating factors: standing and stairs (job related tasks; lifting from forward flexed position)  Progression: worsening    Social Support  Lives in: trailer  Lives with: young children (9 year old)    Diagnostic Tests  X-ray: abnormal (IMPRESSION: 1. Mild scoliosis. 2. Mild to moderate degenerative endplate spurring within the upper lumbar spine. 3. No acute bony abnormality.)    Treatments  Previous treatment: chiropractic  Patient Goals  Patient goals for therapy: decreased pain  Patient goal: Able to perform better at to job       Outcome Measure:   Modified Oswestry low back pain disability questionnaire:        OBJECTIVE     Objective   Spine ROM     ROM  Pain / laterality    LUMBAR     Flexion  WNL Pain in central low back   Extension  WNL Sharp shooting pain with repeated ext   L lateral flexion  WNL Increase in pain L upper buttcheek and R hip increasing joint pain    R lateral flexion  WNL R hip pain    L Rotation  75% no pain    R Rotation  75% no pain     *pain      Lower Extremity MMT and ROM    LEFT RIGHT   HIP Strength ROM Strength R ROM   flexion 4*  3+ *    extension  15  13*   ABD 4*  4+*    ADD        IR       ER              KNEE       flexion 4*  4*    extension 4*  4*            ANKLE       dorsiflexion 5  5    plantarflexion 5  5    eversion       inversion       *pain      Positive L SLR  HIP IR of R  painful   HIP L IR painful and scour positive at ER     R lumbar soreness, no recreation of pain   TTP rene piriformis, glutes greater trochanters and SI joint         BALANCE TESTING  Romberg                           stable for 30 sec eyes open  Sharpened Romberg       stable for 30 sec eyes open  Single Leg Stance Right   lost balance after 20 secs eyes open no pain   Single Leg Stance Left      lost balance after 11 secs eyes open with pain in the upper glute    Therapy Education/Self Care [id number]   Education offered today NOC POC   Medbride Code    Ongoing HEP      Timed Minutes        Total Timed Treatment:     0   mins  Total Time of Visit:            40   mins    ASSESSMENT/PLAN     GOALS:  Goals                                                  Progress Note due by 11/21/24                                                              Recert due by 1/19   STG by: 4 weeks Comments Date Status   Improve rene thoracolumbar mobility       Improve muscle relaxation of posterior hip muscles       Improve  bilateral hip internal rotation to 35 deg or greater with no increase in pain               LTG by: 8 weeks       Reports no radicular symptoms in bilateral LE for a week.       Able to perform job duties without exacerbation of pain       SLS left leg x 30 secs without pain       Reports pain no greater than 4/10 when it does occur.       Improve MOLBPDQ to 10 or less IE:  24/40 (48% disability)     Independent with HEP for flexibility and core/hip stability.          Anticipated CPT codes: Therapeutic Exercise 94555, Manual Therapy 39663, Therapeutic Activity 77567, Neuromuscular ReEducation 64469, Self Care/Home Management 85353, Estim Attended 98400, and Estim Unattended [id number]      Assessment & Plan       Assessment  Impairments: abnormal or restricted ROM, impaired balance, impaired physical strength and lacks appropriate home exercise program   Functional limitations: lifting   Assessment details: Zena negrete  44 year old female who presents today with increased pain in her back and hips. She is limited at work d/t pain in her back with increased activities. Difficult to discern exact etiology as she had mixed positive/negative special testing mostly masked by pain. Her PMH of fibromyalgia may also play a role in her painful joints. Skilled PT will benefit this pt to reduce pain, improve muscular strength and endurance, and improve overall quality of life.   Prognosis: good    Plan  Therapy options: will be seen for skilled therapy services  Planned modality interventions: dry needling, high voltage pulsed current (pain management), low level laser therapy, TENS and traction  Planned therapy interventions: abdominal trunk stabilization, body mechanics training, functional ROM exercises, home exercise program, therapeutic activities, strengthening, spinal/joint mobilization, soft tissue mobilization and neuromuscular re-education  Frequency: 2x week  Duration in weeks: 8  Treatment plan discussed with: patient  Plan details: Perform manual and modalities as needed to address hip and back pain. Progress with strengthening and functional activities as appropriate.         SIGNATURE: Emily Dickerson PT DPT, [place] License #: [id number]  Electronically Signed on 10/23/2024      Initial Certification  Certification Period: 10/23/2024 through 1/20/2025  I certify that the therapy services are furnished while this patient is under my care.  The services outlined above are required by this patient, and will be reviewed every 90 days.     PHYSICIAN: DARIO Nichole MD (NPI: [id number])    Signature____________________________________________DATE: _________     Please sign and return via fax to [phone].   [email or web address]

## 2024-10-22 NOTE — PROGRESS NOTES
"Chief Complaint   Patient presents with    Altered Mental Status     \"Brain fog\" off and on but worse for about a week    Joint Pain     Worse than normal      History:      Zena Zheng is a 44 y.o. female who presents today for evaluation of the above problems.      HPI  History of Present Illness  The patient presents for evaluation of multiple medical concerns.    She mentions episodes of brain fog and joint pain, which were previously evaluated in the emergency room. A Holter monitor was recommended for further investigation.     She has been experiencing intermittent vision loss for several months. Despite being fitted with bifocals by her ophthalmologist, the issue persists. She has consulted her ophthalmologist twice regarding this matter. She describes episodes of shakiness, weakness, and fatigue, during which her vision becomes blurry. These episodes occur daily. She has attempted to correlate these symptoms with her blood sugar levels, which have been both below and above 100 during these episodes. She recalls three instances in her life where she felt extremely hot, lost vision, and became very tired. She reports no recent changes in medication or any current headaches. She had a two-day headache when she visited the emergency room.     She also reports that her heart rate increases when her shoulder hurts, leading her to suspect a possible heart attack.  He presented to the emergency department on 10/18/2024, she was having palpitations at that time.  A Holter monitor was ordered, she is scheduled to have this placed tomorrow.    She reports a sensation of brain fog that has been present for many months.  This does not correlate with the episodes described above.  She feels that she is in a constant fog and feels fatigued.  After further questioning she does report trying new medications that could be contributing to the brain fog.    She takes Zanaflex at night and drinks a lot of water in the " morning. Her diet mainly consists of fruits and vegetables as she is trying to lose weight.     She experiences pain in her hips, both shoulders, and knees. She injured her left shoulder over a year ago, which still causes her pain even when not in use. The pain in her hips worsened after a period of bedrest following surgery. She has been diagnosed with fibromyalgia, which has recently worsened. She is unsure if her current pain is related to her fibromyalgia. She has had flare-ups in the past, but none have lasted this long. Over-the-counter pain relievers like ibuprofen and Tylenol do not alleviate her pain. She has tried antidepressants in the past, but they caused her to panic. She is currently not taking any medication for her pain. She has been referred to physical therapy and has an appointment scheduled for November 2024. She occasionally does stretches at home.      ROS:  Review of Systems   Respiratory:  Negative for chest tightness and shortness of breath.    Cardiovascular:  Positive for palpitations. Negative for chest pain.   Musculoskeletal:  Positive for arthralgias.   Neurological:  Negative for dizziness, tremors and weakness.         Current Outpatient Medications:     acetaminophen (Tylenol) 325 MG tablet, Take 3 tablets by mouth Every 8 (Eight) Hours. Take every 8 hours for 3 days then take prn as needed., Disp: , Rfl:     Azelastine HCl 137 MCG/SPRAY solution, SPRAY 1 TO 2 SPRAYS INTO EACH NOSTRIL TWICE A DAY AS NEEDED, Disp: , Rfl:     fluticasone (FLONASE) 50 MCG/ACT nasal spray, 1 spray by Each Nare route Daily., Disp: 15.8 mL, Rfl: 11    gabapentin (NEURONTIN) 300 MG capsule, Take 1 capsule by mouth 2 (Two) Times a Day., Disp: 60 capsule, Rfl: 5    ibuprofen (Motrin IB) 200 MG tablet, Take 3 tablets by mouth Every 8 (Eight) Hours. Take every 8 hours for three days then take as needed., Disp: , Rfl:     lamoTRIgine (LaMICtal) 25 MG tablet, 2 TABLETS NIGHTLY, Disp: , Rfl:     loratadine  (Claritin) 10 MG tablet, Take 1 tablet by mouth Daily., Disp: , Rfl:     metoprolol succinate XL (TOPROL-XL) 25 MG 24 hr tablet, Take 1 tablet by mouth Daily., Disp: , Rfl:     metroNIDAZOLE (METROCREAM) 0.75 % cream, , Disp: , Rfl:     mometasone (ELOCON) 0.1 % solution, , Disp: , Rfl:     NON FORMULARY, Pt states that there are 2 more meds she takes name unknown for acne and rashs, Disp: , Rfl:     pimecrolimus (ELIDEL) 1 % cream, APPLY TO FULL FACE EVERY NIGHT, Disp: , Rfl:     tiZANidine (ZANAFLEX) 4 MG tablet, Take 1 tablet by mouth 2 (Two) Times a Day., Disp: , Rfl:     Lab Results   Component Value Date    GLUCOSE 104 (H) 10/18/2024    BUN 8 10/18/2024    CREATININE 0.74 10/18/2024     10/18/2024    K 4.1 10/18/2024     10/18/2024    CALCIUM 9.4 10/18/2024    PROTEINTOT 6.9 10/18/2024    ALBUMIN 4.2 10/18/2024    ALT 12 10/18/2024    AST 15 10/18/2024    ALKPHOS 61 10/18/2024    BILITOT 0.2 10/18/2024    GLOB 2.7 10/18/2024    AGRATIO 1.6 10/18/2024    BCR 10.8 10/18/2024    ANIONGAP 11.0 10/18/2024    EGFR 101.8 10/18/2024       WBC   Date Value Ref Range Status   10/18/2024 9.13 3.40 - 10.80 10*3/mm3 Final   02/21/2023 10.4 4.8 - 10.8 K/uL Final     RBC   Date Value Ref Range Status   10/18/2024 4.40 3.77 - 5.28 10*6/mm3 Final   02/21/2023 4.47 4.20 - 5.40 M/uL Final     Hemoglobin   Date Value Ref Range Status   10/18/2024 13.1 12.0 - 15.9 g/dL Final   02/21/2023 13.5 12.0 - 16.0 g/dL Final     Hematocrit   Date Value Ref Range Status   10/18/2024 39.4 34.0 - 46.6 % Final   02/21/2023 41.9 37.0 - 47.0 % Final     MCV   Date Value Ref Range Status   10/18/2024 89.5 79.0 - 97.0 fL Final   02/21/2023 93.7 81.0 - 99.0 fL Final     MCH   Date Value Ref Range Status   10/18/2024 29.8 26.6 - 33.0 pg Final   02/21/2023 30.2 27.0 - 31.0 pg Final     MCHC   Date Value Ref Range Status   10/18/2024 33.2 31.5 - 35.7 g/dL Final   02/21/2023 32.2 (L) 33.0 - 37.0 g/dL Final     RDW   Date Value Ref Range  Status   10/18/2024 13.0 12.3 - 15.4 % Final   02/21/2023 13.2 11.5 - 14.5 % Final     RDW-SD   Date Value Ref Range Status   10/18/2024 42.8 37.0 - 54.0 fl Final     MPV   Date Value Ref Range Status   10/18/2024 10.4 6.0 - 12.0 fL Final   02/21/2023 10.4 9.4 - 12.3 fL Final     Platelets   Date Value Ref Range Status   10/18/2024 361 140 - 450 10*3/mm3 Final   02/21/2023 331 130 - 400 K/uL Final     Neutrophil Rel %   Date Value Ref Range Status   02/21/2023 67.7 (H) 50.0 - 65.0 % Final     Neutrophil %   Date Value Ref Range Status   10/18/2024 63.2 42.7 - 76.0 % Final     Lymphocyte Rel %   Date Value Ref Range Status   02/21/2023 26.2 20.0 - 40.0 % Final     Lymphocyte %   Date Value Ref Range Status   10/18/2024 27.9 19.6 - 45.3 % Final     Monocyte Rel %   Date Value Ref Range Status   02/21/2023 5.4 0.0 - 10.0 % Final     Monocyte %   Date Value Ref Range Status   10/18/2024 6.9 5.0 - 12.0 % Final     Eosinophil Rel %   Date Value Ref Range Status   02/21/2023 0.1 0.0 - 5.0 % Final     Eosinophil %   Date Value Ref Range Status   10/18/2024 1.0 0.3 - 6.2 % Final     Basophil Rel %   Date Value Ref Range Status   02/21/2023 0.4 0.0 - 1.0 % Final     Basophil %   Date Value Ref Range Status   10/18/2024 0.8 0.0 - 1.5 % Final     Immature Grans %   Date Value Ref Range Status   10/18/2024 0.2 0.0 - 0.5 % Final     Neutrophils Absolute   Date Value Ref Range Status   02/21/2023 7.0 1.5 - 7.5 K/uL Final     Neutrophils, Absolute   Date Value Ref Range Status   10/18/2024 5.77 1.70 - 7.00 10*3/mm3 Final     Lymphocytes Absolute   Date Value Ref Range Status   02/21/2023 2.7 1.1 - 4.5 K/uL Final     Lymphocytes, Absolute   Date Value Ref Range Status   10/18/2024 2.55 0.70 - 3.10 10*3/mm3 Final     Monocytes Absolute   Date Value Ref Range Status   02/21/2023 0.60 0.00 - 0.90 K/uL Final     Monocytes, Absolute   Date Value Ref Range Status   10/18/2024 0.63 0.10 - 0.90 10*3/mm3 Final     Eosinophils Absolute   Date  "Value Ref Range Status   02/21/2023 0.00 0.00 - 0.60 K/uL Final     Eosinophils, Absolute   Date Value Ref Range Status   10/18/2024 0.09 0.00 - 0.40 10*3/mm3 Final     Basophils Absolute   Date Value Ref Range Status   02/21/2023 0.00 0.00 - 0.20 K/uL Final     Basophils, Absolute   Date Value Ref Range Status   10/18/2024 0.07 0.00 - 0.20 10*3/mm3 Final     Immature Grans, Absolute   Date Value Ref Range Status   10/18/2024 0.02 0.00 - 0.05 10*3/mm3 Final   02/21/2023 0.0 K/uL Final     nRBC   Date Value Ref Range Status   10/18/2024 0.0 0.0 - 0.2 /100 WBC Final         OBJECTIVE:  Visit Vitals  /78 (BP Location: Right arm, Patient Position: Sitting, Cuff Size: Adult)   Pulse 90   Ht 167.6 cm (66\")   Wt 84.8 kg (187 lb)   LMP 10/10/2024 (Approximate)   SpO2 99%   BMI 30.18 kg/m²      Physical Exam  Constitutional:       Appearance: Normal appearance.   HENT:      Head: Normocephalic.   Cardiovascular:      Rate and Rhythm: Normal rate and regular rhythm.      Pulses: Normal pulses.      Heart sounds: Normal heart sounds.   Pulmonary:      Effort: Pulmonary effort is normal.      Breath sounds: Normal breath sounds.   Musculoskeletal:         General: Normal range of motion.   Skin:     General: Skin is warm and dry.   Neurological:      Mental Status: She is alert and oriented to person, place, and time.      Cranial Nerves: Cranial nerves 2-12 are intact. No cranial nerve deficit or facial asymmetry.      Motor: No weakness.   Psychiatric:         Mood and Affect: Mood normal.         Behavior: Behavior normal.         Thought Content: Thought content normal.         Judgment: Judgment normal.       Results  Laboratory Studies  Free T4 was a little bit low. Metabolic panel is fine. D-dimer was a little high. Last A1c was good. Vitamin B12 and vitamin D levels were checked and were normal.    Imaging  Chest x-ray was normal. X-ray of the low back showed mild scoliosis and mild to moderate degenerative disc " disease.    Assessment/Plan    Diagnoses and all orders for this visit:    1. Visual disturbance (Primary)  -     CT Head Without Contrast; Future    2. Palpitations    3. Brain fog    4. Multiple joint pain      Assessment & Plan  1.  Visual disturbance.  Her chest x-ray and thyroid function tests were normal, with a slightly low free T4 level. She is not anemic, and her metabolic panel is normal. Her last A1c was within normal range. Her vitamin B12 and D levels were also normal. A CT scan of the head will be ordered to investigate the cause of her visual disturbances. She is advised to monitor her blood sugar levels and dietary intake, ensuring a balanced diet. She should continue her D3 and multivitamin supplements, but discontinue all other supplements for a few weeks to assess their impact on her symptoms.  I have instructed her to seek emergency medical care if any persistent symptoms occur.    2. Fibromyalgia.  Her previous x-rays revealed mild scoliosis and mild to moderate degenerative disc disease in her lower back, which could be causing referred pain to her hips. She is advised to continue with physical therapy and perform stretches at home. If there is no improvement, further interventions will be considered.    3. Brain fog.  Her medication regimen, including Lamictal and gabapentin, could be contributing to her brain fog. I will consult with her psychiatrist regarding potential medication adjustments to alleviate her brain fog. Additional labs will also be ordered today for further evaluation.    4.  Palpitations.  She does not report having sensation of palpitations regularly although they were present at her recent visit to the emergency department.  She does have a Holter monitor scheduled to be placed tomorrow.  Recommend keeping this appointment.  Thyroid level was within normal limits during her visit to the emergency department.    Follow-up  She will follow up in 12/2024.      Return in about  2 months (around 12/22/2024) for Next scheduled follow up.      AARON Hummel  15:18 CDT  10/23/2024   Electronically signed      Patient or patient representative verbalized consent for the use of Ambient Listening during the visit with  AARON Hummel for chart documentation. 10/23/2024  09:56 CDT

## 2024-10-23 ENCOUNTER — HOSPITAL ENCOUNTER (OUTPATIENT)
Dept: CARDIOLOGY | Facility: HOSPITAL | Age: 44
Discharge: HOME | End: 2024-10-23
Admitting: INTERNAL MEDICINE
Payer: COMMERCIAL

## 2024-10-23 DIAGNOSIS — R55 VASOVAGAL SYNCOPE: ICD-10-CM

## 2024-10-23 PROCEDURE — 93246 EXT ECG>7D<15D RECORDING: CPT

## 2024-11-05 ENCOUNTER — HOSPITAL ENCOUNTER (OUTPATIENT)
Dept: PHYSICAL THERAPY | Facility: HOSPITAL | Age: 44
Setting detail: THERAPIES SERIES
Discharge: HOME | End: 2024-11-05
Payer: COMMERCIAL

## 2024-11-05 DIAGNOSIS — M54.17 RADICULOPATHY OF LUMBOSACRAL REGION: ICD-10-CM

## 2024-11-05 DIAGNOSIS — M54.50 LUMBAR BACK PAIN: Primary | ICD-10-CM

## 2024-11-05 PROCEDURE — 97140 MANUAL THERAPY 1/> REGIONS: CPT

## 2024-11-05 PROCEDURE — 97110 THERAPEUTIC EXERCISES: CPT

## 2024-11-05 NOTE — THERAPY TREATMENT NOTE
Physical Therapy Treatment Note  [facility]  [address], [place] [address]    Patient: Zena Zheng                                                 Visit Date: 2024  :     1980    Referring practitioner:    DARIO Nichole MD  Date of Initial Visit:          Type: THERAPY  Episode: LBP with radicular symptoms  Number of visits this episode: 2    Visit Diagnoses:    ICD-10-CM ICD-9-CM   1. Lumbar back pain  M54.50 724.2   2. Radiculopathy of lumbosacral region  M54.17 724.4     SUBJECTIVE     Subjective: states she is having some burning in her back today, noting it was worse earlier this morning. Notes she gets pain down her legs, and when she uses some air compression sleeves it seems to help.      PAIN: 10 R hip/Lx; 2-4/10 R hip/Lx following treatment     OBJECTIVE     Objective     Therapeutic Exercises    21429 Units Comments   LTR w/ SB 2 min    DKTC w/ SB 2 min    HL hip ADD w/ blue ball 15x3 sec    BKFO w/ RTB X15 ea    Seated B piriformis release w/ white ball     Timed Minutes 19     Manual Therapy     38880  Comments   BLE LAD    B hip IR/ER    B HS/piriformis stretches    STM/MFR to B Lx nakita    B piriformis release prone   Timed Minutes 24     Therapy Education/Self Care 52251   Education offered today NOC POC   Medbride Code     Ongoing HEP       Timed Minutes       Total Timed Treatment:     43   mins  Total Time of Visit:             43   mins         ASSESSMENT/PLAN     GOALS  Goals                                                  Progress Note due by 24                                                              Recert due by 25   STG by: 4 weeks Comments Date Status   Improve rene thoracolumbar mobility         Improve muscle relaxation of posterior hip muscles         Improve  bilateral hip internal rotation to 35 deg or greater with no increase in pain                   LTG by: 8 weeks          Reports no radicular symptoms in bilateral LE for a week.         Able to perform job duties without exacerbation of pain         SLS left leg x 30 secs without pain         Reports pain no greater than 4/10 when it does occur.  6/10 today 11/5 ongoing   Improve MOLBPDQ to 10 or less IE:  24/40 (48% disability)       Independent with HEP for flexibility and core/hip stability.           Anticipated CPT codes: Therapeutic Exercise 09432, Manual Therapy 37049, Therapeutic Activity 08333, Neuromuscular ReEducation 86664, Self Care/Home Management 72694, Estim Attended [id number], and Estim Unattended [id number]      Assessment/Plan     ASSESSMENT:   Pt reported cont Lx pain, noting she feels some burning up into her R Tx region as well as discomfort into her B buttocks/hips. She noted no increased discomfort during exercises. She presented with increased BLE tightness R>L, with TP activity in her B piriformis. Following treatment she reported decreased Lx pain and tightness.     PLAN:   Perform manual and modalities as needed to address hip and back pain. Progress with strengthening and functional activities as appropriate.     SIGNATURE: Kenny Zheng PTA, [place] License #: [id number]  Electronically Signed on 11/5/2024        [address] [place] [address]. [address]  [phone]

## 2024-11-06 PROCEDURE — 87661 TRICHOMONAS VAGINALIS AMPLIF: CPT

## 2024-11-06 PROCEDURE — 87798 DETECT AGENT NOS DNA AMP: CPT

## 2024-11-06 PROCEDURE — 87491 CHLMYD TRACH DNA AMP PROBE: CPT

## 2024-11-06 PROCEDURE — 87591 N.GONORRHOEAE DNA AMP PROB: CPT

## 2024-11-06 PROCEDURE — 87801 DETECT AGNT MULT DNA AMPLI: CPT

## 2024-11-07 LAB
CV ZIO BASELINE AVG BPM: 85 BPM
CV ZIO BASELINE BPM HIGH: 159 BPM
CV ZIO BASELINE BPM LOW: 48 BPM
CV ZIO DEVICE ANALYSIS TIME: NORMAL
CV ZIO ECT SVE COUNT: 29 EPISODES
CV ZIO ECT SVE CPLT COUNT: 4 EPISODES
CV ZIO ECT SVE CPLT FREQ: NORMAL
CV ZIO ECT SVE FREQ: NORMAL
CV ZIO ECT SVE TPLT COUNT: 0 EPISODES
CV ZIO ECT SVE TPLT FREQ: 0
CV ZIO ECT VE COUNT: 28 EPISODES
CV ZIO ECT VE CPLT COUNT: 0 EPISODES
CV ZIO ECT VE CPLT FREQ: 0
CV ZIO ECT VE FREQ: NORMAL
CV ZIO ECT VE TPLT COUNT: 0 EPISODES
CV ZIO ECT VE TPLT FREQ: 0
CV ZIO ECTOPIC SVE COUPLET RAW PERCENT: 0 %
CV ZIO ECTOPIC SVE ISOLATED PERCENT: 0 %
CV ZIO ECTOPIC SVE TRIPLET RAW PERCENT: 0 %
CV ZIO ECTOPIC VE COUPLET RAW PERCENT: 0 %
CV ZIO ECTOPIC VE ISOLATED PERCENT: 0 %
CV ZIO ECTOPIC VE TRIPLET RAW PERCENT: 0 %
CV ZIO ENROLLMENT END: NORMAL
CV ZIO ENROLLMENT START: NORMAL
CV ZIO PATIENT EVENTS DIARIES: 0
CV ZIO PATIENT EVENTS TRIGGERS: 0
CV ZIO PAUSE COUNT: 0
CV ZIO PRESCRIPTION STATUS: NORMAL
CV ZIO SVT COUNT: 0
CV ZIO TOTAL  ENROLLMENT PERIOD: NORMAL
CV ZIO VT COUNT: 0

## 2024-11-08 ENCOUNTER — HOSPITAL ENCOUNTER (OUTPATIENT)
Dept: PHYSICAL THERAPY | Facility: HOSPITAL | Age: 44
Setting detail: THERAPIES SERIES
Discharge: HOME | End: 2024-11-08
Payer: COMMERCIAL

## 2024-11-08 DIAGNOSIS — M54.50 LUMBAR BACK PAIN: Primary | ICD-10-CM

## 2024-11-08 DIAGNOSIS — M54.17 RADICULOPATHY OF LUMBOSACRAL REGION: ICD-10-CM

## 2024-11-08 PROCEDURE — 97110 THERAPEUTIC EXERCISES: CPT

## 2024-11-08 PROCEDURE — 97140 MANUAL THERAPY 1/> REGIONS: CPT

## 2024-11-08 NOTE — THERAPY TREATMENT NOTE
Physical Therapy Treatment Note  [facility]  [address], [place] [address]    Patient: Zena Zheng                                                 Visit Date: 2024  :     1980    Referring practitioner:    DARIO Nichole MD  Date of Initial Visit:          Type: THERAPY  Episode: LBP with radicular symptoms  Number of visits this episode: 3    Visit Diagnoses:    ICD-10-CM ICD-9-CM   1. Lumbar back pain  M54.50 724.2   2. Radiculopathy of lumbosacral region  M54.17 724.4     SUBJECTIVE     Subjective: States she still has some soreness and tightness in her back, noting if she sits certain ways she will have the burning. .      PAIN: 5/10 R hip/Lx     OBJECTIVE     Objective     Therapeutic Exercises    03262 Units Comments   Pallof press @ cybex L2 X15 ea    Pallof press walkouts @ cybex L2 X10 ea    Bridges w/ SB 2x10         Timed Minutes 18     Manual Therapy     12515  Comments   BLE LAD    B hip IR/ER    B HS/piriformis stretches    SI check Rotation present, corrected       Timed Minutes 18     Therapy Education/Self Care 66626   Education offered today NOC POC   Medbride Code     Ongoing HEP       Timed Minutes       Total Timed Treatment:     36   mins  Total Time of Visit:             36   mins         ASSESSMENT/PLAN     GOALS  Goals                                                  Progress Note due by 24                                                              Recert due by 25   STG by: 4 weeks Comments Date Status   Improve rene thoracolumbar mobility         Improve muscle relaxation of posterior hip muscles         Improve  bilateral hip internal rotation to 35 deg or greater with no increase in pain                   LTG by: 8 weeks         Reports no radicular symptoms in bilateral LE for a week.         Able to perform job duties without exacerbation of pain         SLS left leg x 30 secs  without pain         Reports pain no greater than 4/10 when it does occur.  6/10 today 11/5 ongoing   Improve MOLBPDQ to 10 or less IE:  24/40 (48% disability)       Independent with HEP for flexibility and core/hip stability.           Anticipated CPT codes: Therapeutic Exercise 67131, Manual Therapy 94100, Therapeutic Activity 20252, Neuromuscular ReEducation 85282, Self Care/Home Management 53143, Estim Attended 75154, and Estim Unattended 09115      Assessment/Plan     ASSESSMENT: Pt was late to appointment so we were unable to get full treatment time.   Pt reported some cont Lx pain R>L, noting she only has the burning sensation if she sits too long. She noted no increased discomfort during exercises. She presented with cont BLE tightness R>L, with R SIJ rotation. Following treatment she reported decreased Lx pain and tightness, with improved bending ability.     PLAN:   Perform manual and modalities as needed to address hip and back pain. Progress with strengthening and functional activities as appropriate.     SIGNATURE: Kenny Zheng PTA, [place] License #: [id number]  Electronically Signed on 11/8/2024        [address] [place] [address] [place]. [address]  [phone]

## 2024-11-11 ENCOUNTER — HOSPITAL ENCOUNTER (OUTPATIENT)
Dept: PHYSICAL THERAPY | Facility: HOSPITAL | Age: 44
Setting detail: THERAPIES SERIES
Discharge: HOME | End: 2024-11-11
Payer: COMMERCIAL

## 2024-11-11 DIAGNOSIS — M54.17 RADICULOPATHY OF LUMBOSACRAL REGION: ICD-10-CM

## 2024-11-11 DIAGNOSIS — M54.50 LUMBAR BACK PAIN: Primary | ICD-10-CM

## 2024-11-11 PROCEDURE — 97140 MANUAL THERAPY 1/> REGIONS: CPT

## 2024-11-11 PROCEDURE — 97110 THERAPEUTIC EXERCISES: CPT

## 2024-11-11 NOTE — THERAPY TREATMENT NOTE
Physical Therapy Treatment Note  [facility]  [address], [place] [address]    Patient: Zena Zheng                                                 Visit Date: 2024  :     1980    Referring practitioner:    DARIO Nichole MD  Date of Initial Visit:          Type: THERAPY  Episode: LBP with radicular symptoms  Number of visits this episode: 4    Visit Diagnoses:    ICD-10-CM ICD-9-CM   1. Lumbar back pain  M54.50 724.2   2. Radiculopathy of lumbosacral region  M54.17 724.4     SUBJECTIVE     Subjective: States she still has some soreness and tightness in her R hip, noting she has had a little numbness in her feet today.       PAIN: 10 R hip     OBJECTIVE     Objective     Therapeutic Exercises    14942 Units Comments   Pallof press twists @ cybex L2 X15 ea    Resisted side stepping  50ft x1 Blue TB   Deadlift @ cybex L3 2x10    Clamshells w/ blue TB 2x10 ea    Reverse clamshells w/ blue ball 2x10 ea    Donkey kicks X15 ea         Timed Minutes 23     Manual Therapy     96999  Comments   BLE LAD    B hip IR/ER    B HS/piriformis stretches    IASTM w/ Powerboost L1 flat to R piriformis/glute med/TFL    Timed Minutes 18     Therapy Education/Self Care 93436   Education offered today NOC POC   Medbride Code     Ongoing HEP       Timed Minutes       Total Timed Treatment:     41   mins  Total Time of Visit:             41   mins         ASSESSMENT/PLAN     GOALS  Goals                                                  Progress Note due by 24                                                              Recert due by 25   STG by: 4 weeks Comments Date Status   Improve rene thoracolumbar mobility         Improve muscle relaxation of posterior hip muscles         Improve  bilateral hip internal rotation to 35 deg or greater with no increase in pain                   LTG by: 8 weeks         Reports no radicular symptoms  in bilateral LE for a week.         Able to perform job duties without exacerbation of pain         SLS left leg x 30 secs without pain         Reports pain no greater than 4/10 when it does occur.  6/10 today 11/5 ongoing   Improve MOLBPDQ to 10 or less IE:  24/40 (48% disability)       Independent with HEP for flexibility and core/hip stability.           Anticipated CPT codes: Therapeutic Exercise 38457, Manual Therapy 57976, Therapeutic Activity 06136, Neuromuscular ReEducation 57462, Self Care/Home Management 47148, Estim Attended 49942, and Estim Unattended [id number]      Assessment/Plan     ASSESSMENT:   Pt reported some cont R hip/buttock soreness and tightness, noting she did have slight rad s/s in her feet for a few minutes today. She noted no increased discomfort during exercises. She presented with cont BLE tightness R>L, with TP activity in her R glute med/piriformis regions. Following treatment she reported decreased pain and tightness.     PLAN:   Perform manual and modalities as needed to address hip and back pain. Progress with strengthening and functional activities as appropriate.     SIGNATURE: Kenny Zheng PTA, [place] License #: [id number]  Electronically Signed on 11/11/2024        [address] [place] [address] [place]. [address]  [phone]

## 2024-11-13 ENCOUNTER — HOSPITAL ENCOUNTER (OUTPATIENT)
Dept: PHYSICAL THERAPY | Facility: HOSPITAL | Age: 44
Setting detail: THERAPIES SERIES
Discharge: HOME | End: 2024-11-13
Payer: COMMERCIAL

## 2024-11-13 DIAGNOSIS — M54.50 LUMBAR BACK PAIN: Primary | ICD-10-CM

## 2024-11-13 DIAGNOSIS — M54.17 RADICULOPATHY OF LUMBOSACRAL REGION: ICD-10-CM

## 2024-11-13 PROCEDURE — 97140 MANUAL THERAPY 1/> REGIONS: CPT

## 2024-11-13 PROCEDURE — G0283 ELEC STIM OTHER THAN WOUND: HCPCS

## 2024-11-13 PROCEDURE — 97110 THERAPEUTIC EXERCISES: CPT

## 2024-11-13 NOTE — THERAPY TREATMENT NOTE
Physical Therapy Treatment Note  [facility]  A department [facility] [address], [place] [address]    Patient: Zena Zheng                                                 Visit Date: 2024  :     1980    Referring practitioner:    DARIO Nichole MD  Date of Initial Visit:          Type: THERAPY  Episode: LBP with radicular symptoms  Number of visits this episode: 5    Visit Diagnoses:    ICD-10-CM ICD-9-CM   1. Lumbar back pain  M54.50 724.2   2. Radiculopathy of lumbosacral region  M54.17 724.4     SUBJECTIVE     Subjective: States she still has some soreness and tightness in her R hip, noting she was up and down stairs all day.       PAIN: 6-7/10 R hip     OBJECTIVE     Objective     Therapeutic Exercises    51287 Units Comments   BLE shuttle press 7 bands 5 min    Bridges on SB 10x5 sec    Hip rainbows w/ 2# X10 ea         Timed Minutes 12     Manual Therapy     20709  Comments   BLE LAD    SI check Rotation present, corrected   STM to R piriformis/TFL/glute med        Timed Minutes 18     Modalities Comments   IFC estim R hip    28 mA   Minutes 10       Therapy Education/Self Care 40702   Education offered today NOC POC   Medbride Code     Ongoing HEP       Timed Minutes       Total Timed Treatment:     40   mins  Total Time of Visit:             40   mins         ASSESSMENT/PLAN     GOALS  Goals                                                  Progress Note due by 24                                                              Recert due by 25   STG by: 4 weeks Comments Date Status   Improve rene thoracolumbar mobility         Improve muscle relaxation of posterior hip muscles         Improve  bilateral hip internal rotation to 35 deg or greater with no increase in pain                   LTG by: 8 weeks         Reports no radicular symptoms in bilateral LE for a week.         Able to perform job duties without exacerbation of  pain         SLS left leg x 30 secs without pain         Reports pain no greater than 4/10 when it does occur.  6/10 today 11/5 ongoing   Improve MOLBPDQ to 10 or less IE:  24/40 (48% disability)       Independent with HEP for flexibility and core/hip stability.           Anticipated CPT codes: Therapeutic Exercise 78350, Manual Therapy 26618, Therapeutic Activity 77931, Neuromuscular ReEducation 68735, Self Care/Home Management 23965, Estim Attended 16077, and Estim Unattended 11434      Assessment/Plan     ASSESSMENT:   Pt reported some increased R hip/buttock soreness and tightness, noting she was up and down stairs all day at work. She noted no increased discomfort during exercises. She presented with cont BLE tightness R>L, with TP activity and tenderness in her R TFL and trochanteric bursa regions. Following treatment she reported decreased pain and tightness.     PLAN:   Perform manual and modalities as needed to address hip and back pain. Progress with strengthening and functional activities as appropriate.     SIGNATURE: Kenny Zheng PTA, [place] License #: [id number]  Electronically Signed on 11/13/2024        [address] [place] [address] [place] [address]  [phone]

## 2024-11-18 ENCOUNTER — TELEMEDICINE (OUTPATIENT)
Dept: FAMILY MEDICINE CLINIC | Facility: TELEHEALTH | Age: 44
End: 2024-11-18
Payer: COMMERCIAL

## 2024-11-18 ENCOUNTER — APPOINTMENT (OUTPATIENT)
Dept: PHYSICAL THERAPY | Facility: HOSPITAL | Age: 44
End: 2024-11-18
Payer: COMMERCIAL

## 2024-11-18 DIAGNOSIS — J01.00 ACUTE MAXILLARY SINUSITIS, RECURRENCE NOT SPECIFIED: Primary | ICD-10-CM

## 2024-11-18 PROCEDURE — 99213 OFFICE O/P EST LOW 20 MIN: CPT | Performed by: NURSE PRACTITIONER

## 2024-11-18 RX ORDER — AZITHROMYCIN 250 MG/1
TABLET, FILM COATED ORAL
Qty: 6 TABLET | Refills: 0 | Status: SHIPPED | OUTPATIENT
Start: 2024-11-18

## 2024-11-18 NOTE — PROGRESS NOTES
Subjective   Zena Zheng is a 44 y.o. female.     History of Present Illness  The patient presents for evaluation of a sinus infection.    She has been experiencing symptoms of a sinus infection for the past week, which have not improved. This morning, she woke up with a burning sensation in her throat and nasal passages. Her symptoms have worsened, and she feels extremely fatigued. She reports pain and tenderness in her jaw, with the left side being more tense than the right. She also experiences pain in her left ear and a general feeling of coldness.     She has been coughing up mucus but is not experiencing any shortness of breath. Additionally, she has been experiencing nausea and diarrhea, along with joint pain. She is not aware of any recent exposure to sick individuals.    The following portions of the patient's history were reviewed and updated as appropriate: allergies, current medications, past family history, past medical history, past social history, past surgical history, and problem list.    Review of Systems   Constitutional:  Positive for chills. Negative for fever.   HENT:  Positive for congestion, ear pain, rhinorrhea and sinus pressure.    Respiratory:  Positive for cough. Negative for shortness of breath.    Gastrointestinal:  Positive for diarrhea and nausea. Negative for vomiting.   Musculoskeletal:  Positive for arthralgias.   Allergic/Immunologic: Positive for environmental allergies.   Neurological:  Positive for headache.       Objective   Physical Exam  Constitutional:       General: She is not in acute distress.     Appearance: She is well-developed. She is not diaphoretic.   HENT:      Nose:      Right Sinus: Maxillary sinus tenderness present.      Left Sinus: Maxillary sinus tenderness present.   Pulmonary:      Effort: Pulmonary effort is normal.   Neurological:      Mental Status: She is alert and oriented to person, place, and time.   Psychiatric:         Behavior: Behavior  normal.       Assessment & Plan   Diagnoses and all orders for this visit:    1. Acute maxillary sinusitis, recurrence not specified (Primary)  -     ANAM FLU + SARS PCR; Future  -     azithromycin (Zithromax Z-Arnold) 250 MG tablet; Take 2 tablets by mouth on day 1, then 1 tablet daily on days 2-5  Dispense: 6 tablet; Refill: 0      Covid/Flu negative today  Patient states she has tolerated Zithromax in the past      Patient or patient representative verbalized consent for the use of Ambient Listening during the visit with  AARON Elizabeth for chart documentation. 11/18/2024  15:35 EST    The use of a video visit has been reviewed with the patient and verbal informed consent has been obtained. Myself and Zena Zheng participated in this visit. The patient is located in  [place], [place] . I am located in [place], [place]. [employer] and [employer] were utilized. I spent 22 minutes in the patient's chart for this visit.

## 2024-11-18 NOTE — LETTER
November 18, 2024     Patient: Zena Zheng   YOB: 1980   Date of Visit: 11/18/2024       To Whom It May Concern:    Please excuse  Zena Zheng from work today.            Sincerely,        AARON Elizabeth      CC: No Recipients

## 2024-11-19 ENCOUNTER — TELEPHONE (OUTPATIENT)
Dept: INTERNAL MEDICINE | Facility: CLINIC | Age: 44
End: 2024-11-19

## 2024-11-19 DIAGNOSIS — R00.0 TACHYCARDIA: Primary | ICD-10-CM

## 2024-11-19 NOTE — TELEPHONE ENCOUNTER
This writer spoke to patient regarding appointment that was scheduled today at 4 pm. Patient states she wanted to be tested for POTS and was advised that this is something that is not done in the office here but by Cardiology. She was advised that a referral can be placed and appointment for today was not needed.     Patient reports having symptoms of elevated heart rate with standing and walking. She is allergic to the cold and unable to control body temp. There was a referral placed for holter monitor but per patient this was worn for 2 days. States she is allergic to adhesive and had blisters.

## 2024-11-19 NOTE — TELEPHONE ENCOUNTER
Attempted to call patients' parent to confirm tomorrow's appt with peds endo; to no avail.  Left voice message to return call to confirm.   Referral has been placed.  Thanks

## 2024-11-20 ENCOUNTER — PROCEDURE VISIT (OUTPATIENT)
Dept: OBGYN CLINIC | Age: 44
End: 2024-11-20
Payer: COMMERCIAL

## 2024-11-20 ENCOUNTER — APPOINTMENT (OUTPATIENT)
Dept: PHYSICAL THERAPY | Facility: HOSPITAL | Age: 44
End: 2024-11-20
Payer: COMMERCIAL

## 2024-11-20 VITALS
BODY MASS INDEX: 29.21 KG/M2 | WEIGHT: 181 LBS | HEART RATE: 84 BPM | DIASTOLIC BLOOD PRESSURE: 83 MMHG | SYSTOLIC BLOOD PRESSURE: 129 MMHG

## 2024-11-20 DIAGNOSIS — R87.610 ASCUS WITH POSITIVE HIGH RISK HPV CERVICAL: Primary | ICD-10-CM

## 2024-11-20 DIAGNOSIS — R87.810 ASCUS WITH POSITIVE HIGH RISK HPV CERVICAL: Primary | ICD-10-CM

## 2024-11-20 LAB
CONTROL: NORMAL
PREGNANCY TEST URINE, POC: NORMAL

## 2024-11-20 PROCEDURE — 81025 URINE PREGNANCY TEST: CPT | Performed by: NURSE PRACTITIONER

## 2024-11-20 PROCEDURE — 57454 BX/CURETT OF CERVIX W/SCOPE: CPT | Performed by: NURSE PRACTITIONER

## 2024-11-20 RX ORDER — DULOXETIN HYDROCHLORIDE 30 MG/1
30 CAPSULE, DELAYED RELEASE ORAL EVERY MORNING
COMMUNITY
Start: 2024-11-12

## 2024-11-20 RX ORDER — FLUTICASONE PROPIONATE 50 MCG
SPRAY, SUSPENSION (ML) NASAL
COMMUNITY
Start: 2024-11-17

## 2024-11-20 NOTE — PROGRESS NOTES
[facility]  CNM Office Note    Roxann Colindres is a 44 y.o. female who presents today for her medical conditions/ complaints as noted below.  Chief Complaint   Patient presents with    Procedure     Pt is here for colpo due to pap results ASCUS +HPV 16. Consent signed and urine preg test is neg in office.          Roxann presents to the office for a colposcopy after having an abnormal pap in September.       Patient Active Problem List   Diagnosis    CHANTALE III (cervical intraepithelial neoplasia grade III) with severe dysplasia       Patient's last menstrual period was 2024.      Past Medical History:   Diagnosis Date    Abnormal Pap smear of cervix     Allergic     Anemia     with pregnancy    ASCUS favor benign 10/2015    Depression     Fibromyalgia     Recurrent miscarriages      Past Surgical History:   Procedure Laterality Date    ANKLE SURGERY Left     aprox in     DENTAL SURGERY      Tooth Implant    DILATION AND CURETTAGE OF UTERUS      LEEP N/A 2020    LEEP performed by Chica Copeland MD at [facility] OR    SALPINGECTOMY Bilateral     Dr. Burrell     Family History   Problem Relation Age of Onset    Diabetes Paternal Grandmother     Stroke Father      Social History     Tobacco Use    Smoking status: Never    Smokeless tobacco: Never   Substance Use Topics    Alcohol use: Yes     Comment: socially       Current Outpatient Medications   Medication Sig Dispense Refill    DULoxetine (CYMBALTA) 30 MG extended release capsule Take 1 capsule by mouth every morning      fluticasone (FLONASE) 50 MCG/ACT nasal spray       Dextromethorphan-buPROPion ER (AUVELITY)  MG TBCR Take by mouth      NONFORMULARY Pt states that there are 2 more meds she takes name unknown for acne and rashs      tiZANidine (ZANAFLEX) 4 MG tablet Take 1 tablet by mouth 2 times daily      ondansetron (ZOFRAN) 4 MG tablet Every 8 (Eight) Hours.      lamoTRIgine (LAMICTAL) 25 MG tablet Take 1 tablet

## 2024-11-20 NOTE — PATIENT INSTRUCTIONS
Patient Education        Colposcopy: What to Expect at Home  Your Recovery  You may feel some soreness in your vagina for a day or two if you had a biopsy. Some vaginal bleeding or discharge for about a week after a biopsy is normal. The discharge may be dark-colored. You may also have some spotting for about 3 weeks. You can use a sanitary pad for the bleeding.  It may take a week or two for you to get the test results.  This care sheet gives you a general idea about how long it will take for you to recover. But each person recovers at a different pace. Follow the steps below to feel better as quickly as possible.  How can you care for yourself at home?  Activity    You can return to work and most daily activities right after the test.   Exercise    Do not exercise for 1 day after the test.   Medicines    Your doctor will tell you if and when you can restart your medicines. You will also get instructions about taking any new medicines.     If you stopped taking aspirin or some other blood thinner, your doctor will tell you when to start taking it again.     Take an over-the-counter pain medicine, such as acetaminophen (Tylenol), ibuprofen (Advil, Motrin), or naproxen (Aleve). Be safe with medicines. Read and follow all instructions on the label. Do not take two or more pain medicines at the same time unless the doctor told you to. Many pain medicines have acetaminophen, which is Tylenol. Too much acetaminophen (Tylenol) can be harmful.   Other instructions    Some vaginal bleeding or discharge after a biopsy is normal. The discharge may be dark-colored. Use a pad if you have some bleeding.     If you had a biopsy, do not have vaginal sex or place anything in your vagina for 1 week or until your doctor tells you it is okay. Do not douche.     You can take a bath or shower anytime after the test.   Follow-up care is a key part of your treatment and safety. Be sure to make and go to all appointments, and call your

## 2024-11-25 ENCOUNTER — HOSPITAL ENCOUNTER (OUTPATIENT)
Dept: PHYSICAL THERAPY | Facility: HOSPITAL | Age: 44
Setting detail: THERAPIES SERIES
Discharge: HOME | End: 2024-11-25
Payer: COMMERCIAL

## 2024-11-25 DIAGNOSIS — M54.17 RADICULOPATHY OF LUMBOSACRAL REGION: ICD-10-CM

## 2024-11-25 DIAGNOSIS — M54.50 LUMBAR BACK PAIN: Primary | ICD-10-CM

## 2024-11-25 PROCEDURE — 97110 THERAPEUTIC EXERCISES: CPT

## 2024-11-25 NOTE — PROGRESS NOTES
Physical Therapy Treatment Note and 30 Day Progress Note  [facility]  A department [facility]  [address], [place] [address]    Patient: Zena Zheng                                                 Visit Date: 2024  :     1980    Referring practitioner:    DARIO Nichole MD  Date of Initial Visit:          Type: THERAPY  Episode: LBP with radicular symptoms  Number of visits this episode: 6    Visit Diagnoses:    ICD-10-CM ICD-9-CM   1. Lumbar back pain  M54.50 724.2   2. Radiculopathy of lumbosacral region  M54.17 724.4     SUBJECTIVE     Subjective: She notices more pain when she isn't doing PT. Reports no change in the RLE s/s. She still has difficulty walking during her 8 hour shift and squatting.     PAIN: 6/10       OBJECTIVE     Objective       Therapeutic Exercises    76879 Units Comments   HEP assigned and performed     PN     Timed Minutes 30      Therapy Education/Self Care 80987   Education offered today HEP   Medbride Code [id number]   Ongoing HEP     Date: 2024  Prepared by: Sharri Clemons    Exercises  - Supine Bilateral Hip Internal Rotation Stretch  - 2-3 x daily - 7 x weekly - 4 reps - 30 sec hold  - Standing Lumbar Extension at Wall - Forearms  - 1 x daily - 4 x weekly - 2-3 sets - 10 reps  - Wall Squat  - 1 x daily - 4 x weekly - 2-3 sets - 10 reps  - Standing with Back Flat Against Wall  - 1 x daily - 4 x weekly - 2-3 sets - 10 reps   Timed Minutes        Total Timed Treatment:     30   mins  Total Time of Visit:             30   mins         ASSESSMENT/PLAN       GOALS  Goals                                                  Progress Note due by 24                                                              Recert due by 25   STG by: 4 weeks Comments Date Status   Improve rene thoracolumbar mobility  no change    ongoing   Improve muscle relaxation of posterior hip muscles  no change    ongoing    Improve  bilateral hip internal rotation to 35 deg or greater with no increase in pain  28 L 19 R  11/25  ongoing   LTG by: 8 weeks         Reports no radicular symptoms in bilateral LE for a week.  continued RLE s/s  11/25  ongoing   Able to perform job duties without exacerbation of pain  continued difficulty  11/25  ongoing   SLS left leg x 30 secs without pain  20 sec L, no pain; 10 sec R w/ pain  11/25  ongoing   Reports pain no greater than 4/10 when it does occur.  6/10 today 11/25 ongoing   Improve MOLBPDQ to 10 or less IE:  24/40 (48% disability)  11/25: 14  11/25  ongoing   Independent with HEP for flexibility and core/hip stability.  formally assigned today  11/25  ongoing      Anticipated CPT codes: Therapeutic Exercise 49600, Manual Therapy 95937, Therapeutic Activity 18880, Neuromuscular ReEducation 11348, Self Care/Home Management 48066, Estim Attended 39462, and Estim Unattended 61701      Assessment/Plan   Assessment  Impairments: abnormal or restricted ROM, impaired balance, impaired physical strength and lacks appropriate home exercise program   Functional limitations: lifting   Prognosis: good     Plan  Therapy options: will be seen for skilled therapy services  Planned modality interventions: dry needling, high voltage pulsed current (pain management), low level laser therapy, TENS and traction  Planned therapy interventions: abdominal trunk stabilization, body mechanics training, functional ROM exercises, home exercise program, therapeutic activities, strengthening, spinal/joint mobilization, soft tissue mobilization and neuromuscular re-education  Frequency: 2x week  Duration in weeks: 8  Treatment plan discussed with: patient    ASSESSMENT: Full session duration truncated d/t pt tardiness. Progress note performed per POC. She has not yet achieved any initial goals d/t continued lumbar pain, RLE radicular s/s, and difficulty performing functional and job-related tasks. However, she had not  yet been assigned an HEP and this was only her 5th tx session thus no significant changes expected. She has the propensity for anterior pelvic tilt during standing tasks performed today. She would benefit from continued skilled OP PT services to optimize functional capacity and QOL.     PLAN: progress POC per pt tolerance    SIGNATURE: Sharri Clemons, PT DPT, [place] License #: [id number]  Electronically Signed on 11/25/2024        [address]  [place] [address]  [phone]

## 2024-11-27 ENCOUNTER — HOSPITAL ENCOUNTER (EMERGENCY)
Facility: HOSPITAL | Age: 44
Discharge: HOME | End: 2024-11-27
Attending: FAMILY MEDICINE | Admitting: FAMILY MEDICINE
Payer: COMMERCIAL

## 2024-11-27 ENCOUNTER — APPOINTMENT (OUTPATIENT)
Dept: GENERAL RADIOLOGY | Facility: HOSPITAL | Age: 44
End: 2024-11-27
Payer: COMMERCIAL

## 2024-11-27 VITALS
DIASTOLIC BLOOD PRESSURE: 74 MMHG | RESPIRATION RATE: 18 BRPM | OXYGEN SATURATION: 100 % | HEIGHT: 66 IN | SYSTOLIC BLOOD PRESSURE: 119 MMHG | WEIGHT: 181 LBS | TEMPERATURE: 97.6 F | BODY MASS INDEX: 29.09 KG/M2 | HEART RATE: 71 BPM
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
## 2024-11-27 DIAGNOSIS — S43.422A SPRAIN OF LEFT ROTATOR CUFF CAPSULE, INITIAL ENCOUNTER: Primary | ICD-10-CM

## 2024-11-27 PROCEDURE — 99283 EMERGENCY DEPT VISIT LOW MDM: CPT

## 2024-11-27 PROCEDURE — 73030 X-RAY EXAM OF SHOULDER: CPT

## 2024-11-27 RX ORDER — DICLOFENAC SODIUM 75 MG/1
75 TABLET, DELAYED RELEASE ORAL 2 TIMES DAILY PRN
Qty: 60 TABLET | Refills: 0 | Status: SHIPPED | OUTPATIENT
Start: 2024-11-27 | End: 2024-12-27

## 2024-11-27 NOTE — Clinical Note
[facility] EMERGENCY DEPARTMENT  [address]  [facility] [address]  Phone: [phone]    Zena Zheng was seen and treated in our emergency department on 11/27/2024.  She may return to work on 11/30/2024.  Must see orthopedics.  Would recommend physical therapy for rehab.  If your shoulder continues to have problems would recommend MRI for further evaluation.       Thank you for choosing [facility].    Chucho Massey Jr., MD

## 2024-11-27 NOTE — ED PROVIDER NOTES
"HPI:    Patient is a 44-year-old white female who presents to the emergency room with a injury to the left shoulder.  She is a right-hand-dominant person who was walking on the deck and the deck wood broke through and she caught her arm on the banister catching herself.  She states that it jerked upwards and cause a lot of pain.  She states she could not sleep on it throughout the night and the pain got worse.  This occurred at 1700 last night.  She rates the pain with certain movements 9 out of 10.  At rest it is 7 out of 10.  Denies any previous injury to this left shoulder.  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  No chest pain shortness of breath or diaphoresis.  No head injury.  No headache or visual changes.  No neck upper or lower back injury.  No pelvis injury.  No lower extremity injuries bilaterally.      REVIEW OF SYSTEMS    CONSTITUTIONAL:  No complaints of fever, chills,or weakness  EYES:  No complaints of discharge   ENT: No complaints of sore throat or ear pain  CARDIOVASCULAR:  No complaints of chest pain, palpitations, or swelling  RESPIRATORY:  No complaints of cough or shortness of breath  GI:  No complaints of abdominal pain, nausea, vomiting, or diarrhea  MUSCULOSKELETAL: Positive for left shoulder injury.  SKIN:  No complaints of rash  NEUROLOGIC:  No complaints of headache, focal weakness, or sensory changes  ENDOCRINE:  No complaints of polyuria or polydipsia  LYMPHATIC:  No complaints of swollen glands  GENITOURINARY: No complaints of urinary frequency or hematuria        PAST MEDICAL HISTORY  Past Medical History:   Diagnosis Date    Anxiety     BV (bacterial vaginosis)     Depression     Fibromyalgia     Fibromyalgia, primary 07/2019    Gallbladder sludge 09/04/2024    Headache     Hypertension     Neuropathy     Scoliosis 09/2024    Tachycardia        FAMILY HISTORY  Family History   Problem Relation Age of Onset    Colon polyps Mother       "   < 60 years old    Arthritis Mother     Heart disease Mother     Stroke Father     No Known Problems Sister     No Known Problems Brother     No Known Problems Maternal Grandmother     No Known Problems Paternal Grandmother     No Known Problems Daughter     No Known Problems Son     No Known Problems Maternal Aunt     No Known Problems Paternal Aunt     BRCA 1/2 Neg Hx     Breast cancer Neg Hx     Colon cancer Neg Hx     Endometrial cancer Neg Hx     Ovarian cancer Neg Hx        SOCIAL HISTORY  Social History     Socioeconomic History    Marital status: Single   Tobacco Use    Smoking status: Never    Smokeless tobacco: Never   Vaping Use    Vaping status: Never Used   Substance and Sexual Activity    Alcohol use: Not Currently    Drug use: Never    Sexual activity: Yes     Partners: Male     Birth control/protection: Surgical       IMMUNIZATION HISTORY  Deferred to primary care physician.    SURGICAL HISTORY  Past Surgical History:   Procedure Laterality Date    ANKLE SURGERY      CHOLECYSTECTOMY N/A 10/01/2024    Procedure: LAPAROSCOPIC ROBOTIC ASSISTED CHOLECYSTECTOMY WTIH PRE-OPERATIVE INDOCYANINE GREEN INJECTION;  Surgeon: Jolly Dia MD;  Location: [facility] OR;  Service: Robotics - DaVinci;  Laterality: N/A;    COLONOSCOPY N/A 09/19/2024    Procedure: COLONOSCOPY WITH ANESTHESIA;  Surgeon: Rosa Anne MD;  Location: [facility] ENDOSCOPY;  Service: Gastroenterology;  Laterality: N/A;  pre screening  post polyp  pcp DARIO Nichole MD    DILATATION AND CURETTAGE      ENDOSCOPY N/A 12/04/2023    No endoscopic esophageal abnormality to explain patient's dysphagia. Esophagus dilated. Dilated. - There is no endoscopic evidence of Ingram's esophagus. - Normal stomach. - Normal examined duodenum. - No specimens collecte    TUBAL ABDOMINAL LIGATION      WISDOM TOOTH EXTRACTION  01/01/2021       CURRENT MEDICATIONS  No current facility-administered medications for this encounter.    Current Outpatient  Medications:     acetaminophen (Tylenol) 325 MG tablet, Take 3 tablets by mouth Every 8 (Eight) Hours. Take every 8 hours for 3 days then take prn as needed., Disp: , Rfl:     Azelastine HCl 137 MCG/SPRAY solution, SPRAY 1 TO 2 SPRAYS INTO EACH NOSTRIL TWICE A DAY AS NEEDED, Disp: , Rfl:     azithromycin (Zithromax Z-Arnold) 250 MG tablet, Take 2 tablets by mouth on day 1, then 1 tablet daily on days 2-5, Disp: 6 tablet, Rfl: 0    fluticasone (FLONASE) 50 MCG/ACT nasal spray, 1 spray by Each Nare route Daily., Disp: 15.8 mL, Rfl: 11    gabapentin (NEURONTIN) 300 MG capsule, Take 1 capsule by mouth 2 (Two) Times a Day., Disp: 60 capsule, Rfl: 5    ibuprofen (Motrin IB) 200 MG tablet, Take 3 tablets by mouth Every 8 (Eight) Hours. Take every 8 hours for three days then take as needed., Disp: , Rfl:     lamoTRIgine (LaMICtal) 25 MG tablet, 2 TABLETS NIGHTLY, Disp: , Rfl:     loratadine (Claritin) 10 MG tablet, Take 1 tablet by mouth Daily., Disp: , Rfl:     metoprolol succinate XL (TOPROL-XL) 25 MG 24 hr tablet, Take 1 tablet by mouth Daily., Disp: , Rfl:     metroNIDAZOLE (METROCREAM) 0.75 % cream, , Disp: , Rfl:     mometasone (ELOCON) 0.1 % solution, , Disp: , Rfl:     NON FORMULARY, Pt states that there are 2 more meds she takes name unknown for acne and rashs, Disp: , Rfl:     pimecrolimus (ELIDEL) 1 % cream, APPLY TO FULL FACE EVERY NIGHT, Disp: , Rfl:     tiZANidine (ZANAFLEX) 4 MG tablet, Take 1 tablet by mouth 2 (Two) Times a Day., Disp: , Rfl:     ALLERGIES  Allergies   Allergen Reactions    Adhesive Tape Rash    Amoxicillin Rash     Beta lactam allergy details  Antibiotic reaction: rash, hives  Age at reaction: adult  Dose to reaction time: (!) minutes  Reason for antibiotic: other  Epinephrine required for reaction?: no  Tolerated antibiotics: unknown        Clindamycin/Lincomycin Rash    Doxycycline Hives and Itching    Erythromycin Hives and Itching    Sulfa Antibiotics Hives and Itching    Tape Rash  "      Musculoskeletal exam    VITAL SIGNS:   /84 (BP Location: Right arm, Patient Position: Sitting)   Pulse 76   Temp 98 °F (36.7 °C) (Oral)   Resp 16   Ht 167.6 cm (66\")   Wt 82.1 kg (181 lb)   LMP 11/20/2024 (Exact Date)   SpO2 98%   BMI 29.21 kg/m²     Constitutional: Patient is alert and in no distress.  Patient with moderate to severe left shoulder discomfort.    Cardiovascular: S1-S2 regular rate and rhythm. No murmurs, rubs or gallops are noted.    Respiratory: Patient is clear to auscultation bilaterally with no wheezing or rhonchi.  Chest wall is nontender.  There are no external lesions on the chest.  There is no crepitance    Abdomen: Soft, nontender. Bowel sounds are normal in all 4 quadrants. There is no rebound or guarding noted.  There is no abdominal distention or hepatosplenomegaly.    Neck: No tenderness to palpation no step-off    Back: No tenderness to palpation of the thoracic or lumbar spines and no step-off    Musculoskeletal: Right shoulder full range of motion no injury no tenderness.  Left shoulder: There is tender palpation over the long head of the bicep.  As well as the left posterior shoulder.  Positive liftoff.  Pain with Valdivia and Neer sign.  No drop arm.  Positive open can sign.  Positive cross body abduction.  Positive speeds.  Negative Yergason's.  Negative sulcus sign.    RADIOLOGY/PROCEDURES        XR Shoulder 2+ View Left   Final Result   1. No acute fracture or dislocation.           This report was signed and finalized on 11/27/2024 7:57 AM by Dr. Eryn Baker MD.                 FUTURE APPOINTMENTS     Future Appointments   Date Time Provider Department Center   11/27/2024  3:30 PM Kenny Zheng PTA [facility]   12/19/2024  3:00 PM Lauren Wells APRN [facility] PAD   2/12/2025  2:15 PM Alicia Rankin APRN [facility] PAD            COURSE & MEDICAL DECISION MAKING    Patient's partial differential diagnosis can include:    Left " shoulder sprain, left shoulder rotator cuff tendinitis, left shoulder rotator cuff tear, SLAP tear, subluxation injury, and others       No acute fractures noted.  I believe this patient has a rotator cuff injury.  Will refer the patient to orthopedics.  Recommend that the patient undergo physical therapy and treatment.      Patient's level of risk: Moderate      CRITICAL CARE    CRITICAL CARE: No    CRITICAL CARE TIME: None      No results found for this or any previous visit (from the past 24 hours).    Also  Old charts were reviewed per [facility] EMR.  Pertinent details are summarized above.  All laboratory, radiologic, and EKG studies that were performed in the Emergency Department were a necessary part of the evaluation needed to exclude unstable or  emergent medical conditions.     Patient was hemodynamically and neurologically stable in the ED.   Pertinent studies were reviewed as above.     The patient received:  Medications - No data to display         ED Disposition       ED Disposition   Discharge    Condition   Stable    Comment   --                 Dragon disclaimer:  Part of this note may be an electronic transcription/translation of spoken language to printed text using the Dragon Dictation System.     I have reviewed the patient’s prescription history via a prescription monitoring program.  This information is consistent with my knowledge of the patient’s controlled substance use history.    Patient evaluated during Coronavirus Pandemic. Isolation practices followed according to [facility] policy.    FINAL IMPRESSION   Diagnosis Plan   1. Sprain of left rotator cuff capsule, initial encounter        2. Fall on same level, initial encounter              MD Bryson Ruiz Jr, Thomas Mark Jr., MD  11/27/24 0800

## 2024-12-04 ENCOUNTER — OFFICE VISIT (OUTPATIENT)
Age: 44
End: 2024-12-04
Payer: COMMERCIAL

## 2024-12-04 VITALS — HEIGHT: 66 IN | WEIGHT: 177.8 LBS | BODY MASS INDEX: 28.57 KG/M2

## 2024-12-04 DIAGNOSIS — S46.912A LEFT SHOULDER STRAIN, INITIAL ENCOUNTER: Primary | ICD-10-CM

## 2024-12-04 PROCEDURE — 99204 OFFICE O/P NEW MOD 45 MIN: CPT | Performed by: ORTHOPAEDIC SURGERY

## 2024-12-04 NOTE — PROGRESS NOTES
Orthopaedic Clinic Note    NAME:  Roxann Colindres   : 1980  MRN: [id number]      2024     CHIEF COMPLAINT:  Left shoulder pain      HISTORY OF PRESENT ILLNESS:   Roxann is a 44 y.o. female who presents to the office for evaluation and treatment of the left shoulder. She states that she initially injured the left shoulder approximately a year ago while exercising. She was followed by her primary care who initially recommended physical therapy.  However she did not follow through with this and did not at home program.  She is complaining of burning pain in her arm since this time.  Her most recent injury occurred on 2024 when she fell through a porch.  She states that she landed on her feet but her left arm caught on the porch in an abducted position. She proceeded to go to the ED where they performed imaging and placed her in a sling with a prescription for Diclofenac. She states that she has continued to get better, pain has decreased slightly. However, she does still experience pain globally with ROM.  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  She does not feel as though she can intervene if there was a confrontation.    Past Medical History:        Diagnosis Date    Abnormal Pap smear of cervix     Allergic     Anemia     with pregnancy    ASCUS favor benign 10/2015    Depression     Fibromyalgia     Recurrent miscarriages        Past Surgical History:        Procedure Laterality Date    ABDOMEN SURGERY  10-1-24    ANKLE SURGERY Left     aprox in     DENTAL SURGERY      Tooth Implant    DILATION AND CURETTAGE OF UTERUS      LEEP N/A 2020    LEEP performed by Chica Copeland MD at [facility] OR    SALPINGECTOMY Bilateral     Dr. Burrell       Current Medications:   Prior to Admission medications    Medication Sig Start Date End Date Taking? Authorizing Provider   DULoxetine (CYMBALTA) 30 MG extended release capsule Take 1 capsule

## 2024-12-12 ENCOUNTER — TELEPHONE (OUTPATIENT)
Age: 44
End: 2024-12-12

## 2024-12-12 NOTE — TELEPHONE ENCOUNTER
Called patient to let her know that her [place] paperwork was completed, verified the Email that she wanted it sent to.

## 2024-12-17 ENCOUNTER — TELEPHONE (OUTPATIENT)
Age: 44
End: 2024-12-17

## 2024-12-17 DIAGNOSIS — S46.912A LEFT SHOULDER STRAIN, INITIAL ENCOUNTER: Primary | ICD-10-CM

## 2024-12-17 RX ORDER — DICLOFENAC SODIUM 75 MG/1
75 TABLET, DELAYED RELEASE ORAL 2 TIMES DAILY
Qty: 60 TABLET | Refills: 3 | Status: SHIPPED | OUTPATIENT
Start: 2024-12-17

## 2024-12-17 NOTE — TELEPHONE ENCOUNTER
Patient was prescribed Diclofenac 75mg #60 at her emergency visit to [facility] on 11/27/2024. Patient was seen by Dr. Dillard 12/04/2024. Patient is requesting a refill along with wanting to know if there is anything else she can be prescribed for her pain.     I have pended Diclofenac Rx for Dr. Dillard to sign and send to the pharmacy if appropriate.   Dr. Dillard will have to add any other medication if he decides to send in something for patient's pain.

## 2024-12-17 NOTE — TELEPHONE ENCOUNTER
Pt needs a refill on Diclofenac 75mg   [facility] [address] [place]   Pt wants to know if you can give her anything besides this for pain. She states even when taking this she's still in pain,

## 2024-12-18 ENCOUNTER — HOSPITAL ENCOUNTER (OUTPATIENT)
Dept: CARDIOLOGY | Facility: HOSPITAL | Age: 44
Discharge: HOME | End: 2024-12-18
Admitting: EMERGENCY MEDICINE
Payer: COMMERCIAL

## 2024-12-18 DIAGNOSIS — R00.2 PALPITATIONS: ICD-10-CM

## 2024-12-18 PROCEDURE — 93246 EXT ECG>7D<15D RECORDING: CPT

## 2024-12-19 ENCOUNTER — OFFICE VISIT (OUTPATIENT)
Dept: INTERNAL MEDICINE | Facility: CLINIC | Age: 44
End: 2024-12-19
Payer: COMMERCIAL

## 2024-12-19 ENCOUNTER — LAB (OUTPATIENT)
Dept: LAB | Facility: HOSPITAL | Age: 44
End: 2024-12-19
Payer: COMMERCIAL

## 2024-12-19 VITALS
OXYGEN SATURATION: 100 % | WEIGHT: 177 LBS | SYSTOLIC BLOOD PRESSURE: 126 MMHG | HEART RATE: 74 BPM | HEIGHT: 66 IN | BODY MASS INDEX: 28.45 KG/M2 | DIASTOLIC BLOOD PRESSURE: 82 MMHG

## 2024-12-19 DIAGNOSIS — M79.7 FIBROMYALGIA: ICD-10-CM

## 2024-12-19 DIAGNOSIS — Z00.01 ANNUAL VISIT FOR GENERAL ADULT MEDICAL EXAMINATION WITH ABNORMAL FINDINGS: Primary | ICD-10-CM

## 2024-12-19 DIAGNOSIS — Z13.31 DEPRESSION SCREEN: ICD-10-CM

## 2024-12-19 DIAGNOSIS — R68.84 JAW PAIN: ICD-10-CM

## 2024-12-19 DIAGNOSIS — Z13.6 HYPERTENSION SCREEN: ICD-10-CM

## 2024-12-19 DIAGNOSIS — Z00.01 ANNUAL VISIT FOR GENERAL ADULT MEDICAL EXAMINATION WITH ABNORMAL FINDINGS: ICD-10-CM

## 2024-12-19 LAB
ALBUMIN SERPL-MCNC: 4.6 G/DL (ref 3.5–5.2)
ALBUMIN/GLOB SERPL: 1.8 G/DL
ALP SERPL-CCNC: 57 U/L (ref 39–117)
ALT SERPL W P-5'-P-CCNC: 12 U/L (ref 1–33)
ANION GAP SERPL CALCULATED.3IONS-SCNC: 10 MMOL/L (ref 5–15)
AST SERPL-CCNC: 12 U/L (ref 1–32)
BILIRUB SERPL-MCNC: 0.6 MG/DL (ref 0–1.2)
BUN SERPL-MCNC: 6 MG/DL (ref 6–20)
BUN/CREAT SERPL: 8.5 (ref 7–25)
CALCIUM SPEC-SCNC: 9.5 MG/DL (ref 8.6–10.5)
CHLORIDE SERPL-SCNC: 106 MMOL/L (ref 98–107)
CHOLEST SERPL-MCNC: 194 MG/DL (ref 0–200)
CO2 SERPL-SCNC: 26 MMOL/L (ref 22–29)
CREAT SERPL-MCNC: 0.71 MG/DL (ref 0.57–1)
DEPRECATED RDW RBC AUTO: 43.8 FL (ref 37–54)
EGFRCR SERPLBLD CKD-EPI 2021: 107 ML/MIN/1.73
ERYTHROCYTE [DISTWIDTH] IN BLOOD BY AUTOMATED COUNT: 13 % (ref 12.3–15.4)
GLOBULIN UR ELPH-MCNC: 2.6 GM/DL
GLUCOSE SERPL-MCNC: 92 MG/DL (ref 65–99)
HBA1C MFR BLD: 5.3 % (ref 4.8–5.6)
HCT VFR BLD AUTO: 38.8 % (ref 34–46.6)
HDLC SERPL-MCNC: 52 MG/DL (ref 40–60)
HGB BLD-MCNC: 12.6 G/DL (ref 12–15.9)
LDLC SERPL CALC-MCNC: 125 MG/DL (ref 0–100)
LDLC/HDLC SERPL: 2.38 {RATIO}
MCH RBC QN AUTO: 29.8 PG (ref 26.6–33)
MCHC RBC AUTO-ENTMCNC: 32.5 G/DL (ref 31.5–35.7)
MCV RBC AUTO: 91.7 FL (ref 79–97)
PLATELET # BLD AUTO: 367 10*3/MM3 (ref 140–450)
PMV BLD AUTO: 10.5 FL (ref 6–12)
POTASSIUM SERPL-SCNC: 4 MMOL/L (ref 3.5–5.2)
PROT SERPL-MCNC: 7.2 G/DL (ref 6–8.5)
RBC # BLD AUTO: 4.23 10*6/MM3 (ref 3.77–5.28)
SODIUM SERPL-SCNC: 142 MMOL/L (ref 136–145)
TRIGL SERPL-MCNC: 91 MG/DL (ref 0–150)
VLDLC SERPL-MCNC: 17 MG/DL (ref 5–40)
WBC NRBC COR # BLD AUTO: 7.5 10*3/MM3 (ref 3.4–10.8)

## 2024-12-19 PROCEDURE — 80053 COMPREHEN METABOLIC PANEL: CPT

## 2024-12-19 PROCEDURE — 80061 LIPID PANEL: CPT

## 2024-12-19 PROCEDURE — 85027 COMPLETE CBC AUTOMATED: CPT

## 2024-12-19 PROCEDURE — 99396 PREV VISIT EST AGE 40-64: CPT

## 2024-12-19 PROCEDURE — 36415 COLL VENOUS BLD VENIPUNCTURE: CPT

## 2024-12-19 PROCEDURE — 83036 HEMOGLOBIN GLYCOSYLATED A1C: CPT

## 2024-12-19 PROCEDURE — 99214 OFFICE O/P EST MOD 30 MIN: CPT

## 2024-12-19 NOTE — PROGRESS NOTES
Chief Complaint   Patient presents with    Annual Exam         History:  Zena Zheng is a 44 y.o. female who presents today for evaluation of the above problems.      HPI  History of Present Illness  The patient is a 44-year-old female who presents for her annual exam.    She has been experiencing shoulder pain since an injury sustained during exercise in June or July 2023. The pain persisted throughout the year and was exacerbated by a fall where her arm was pulled upward. Initially, she experienced improved mobility, but the following day, the pain intensified. She is currently under the care of Dr. Ko, an orthopedic specialist.    She reports discomfort in her right jaw, which has been evaluated by a dentist. A 3D print bite guard was provided to prevent teeth grinding at night, which has somewhat alleviated the pain. However, she experiences a knot-like sensation and pain on the affected side, particularly when removing the guard in the morning. She does not report any clicking in the jaw but notes that she must exert force to bring her teeth together and experiences pain when fully opening her mouth. Over-the-counter bite guards were previously used but were found to be uncomfortable and often dislodged during sleep. The dentist attributed the pain to nocturnal teeth grinding.    She maintains a healthy diet. She has not engaged in regular exercise since her shoulder injury but was previously active. She abstains from alcohol, tobacco, vaping, and drug use. Her last eye exam was conducted in February 2024. She underwent a Pap smear in September 2024. She has had a colposcopy in the past. She is currently wearing a monitor prescribed by her cardiologist but reports a reaction to the adhesive, which began itching after 24 hours. She has been attempting to avoid scratching it. She has an upcoming echocardiogram scheduled.     She has been taking tizanidine twice daily for fibromyalgia and joint pain,  which was prescribed by Dr. Last. She frequently experiences muscle knots in her shoulders.    SOCIAL HISTORY  She does not use alcohol, tobacco, vape, or drugs.    ALLERGIES  She is allergic to ADHESIVE.    MEDICATIONS  Current: tizanidine    Social History     Socioeconomic History    Marital status: Single   Tobacco Use    Smoking status: Never    Smokeless tobacco: Never   Vaping Use    Vaping status: Never Used   Substance and Sexual Activity    Alcohol use: Not Currently    Drug use: Never    Sexual activity: Yes     Partners: Male     Birth control/protection: Surgical       PHQ-9 Depression Screening  Little interest or pleasure in doing things? Several days   Feeling down, depressed, or hopeless? Not at all   PHQ-2 Total Score 1   Trouble falling or staying asleep, or sleeping too much?     Feeling tired or having little energy?     Poor appetite or overeating?     Feeling bad about yourself - or that you are a failure or have let yourself or your family down?     Trouble concentrating on things, such as reading the newspaper or watching television?     Moving or speaking so slowly that other people could have noticed? Or the opposite - being so fidgety or restless that you have been moving around a lot more than usual?     Thoughts that you would be better off dead, or of hurting yourself in some way?     PHQ-9 Total Score     If you checked off any problems, how difficult have these problems made it for you to do your work, take care of things at home, or get along with other people? Not difficult at all       PHQ-9 Total Score:      ROS:  Review of Systems   HENT:  Negative for dental problem, ear pain and facial swelling.    Respiratory:  Negative for chest tightness and shortness of breath.    Cardiovascular:  Negative for chest pain and palpitations.   Musculoskeletal:  Positive for myalgias.         Current Outpatient Medications:     acetaminophen (Tylenol) 325 MG tablet, Take 3 tablets by mouth  Every 8 (Eight) Hours. Take every 8 hours for 3 days then take prn as needed., Disp: , Rfl:     Azelastine HCl 137 MCG/SPRAY solution, SPRAY 1 TO 2 SPRAYS INTO EACH NOSTRIL TWICE A DAY AS NEEDED, Disp: , Rfl:     diclofenac (VOLTAREN) 75 MG EC tablet, Take 1 tablet by mouth 2 (Two) Times a Day As Needed (Moderate pain) for up to 30 days., Disp: 60 tablet, Rfl: 0    DULoxetine (CYMBALTA) 30 MG capsule, Take 1 capsule by mouth Every Morning., Disp: , Rfl:     fluticasone (FLONASE) 50 MCG/ACT nasal spray, 1 spray by Each Nare route Daily., Disp: 15.8 mL, Rfl: 11    gabapentin (NEURONTIN) 300 MG capsule, Take 1 capsule by mouth 2 (Two) Times a Day., Disp: 60 capsule, Rfl: 5    lamoTRIgine (LaMICtal) 25 MG tablet, 2 TABLETS NIGHTLY, Disp: , Rfl:     loratadine (Claritin) 10 MG tablet, Take 1 tablet by mouth Daily., Disp: , Rfl:     Metoprolol Succinate 25 MG capsule extended-release 24 hour sprinkle, Metoprolol Succinate, Disp: , Rfl:     mometasone (ELOCON) 0.1 % solution, , Disp: , Rfl:     NON FORMULARY, Pt states that there are 2 more meds she takes name unknown for acne and rashs, Disp: , Rfl:     pimecrolimus (ELIDEL) 1 % cream, APPLY TO FULL FACE EVERY NIGHT, Disp: , Rfl:     tiZANidine (ZANAFLEX) 4 MG tablet, Take 1 tablet by mouth 2 (Two) Times a Day., Disp: 60 tablet, Rfl: 3    Lab Results   Component Value Date    GLUCOSE 92 12/19/2024    BUN 6 12/19/2024    CREATININE 0.71 12/19/2024     12/19/2024    K 4.0 12/19/2024     12/19/2024    CALCIUM 9.5 12/19/2024    PROTEINTOT 7.2 12/19/2024    ALBUMIN 4.6 12/19/2024    ALT 12 12/19/2024    AST 12 12/19/2024    ALKPHOS 57 12/19/2024    BILITOT 0.6 12/19/2024    GLOB 2.6 12/19/2024    AGRATIO 1.8 12/19/2024    BCR 8.5 12/19/2024    ANIONGAP 10.0 12/19/2024    EGFR 107.0 12/19/2024       WBC   Date Value Ref Range Status   12/19/2024 7.50 3.40 - 10.80 10*3/mm3 Final   02/21/2023 10.4 4.8 - 10.8 K/uL Final     RBC   Date Value Ref Range Status    12/19/2024 4.23 3.77 - 5.28 10*6/mm3 Final   02/21/2023 4.47 4.20 - 5.40 M/uL Final     Hemoglobin   Date Value Ref Range Status   12/19/2024 12.6 12.0 - 15.9 g/dL Final   02/21/2023 13.5 12.0 - 16.0 g/dL Final     Hematocrit   Date Value Ref Range Status   12/19/2024 38.8 34.0 - 46.6 % Final   02/21/2023 41.9 37.0 - 47.0 % Final     MCV   Date Value Ref Range Status   12/19/2024 91.7 79.0 - 97.0 fL Final   02/21/2023 93.7 81.0 - 99.0 fL Final     MCH   Date Value Ref Range Status   12/19/2024 29.8 26.6 - 33.0 pg Final   02/21/2023 30.2 27.0 - 31.0 pg Final     MCHC   Date Value Ref Range Status   12/19/2024 32.5 31.5 - 35.7 g/dL Final   02/21/2023 32.2 (L) 33.0 - 37.0 g/dL Final     RDW   Date Value Ref Range Status   12/19/2024 13.0 12.3 - 15.4 % Final   02/21/2023 13.2 11.5 - 14.5 % Final     RDW-SD   Date Value Ref Range Status   12/19/2024 43.8 37.0 - 54.0 fl Final     MPV   Date Value Ref Range Status   12/19/2024 10.5 6.0 - 12.0 fL Final   02/21/2023 10.4 9.4 - 12.3 fL Final     Platelets   Date Value Ref Range Status   12/19/2024 367 140 - 450 10*3/mm3 Final   02/21/2023 331 130 - 400 K/uL Final     Neutrophil Rel %   Date Value Ref Range Status   02/21/2023 67.7 (H) 50.0 - 65.0 % Final     Neutrophil %   Date Value Ref Range Status   10/18/2024 63.2 42.7 - 76.0 % Final     Lymphocyte Rel %   Date Value Ref Range Status   02/21/2023 26.2 20.0 - 40.0 % Final     Lymphocyte %   Date Value Ref Range Status   10/18/2024 27.9 19.6 - 45.3 % Final     Monocyte Rel %   Date Value Ref Range Status   02/21/2023 5.4 0.0 - 10.0 % Final     Monocyte %   Date Value Ref Range Status   10/18/2024 6.9 5.0 - 12.0 % Final     Eosinophil Rel %   Date Value Ref Range Status   02/21/2023 0.1 0.0 - 5.0 % Final     Eosinophil %   Date Value Ref Range Status   10/18/2024 1.0 0.3 - 6.2 % Final     Basophil Rel %   Date Value Ref Range Status   02/21/2023 0.4 0.0 - 1.0 % Final     Basophil %   Date Value Ref Range Status  "  10/18/2024 0.8 0.0 - 1.5 % Final     Immature Grans %   Date Value Ref Range Status   10/18/2024 0.2 0.0 - 0.5 % Final     Neutrophils Absolute   Date Value Ref Range Status   02/21/2023 7.0 1.5 - 7.5 K/uL Final     Neutrophils, Absolute   Date Value Ref Range Status   10/18/2024 5.77 1.70 - 7.00 10*3/mm3 Final     Lymphocytes Absolute   Date Value Ref Range Status   02/21/2023 2.7 1.1 - 4.5 K/uL Final     Lymphocytes, Absolute   Date Value Ref Range Status   10/18/2024 2.55 0.70 - 3.10 10*3/mm3 Final     Monocytes Absolute   Date Value Ref Range Status   02/21/2023 0.60 0.00 - 0.90 K/uL Final     Monocytes, Absolute   Date Value Ref Range Status   10/18/2024 0.63 0.10 - 0.90 10*3/mm3 Final     Eosinophils Absolute   Date Value Ref Range Status   02/21/2023 0.00 0.00 - 0.60 K/uL Final     Eosinophils, Absolute   Date Value Ref Range Status   10/18/2024 0.09 0.00 - 0.40 10*3/mm3 Final     Basophils Absolute   Date Value Ref Range Status   02/21/2023 0.00 0.00 - 0.20 K/uL Final     Basophils, Absolute   Date Value Ref Range Status   10/18/2024 0.07 0.00 - 0.20 10*3/mm3 Final     Immature Grans, Absolute   Date Value Ref Range Status   10/18/2024 0.02 0.00 - 0.05 10*3/mm3 Final   02/21/2023 0.0 K/uL Final     nRBC   Date Value Ref Range Status   10/18/2024 0.0 0.0 - 0.2 /100 WBC Final       OBJECTIVE:  Visit Vitals  /82 (BP Location: Left arm, Patient Position: Sitting, Cuff Size: Adult)   Pulse 74   Ht 167.6 cm (66\")   Wt 80.3 kg (177 lb)   LMP 11/20/2024 (Exact Date)   SpO2 100%   BMI 28.57 kg/m²      Physical Exam  Constitutional:       Appearance: Normal appearance.   HENT:      Head: Normocephalic.      Jaw: Tenderness present. No pain on movement.        Right Ear: Tympanic membrane normal.      Left Ear: Tympanic membrane normal.      Nose: Nose normal.      Mouth/Throat:      Mouth: Mucous membranes are moist.   Cardiovascular:      Rate and Rhythm: Normal rate and regular rhythm.      Pulses: Normal " pulses.      Heart sounds: Normal heart sounds.   Pulmonary:      Effort: Pulmonary effort is normal.      Breath sounds: Normal breath sounds.   Abdominal:      General: Bowel sounds are normal.      Palpations: Abdomen is soft.   Musculoskeletal:         General: Normal range of motion.      Cervical back: Normal range of motion.   Lymphadenopathy:      Cervical: No cervical adenopathy.   Skin:     General: Skin is warm and dry.   Neurological:      Mental Status: She is alert and oriented to person, place, and time.   Psychiatric:         Mood and Affect: Mood normal.         Behavior: Behavior normal.         Thought Content: Thought content normal.         Judgment: Judgment normal.       Physical Exam  Lungs are clear.    Vital Signs  Blood pressure is normal. Oxygen saturation is normal.    Results      Assessment/Plan    Diagnoses and all orders for this visit:    1. Annual visit for general adult medical examination with abnormal findings (Primary)  -     Comprehensive Metabolic Panel; Future  -     CBC (No Diff); Future  -     Lipid Panel; Future  -     Hemoglobin A1c; Future    2. Fibromyalgia  -     tiZANidine (ZANAFLEX) 4 MG tablet; Take 1 tablet by mouth 2 (Two) Times a Day.  Dispense: 60 tablet; Refill: 3    3. Hypertension screen    4. Depression screen    5. Jaw pain      Assessment & Plan  1. Right shoulder pain.  The patient reports ongoing pain in the right shoulder since an injury in June or July 2023, exacerbated by a fall. She is currently under the care of Dr. Ko, an orthopedic specialist.    2. Right-sided jaw pain.  The patient experiences right-sided jaw pain, which has been partially alleviated by a custom 3D-printed bite guard. The pain is likely due to nocturnal bruxism. She is advised to follow up with her dentist to evaluate the effectiveness of the bite guard and make any necessary adjustments. If the dentist determines that the issue is not dental-related, further evaluation  will be considered.    3. Health maintenance.  The patient is up to date on her dental and eye exams, with the last eye exam conducted in February 2024. Her last Pap smear was in September 2024.Her blood pressure and oxygen levels are within normal ranges. Depression screening yielded negative results. Routine lab work has been ordered. She is scheduled for an echocardiogram. She is advised to keep the Holter monitor on for as long as possible to obtain accurate results.    4. Fibromyalgia.  The patient has been taking tizanidine for fibromyalgia and joint pain. A prescription for tizanidine 4 mg, to be taken twice daily, has been provided for a duration of 30 days.    Hypertension screen negative per J[employer] 8 guidelines of less than 140/90, blood pressure today is 126/82    Follow-up  The patient will follow up in 6 months.    Counseling/Anticipatory Guidance Discussed: nutrition, family planning/contraception, physical activity, healthy weight, injury prevention, misuse of tobacco, alcohol and drugs, sexual behavior and STDs, dental health, mental health, immunizations, and breast cancer and self breast exams      Return in about 6 months (around 6/19/2025) for Next scheduled follow up.    Patient was given instructions and counseling regarding his/her condition or for health maintenance advice. Please see specific information pulled into the AVS if appropriate.     AARON Hummel   15:12 CST  12/19/2024   Electronically signed    Patient or patient representative verbalized consent for the use of Ambient Listening during the visit with  AARON Hummel for chart documentation. 12/19/2024  18:40 CST

## 2024-12-20 ENCOUNTER — HOSPITAL ENCOUNTER (OUTPATIENT)
Dept: CARDIOLOGY | Facility: HOSPITAL | Age: 44
Discharge: HOME | End: 2024-12-20
Admitting: EMERGENCY MEDICINE
Payer: COMMERCIAL

## 2024-12-20 VITALS — WEIGHT: 176.37 LBS | BODY MASS INDEX: 28.34 KG/M2 | HEIGHT: 66 IN

## 2024-12-20 DIAGNOSIS — R00.2 PALPITATIONS: ICD-10-CM

## 2024-12-20 PROCEDURE — 93306 TTE W/DOPPLER COMPLETE: CPT

## 2024-12-20 PROCEDURE — 93356 MYOCRD STRAIN IMG SPCKL TRCK: CPT

## 2024-12-23 LAB
AV MEAN PRESS GRAD SYS DOP V1V2: 3.5 MMHG
AV VMAX SYS DOP: 126.4 CM/SEC
BH CV ECHO LEFT VENTRICLE GLOBAL LONGITUDINAL STRAIN: -24 %
BH CV ECHO MEAS - AO MAX PG: 6.4 MMHG
BH CV ECHO MEAS - AO ROOT DIAM: 3 CM
BH CV ECHO MEAS - AO V2 VTI: 27.8 CM
BH CV ECHO MEAS - AVA(I,D): 3.3 CM2
BH CV ECHO MEAS - EDV(CUBED): 149.5 ML
BH CV ECHO MEAS - ESV(CUBED): 42.5 ML
BH CV ECHO MEAS - FS: 34.2 %
BH CV ECHO MEAS - IVS/LVPW: 0.72 CM
BH CV ECHO MEAS - IVSD: 0.55 CM
BH CV ECHO MEAS - LA DIMENSION: 3.3 CM
BH CV ECHO MEAS - LAT PEAK E' VEL: 18 CM/SEC
BH CV ECHO MEAS - LV MASS(C)D: 117.8 GRAMS
BH CV ECHO MEAS - LV MAX PG: 3.4 MMHG
BH CV ECHO MEAS - LV MEAN PG: 1.92 MMHG
BH CV ECHO MEAS - LV V1 MAX: 92 CM/SEC
BH CV ECHO MEAS - LV V1 VTI: 19.2 CM
BH CV ECHO MEAS - LVIDD: 5.3 CM
BH CV ECHO MEAS - LVIDS: 3.5 CM
BH CV ECHO MEAS - LVOT AREA: 4.8 CM2
BH CV ECHO MEAS - LVOT DIAM: 2.47 CM
BH CV ECHO MEAS - LVPWD: 0.77 CM
BH CV ECHO MEAS - MED PEAK E' VEL: 10 CM/SEC
BH CV ECHO MEAS - MV A MAX VEL: 71.8 CM/SEC
BH CV ECHO MEAS - MV DEC SLOPE: 330.5 CM/SEC2
BH CV ECHO MEAS - MV DEC TIME: 0.22 SEC
BH CV ECHO MEAS - MV E MAX VEL: 70.4 CM/SEC
BH CV ECHO MEAS - MV E/A: 0.98
BH CV ECHO MEAS - PA V2 MAX: 91 CM/SEC
BH CV ECHO MEAS - RV MAX PG: 1.52 MMHG
BH CV ECHO MEAS - RV V1 MAX: 61.6 CM/SEC
BH CV ECHO MEAS - RV V1 VTI: 14.7 CM
BH CV ECHO MEAS - RVDD: 2.9 CM
BH CV ECHO MEAS - SV(LVOT): 92.2 ML
BH CV ECHO MEAS - SVI(LVOT): 48.5 ML/M2
BH CV ECHO MEAS - TAPSE (>1.6): 2.8 CM
BH CV ECHO MEAS - TR MAX PG: 3.6 MMHG
BH CV ECHO MEAS - TR MAX VEL: 95 CM/SEC
BH CV ECHO MEASUREMENTS AVERAGE E/E' RATIO: 5.03
BH CV XLRA - RV BASE: 3.2 CM
BH CV XLRA - RV MID: 2.5 CM
BH CV XLRA - TDI S': 21 CM/SEC
LEFT ATRIUM VOLUME INDEX: 22 ML/M2
LEFT ATRIUM VOLUME: 41 ML

## 2024-12-25 ENCOUNTER — HOSPITAL ENCOUNTER (EMERGENCY)
Facility: HOSPITAL | Age: 44
Discharge: HOME | End: 2024-12-25
Attending: EMERGENCY MEDICINE
Payer: COMMERCIAL

## 2024-12-25 VITALS
RESPIRATION RATE: 16 BRPM | HEART RATE: 70 BPM | DIASTOLIC BLOOD PRESSURE: 81 MMHG | TEMPERATURE: 98.3 F | OXYGEN SATURATION: 98 % | HEIGHT: 67 IN | SYSTOLIC BLOOD PRESSURE: 136 MMHG | BODY MASS INDEX: 29.35 KG/M2 | WEIGHT: 187 LBS
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
## 2024-12-25 DIAGNOSIS — S61.412A LACERATION OF LEFT HAND WITHOUT FOREIGN BODY, INITIAL ENCOUNTER: ICD-10-CM

## 2024-12-25 PROCEDURE — 90715 TDAP VACCINE 7 YRS/> IM: CPT | Performed by: NURSE PRACTITIONER

## 2024-12-25 PROCEDURE — 90471 IMMUNIZATION ADMIN: CPT | Performed by: NURSE PRACTITIONER

## 2024-12-25 PROCEDURE — 99283 EMERGENCY DEPT VISIT LOW MDM: CPT

## 2024-12-25 PROCEDURE — 25010000002 LIDOCAINE 1 % SOLUTION: Performed by: NURSE PRACTITIONER

## 2024-12-25 PROCEDURE — 99282 EMERGENCY DEPT VISIT SF MDM: CPT

## 2024-12-25 PROCEDURE — 25010000002 TETANUS-DIPHTH-ACELL PERTUSSIS 5-2.5-18.5 LF-MCG/0.5 SUSPENSION PREFILLED SYRINGE: Performed by: NURSE PRACTITIONER

## 2024-12-25 RX ORDER — HYDROCODONE BITARTRATE AND ACETAMINOPHEN 5; 325 MG/1; MG/1
1 TABLET ORAL EVERY 6 HOURS PRN
Qty: 15 TABLET | Refills: 0 | Status: SHIPPED | OUTPATIENT
Start: 2024-12-25 | End: 2025-01-14

## 2024-12-25 RX ORDER — LIDOCAINE HYDROCHLORIDE 10 MG/ML
10 INJECTION, SOLUTION INFILTRATION; PERINEURAL ONCE
Status: COMPLETED | OUTPATIENT
Start: 2024-12-25 | End: 2024-12-25

## 2024-12-25 RX ADMIN — LIDOCAINE HYDROCHLORIDE 10 ML: 10 INJECTION, SOLUTION INFILTRATION; PERINEURAL at 11:39

## 2024-12-25 RX ADMIN — TETANUS TOXOID, REDUCED DIPHTHERIA TOXOID AND ACELLULAR PERTUSSIS VACCINE, ADSORBED 0.5 ML: 5; 2.5; 8; 8; 2.5 SUSPENSION INTRAMUSCULAR at 12:15

## 2024-12-25 NOTE — DISCHARGE INSTRUCTIONS
Return to ER if symptoms worsen   Return to er in 10 days for suture removal, return before if has redness, swelling, drainage from the wound

## 2024-12-25 NOTE — ED PROVIDER NOTES
Subjective   History of Present Illness  Patient is a 44-year-old female presents emergency department with dog bite to the left hand.  She states she was at her sister's home and her sisters dog bit her.  The dog is up-to-date on its immunizations.  The patient is right hand dominant.  There is no other injury.  There is a puncture wound to the palmar aspect of the left hand.  Patient is unsure of her last tetanus immunization.    History provided by:  Patient   used: No        Review of Systems   Constitutional: Negative.    HENT: Negative.     Eyes: Negative.    Respiratory: Negative.     Cardiovascular: Negative.    Gastrointestinal: Negative.    Endocrine: Negative.    Genitourinary: Negative.    Musculoskeletal:         Patient is a 44-year-old female presents emergency department with dog bite to the left hand.  She states she was at her sister's home and her sisters dog bit her.  The dog is up-to-date on its immunizations.  The patient is right hand dominant.  There is no other injury.  There is a puncture wound to the palmar aspect of the left hand.  Patient is unsure of her last tetanus immunization.     Skin: Negative.    Allergic/Immunologic: Negative.    Neurological: Negative.    Hematological: Negative.    Psychiatric/Behavioral: Negative.     All other systems reviewed and are negative.      Past Medical History:   Diagnosis Date    Anxiety     BV (bacterial vaginosis)     Depression     Fibromyalgia     Fibromyalgia, primary 07/2019    Gallbladder sludge 09/04/2024    Headache     Hypertension     Neuropathy     Scoliosis 09/2024    Tachycardia        Allergies   Allergen Reactions    Adhesive Tape Rash    Amoxicillin Rash     Beta lactam allergy details  Antibiotic reaction: rash, hives  Age at reaction: adult  Dose to reaction time: (!) minutes  Reason for antibiotic: other  Epinephrine required for reaction?: no  Tolerated antibiotics: unknown        Clindamycin/Lincomycin  Rash    Doxycycline Hives and Itching    Erythromycin Hives and Itching    Sulfa Antibiotics Hives and Itching    Tape Rash       Past Surgical History:   Procedure Laterality Date    ANKLE SURGERY      CHOLECYSTECTOMY N/A 10/01/2024    Procedure: LAPAROSCOPIC ROBOTIC ASSISTED CHOLECYSTECTOMY WTIH PRE-OPERATIVE INDOCYANINE GREEN INJECTION;  Surgeon: Jolly Dia MD;  Location: [facility] OR;  Service: Robotics - DaVinci;  Laterality: N/A;    COLONOSCOPY N/A 09/19/2024    Procedure: COLONOSCOPY WITH ANESTHESIA;  Surgeon: Rosa Anne MD;  Location: [facility] ENDOSCOPY;  Service: Gastroenterology;  Laterality: N/A;  pre screening  post polyp  pcp DARIO Nichole MD    DILATATION AND CURETTAGE      ENDOSCOPY N/A 12/04/2023    No endoscopic esophageal abnormality to explain patient's dysphagia. Esophagus dilated. Dilated. - There is no endoscopic evidence of Ingram's esophagus. - Normal stomach. - Normal examined duodenum. - No specimens collecte    GALLBLADDER SURGERY  10/01/2024    TUBAL ABDOMINAL LIGATION      WISDOM TOOTH EXTRACTION  01/01/2021       Family History   Problem Relation Age of Onset    Colon polyps Mother         < 60 years old    Arthritis Mother     Heart disease Mother     Stroke Father     No Known Problems Sister     No Known Problems Brother     No Known Problems Maternal Grandmother     No Known Problems Paternal Grandmother     No Known Problems Daughter     No Known Problems Son     No Known Problems Maternal Aunt     No Known Problems Paternal Aunt     BRCA 1/2 Neg Hx     Breast cancer Neg Hx     Colon cancer Neg Hx     Endometrial cancer Neg Hx     Ovarian cancer Neg Hx        Social History     Socioeconomic History    Marital status: Single   Tobacco Use    Smoking status: Never    Smokeless tobacco: Never   Vaping Use    Vaping status: Never Used   Substance and Sexual Activity    Alcohol use: Not Currently    Drug use: Never    Sexual activity: Yes     Partners: Male     Birth  "control/protection: Surgical       Prior to Admission medications    Medication Sig Start Date End Date Taking? Authorizing Provider   acetaminophen (Tylenol) 325 MG tablet Take 3 tablets by mouth Every 8 (Eight) Hours. Take every 8 hours for 3 days then take prn as needed. 10/1/24 10/1/25  Jolly Dia MD   Azelastine HCl 137 MCG/SPRAY solution SPRAY 1 TO 2 SPRAYS INTO EACH NOSTRIL TWICE A DAY AS NEEDED 4/8/24   Alan Ventura MD   diclofenac (VOLTAREN) 75 MG EC tablet Take 1 tablet by mouth 2 (Two) Times a Day As Needed (Moderate pain) for up to 30 days. 11/27/24 12/27/24  Chucho Massey Jr., MD   DULoxetine (CYMBALTA) 30 MG capsule Take 1 capsule by mouth Every Morning. 11/12/24   Alan Ventura MD   fluticasone (FLONASE) 50 MCG/ACT nasal spray 1 spray by Each Nare route Daily. 7/18/24   Lauren Wells APRN   gabapentin (NEURONTIN) 300 MG capsule Take 1 capsule by mouth 2 (Two) Times a Day. 6/19/24   DARIO Nichole MD   lamoTRIgine (LaMICtal) 25 MG tablet 2 TABLETS NIGHTLY 4/10/24   Alan Ventura MD   loratadine (Claritin) 10 MG tablet Take 1 tablet by mouth Daily.    Alan Ventura MD   Metoprolol Succinate 25 MG capsule extended-release 24 hour sprinkle Metoprolol Succinate    Alan Ventura MD   mometasone (ELOCON) 0.1 % solution  9/2/24   Alan Ventura MD   NON FORMULARY Pt states that there are 2 more meds she takes name unknown for acne and rashs    Alan Ventura MD   pimecrolimus (ELIDEL) 1 % cream APPLY TO FULL FACE EVERY NIGHT 2/15/24   Alan Ventura MD   tiZANidine (ZANAFLEX) 4 MG tablet Take 1 tablet by mouth 2 (Two) Times a Day. 12/19/24   Lauren Wells APRN       /81 (BP Location: Right arm, Patient Position: Sitting)   Pulse 70   Temp 98.3 °F (36.8 °C) (Oral)   Resp 16   Ht 170.2 cm (67\")   Wt 84.8 kg (187 lb)   LMP 11/20/2024 (Exact Date)   SpO2 98%   BMI 29.29 kg/m²     Objective "   Physical Exam  Vitals and nursing note reviewed.   Constitutional:       Appearance: She is well-developed.   HENT:      Head: Normocephalic and atraumatic.   Eyes:      Conjunctiva/sclera: Conjunctivae normal.      Pupils: Pupils are equal, round, and reactive to light.   Neck:      Thyroid: No thyromegaly.      Trachea: No tracheal deviation.   Cardiovascular:      Rate and Rhythm: Normal rate and regular rhythm.      Heart sounds: Normal heart sounds.   Pulmonary:      Effort: Pulmonary effort is normal. No respiratory distress.      Breath sounds: Normal breath sounds. No wheezing or rales.   Chest:      Chest wall: No tenderness.   Musculoskeletal:      Cervical back: Normal range of motion and neck supple.      Comments: Left hand: There is approximately a 1-1/2 cm laceration noted to the webspace between the thumb and the index finger.  Flexion extension is intact to all digits.  Peripheral pulses are palpable.  The wound is gaped open.   Skin:     General: Skin is warm and dry.   Neurological:      Mental Status: She is alert and oriented to person, place, and time.      Cranial Nerves: No cranial nerve deficit.      Deep Tendon Reflexes: Reflexes are normal and symmetric.   Psychiatric:         Behavior: Behavior normal.         Thought Content: Thought content normal.         Judgment: Judgment normal.         Laceration Repair    Date/Time: 12/25/2024 11:35 AM    Performed by: Alisha Greene APRN  Authorized by: Chucho Pereira Jr., MD    Consent:     Consent obtained:  Verbal and written    Consent given by:  Patient    Risks, benefits, and alternatives were discussed: yes      Risks discussed:  Infection, need for additional repair, nerve damage, pain, poor cosmetic result, poor wound healing, tendon damage, vascular damage and retained foreign body    Alternatives discussed:  No treatment, delayed treatment and observation  Universal protocol:     Procedure explained and questions answered  to patient or proxy's satisfaction: yes      Relevant documents present and verified: yes      Patient identity confirmed:  Verbally with patient, arm band and provided demographic data  Anesthesia:     Anesthesia method:  Local infiltration    Local anesthetic:  Lidocaine 1% w/o epi  Laceration details:     Location:  Hand    Hand location:  L palm    Length (cm):  1.5  Pre-procedure details:     Preparation:  Patient was prepped and draped in usual sterile fashion  Exploration:     Imaging outcome: foreign body not noted      Wound extent: no areolar tissue violation noted, no fascia violation noted, no foreign bodies/material noted, no muscle damage noted, no nerve damage noted, no tendon damage noted, no underlying fracture noted and no vascular damage noted      Contaminated: no    Treatment:     Area cleansed with:  Shur-Clens, saline and povidone-iodine    Amount of cleaning:  Extensive    Irrigation method:  Pressure wash and syringe    Visualized foreign bodies/material removed: no    Skin repair:     Repair method:  Sutures    Suture size:  5-0    Suture material:  Nylon    Suture technique:  Simple interrupted    Number of sutures:  3  Approximation:     Approximation:  Loose  Repair type:     Repair type:  Simple  Post-procedure details:     Dressing: bacitraicin/adaptic/sandra/ace.    Procedure completion:  Tolerated well, no immediate complications           Lab Results (last 24 hours)       ** No results found for the last 24 hours. **            No orders to display       ED Course  ED Course as of 12/25/24 1505   Wed Dec 25, 2024   1153 Laceration repaired.  Patient tolerated well.  Will place the patient prophylactically on antibiotics will prescribe small amount of pain medication for acute pain.  Reviewed side effects and potential for abuse.  Advised the patient to clean the wound twice daily with soap and water and apply dressing.  Return the emergency department in 10 days for suture removal  return before if she has signs of infection including redness, swelling, drainage from the wound.  Patient voices understanding of instructions.  Patient be discharged shortly in stable condition. [CW]      ED Course User Index  [CW] Alisha Greene APRN        Medical Decision Making  Patient is a 44-year-old female presents emergency department with dog bite to the left hand.  She states she was at her sister's home and her sisters dog bit her.  The dog is up-to-date on its immunizations.  The patient is right hand dominant.  There is no other injury.  There is a puncture wound to the palmar aspect of the left hand.  Patient is unsure of her last tetanus immunization.  Course of treatment in the er: non toxic appearing. No acute distress. LUE: there is approx 1.5cm laceration noted to web space between the thumb and index finger. Flexion and extension intact to all digits. Periph pulses palp. Will irrigate the wound and repair. Will update on tetanus as well   Laceration repaired.  Patient tolerated well.  Will place the patient prophylactically on antibiotics will prescribe small amount of pain medication for acute pain.  Reviewed side effects and potential for abuse.  Advised the patient to clean the wound twice daily with soap and water and apply dressing.  Return the emergency department in 10 days for suture removal return before if she has signs of infection including redness, swelling, drainage from the wound.  Patient voices understanding of instructions.  Patient be discharged shortly in stable condition    Problems Addressed:  Dog bite, initial encounter: complicated acute illness or injury  Laceration of left hand without foreign body, initial encounter: complicated acute illness or injury    Risk  Prescription drug management.         Final diagnoses:   Dog bite, initial encounter   Laceration of left hand without foreign body, initial encounter          Alisha Greene APRN  12/25/24  2025

## 2024-12-30 LAB
CV ZIO BASELINE AVG BPM: 74 BPM
CV ZIO BASELINE BPM HIGH: 145 BPM
CV ZIO BASELINE BPM LOW: 49 BPM
CV ZIO DEVICE ANALYSIS TIME: NORMAL
CV ZIO ECT SVE COUNT: 103 EPISODES
CV ZIO ECT SVE CPLT COUNT: 0 EPISODES
CV ZIO ECT SVE CPLT FREQ: 0
CV ZIO ECT SVE FREQ: NORMAL
CV ZIO ECT SVE TPLT COUNT: 0 EPISODES
CV ZIO ECT SVE TPLT FREQ: 0
CV ZIO ECT VE COUNT: 52 EPISODES
CV ZIO ECT VE CPLT COUNT: 0 EPISODES
CV ZIO ECT VE CPLT FREQ: 0
CV ZIO ECT VE FREQ: NORMAL
CV ZIO ECT VE TPLT COUNT: 0 EPISODES
CV ZIO ECT VE TPLT FREQ: 0
CV ZIO ECTOPIC SVE COUPLET RAW PERCENT: 0 %
CV ZIO ECTOPIC SVE ISOLATED PERCENT: 0.01 %
CV ZIO ECTOPIC SVE TRIPLET RAW PERCENT: 0 %
CV ZIO ECTOPIC VE COUPLET RAW PERCENT: 0 %
CV ZIO ECTOPIC VE ISOLATED PERCENT: 0.01 %
CV ZIO ECTOPIC VE TRIPLET RAW PERCENT: 0 %
CV ZIO ENROLLMENT END: NORMAL
CV ZIO ENROLLMENT START: NORMAL
CV ZIO PATIENT EVENTS DIARIES: 3
CV ZIO PATIENT EVENTS TRIGGERS: 2
CV ZIO PAUSE COUNT: 0
CV ZIO PRESCRIPTION STATUS: NORMAL
CV ZIO SVT COUNT: 0
CV ZIO TOTAL  ENROLLMENT PERIOD: NORMAL
CV ZIO VT COUNT: 0

## 2024-12-31 ENCOUNTER — TELEPHONE (OUTPATIENT)
Dept: EMERGENCY DEPT | Facility: HOSPITAL | Age: 44
End: 2024-12-31
Payer: COMMERCIAL

## 2025-01-01 DIAGNOSIS — M79.7 FIBROMYALGIA: ICD-10-CM

## 2025-01-02 RX ORDER — GABAPENTIN 300 MG/1
300 CAPSULE ORAL 2 TIMES DAILY
Qty: 60 CAPSULE | Refills: 0 | Status: SHIPPED | OUTPATIENT
Start: 2025-01-02

## 2025-01-02 NOTE — TELEPHONE ENCOUNTER
Rx Refill Note  Requested Prescriptions     Pending Prescriptions Disp Refills    gabapentin (NEURONTIN) 300 MG capsule [Pharmacy Med Name: GABAPENTIN 300 MG CAPSULE] 60 capsule      Sig: TAKE 1 CAPSULE BY MOUTH TWICE A DAY      Last office visit with prescribing clinician: 9/10/2024   Last telemedicine visit with prescribing clinician: Visit date not found   Next office visit with prescribing clinician: 6/20/2025                         Would you like a call back once the refill request has been completed: [] Yes [] No    If the office needs to give you a call back, can they leave a voicemail: [] Yes [] No    Carlos Souza MA  01/02/25, 09:57 CST

## 2025-01-07 ENCOUNTER — TELEPHONE (OUTPATIENT)
Age: 45
End: 2025-01-07

## 2025-01-07 RX ORDER — METHYLPREDNISOLONE 4 MG/1
TABLET ORAL
Qty: 1 KIT | Refills: 0 | Status: SHIPPED | OUTPATIENT
Start: 2025-01-07

## 2025-01-07 NOTE — TELEPHONE ENCOUNTER
Per Dr. Dillard Okay for Prednisone pack to be sent to pharmacy for patient. I have pended the medication for Dr. Dillard to sign and send to patient's pharmacy.    Called and left a message to let patient to call back so we can let her know that this has been done.

## 2025-01-07 NOTE — TELEPHONE ENCOUNTER
Pt is calling to see what she may take for pain until her next appt. Please call back and advise pt on what to do

## 2025-01-07 NOTE — TELEPHONE ENCOUNTER
Called patient and she states that she is taking the Diclofenac and has been taking tylenol. She has been using heat to the area. I did suggest she use ice/cold packs to the area and not heat.  She states that she gets pain even with the smallest movements like reaching to grab a cup or pulling up her pants. It causes shooting sharp pain from the shoulder joint area down her arm. She thinks it has to do with her nerves more then muscle because of this. Patient is wanting to know what else she can do to help the pain until her follow up appointment on 01/15/2025 with MAIRA Flannery.

## 2025-01-08 ENCOUNTER — TELEPHONE (OUTPATIENT)
Age: 45
End: 2025-01-08

## 2025-01-08 NOTE — TELEPHONE ENCOUNTER
Suyapa Olguin     BP    1/8/25  7:57 AM  Note     Patient called said that she has nerve pain shooting down her arm, it is making it hard on her to do anything, even pull her pants up, wants to know if there is anything that you can do.please give her a call

## 2025-01-08 NOTE — TELEPHONE ENCOUNTER
Called and left message for patient letting her know that the steroid pack was sent to her pharmacy which should help with her symptoms.

## 2025-01-08 NOTE — TELEPHONE ENCOUNTER
Patient called said that she has nerve pain shooting down her arm, it is making it hard on her to do anything, even pull her pants up, wants to know if there is anything that you can do.please give her a call

## 2025-01-14 ENCOUNTER — OFFICE VISIT (OUTPATIENT)
Dept: CARDIOLOGY | Facility: CLINIC | Age: 45
End: 2025-01-14
Payer: COMMERCIAL

## 2025-01-14 VITALS
BODY MASS INDEX: 27.94 KG/M2 | SYSTOLIC BLOOD PRESSURE: 118 MMHG | OXYGEN SATURATION: 98 % | DIASTOLIC BLOOD PRESSURE: 82 MMHG | HEART RATE: 76 BPM | WEIGHT: 178 LBS | HEIGHT: 67 IN

## 2025-01-14 DIAGNOSIS — R53.83 FATIGUE, UNSPECIFIED TYPE: ICD-10-CM

## 2025-01-14 DIAGNOSIS — R00.0 TACHYCARDIA: Primary | ICD-10-CM

## 2025-01-14 DIAGNOSIS — R00.2 PALPITATIONS: ICD-10-CM

## 2025-01-14 PROCEDURE — 99204 OFFICE O/P NEW MOD 45 MIN: CPT | Performed by: INTERNAL MEDICINE

## 2025-01-14 PROCEDURE — 93000 ELECTROCARDIOGRAM COMPLETE: CPT | Performed by: INTERNAL MEDICINE

## 2025-01-14 NOTE — PROGRESS NOTES
Problem Relation Age of Onset    Diabetes Paternal Grandmother     Stroke Father     Osteoporosis Mother     Diabetes Maternal Grandmother     Osteoporosis Maternal Grandmother        Review of Systems  System  Neg/Pos  Details  Constitutional  Negative  Chills, Fatigue, Fever and Night Sweats  Respiratory  Negative  Chest Pain, Cough and Dyspnea  Cardio   Negative  Leg Swelling  GI   Negative  Abdominal Pain, Constipation, Nausea and Vomiting     Negative  Urinary Incontinence   Endocrine  Negative  Weight Gain and Weight Loss  MS   Negative  Except as noted in HPI and Chief Complaint    PHYSICAL EXAM:    Vitals:   Vitals:    01/15/25 0802   Weight: 80.3 kg (177 lb)   Height: 1.676 m (5' 6\")       General:  Appears stated age, no distress.  Orientation:  Alert and oriented to time, place, and person.  Mood and Affect:  Cooperative and pleasant.  Gait: Heel to gait  Cardiovascular:  Symmetric 1-2 plus pulses in upper and lower extremities.  Lymph:  No cervical or inguinal lymphadenopathy noted.  Sensation:  Grossly intact to light touch.  DTR:  Normal, no pathologic reflexes.  Coordination/balance:  Normal    Musculoskeletal:    Left Shoulder:    Tender to palpation globally that radiates along the rhomboid and trapezius with burning pain.  She is wearing a sling today.  She is able to elevate and abduct to 90 degrees.  Rotator cuff testing is difficult to assess given her level of pain.  Her joint is stable    Radiology: X-rays of the left shoulder performed on 11/27/2024 performed at [facility] and been reviewed.  I agree with the read report below.  There is mild AC joint arthritic change.  Narrative    EXAMINATION: XR SHOULDER 2+ VW LEFT-    11/27/2024 6:28 AM    HISTORY: Fall injury    3 images of the left shoulder are obtained. There is no previous study  for comparison.    There is no evidence of recent fracture or dislocation.    The glenohumeral and acromioclavicular articulations appear

## 2025-01-14 NOTE — LETTER
"January 14, 2025     DARIO Nichole MD  [address]  [address]  [facility] [address]    Patient: Zena Zheng   YOB: 1980   Date of Visit: 1/14/2025       Dear DARIO Nichole MD,    Thank you for referring Zena Zheng to me for evaluation. Below is a copy of my consult note.    If you have questions, please do not hesitate to call me. I look forward to following Zena along with you.         Sincerely,        Sha Madden MD        CC: No Recipients      Reason for Visit: Tachycardia.    HPI:  Zena Zheng is a 44 y.o. female is being seen for consultation today at the request of DARIO Nichole MD for evaluation of tachycardia.  She has had 3 normal-appearing Holter monitors over the last year and a half.  She had a low risk stress echo on 5/23/2023.  She had normal cardiac structure and function on echo on 12/20/2024.    When she stands up in the morning she feels \"weird\" and tired.  She feels sleepy and like her heart is struggling.  She feels like she is trying to wake up, but has difficulty.  It will feel like her heart is beating faster.  She started noticing this about 4 to 5 months ago.  She reports having some good days.  She feels tired at the end of a long shift and notices her hands shaking.      Previous Cardiac Testing and Procedures:  -Ultrasound venous Doppler lower extremity (8/12/2022) normal duplex ultrasound of the left lower extremity without evidence of DVT  -Holter monitor (5/1/2023) normal monitor study, average heart rate 93 bpm, symptoms correlated with sinus rhythm  -Stress echo (5/23/2023) clinically and electrically negative, normal wall motion at rest and stress, low risk  -Holter monitor (10/23/2024) sinus rhythm with average heart rate of 85 bpm, no significant arrhythmias, normal Holter monitor  -Holter monitor (12/18/2024) average heart rate 74 bpm, no significant arrhythmias, rare ectopic beats, all triggered events and " symptoms correlated with normal sinus rhythm, benign monitor study  -Echo (12/20/2024) EF 61 to 65%, normal diastolic function, normal RV size and function, no significant valve disease    Lab data:  -TSH (10/18/2024) 1.01  -Lipid panel (12/19/2024) total cholesterol 194, HDL 52, , triglycerides 91  -Hemoglobin A1c (12/19/2024) 5.3%  -BMP (12/19/2024) creatinine 0.71, potassium 4, sodium 142    Patient Active Problem List   Diagnosis   • Epigastric pain   • Anxiety   • Fibromyalgia   • Chronic left shoulder pain   • History of adenomatous polyp of colon   • Gallbladder sludge   • Gallbladder polyp       Social History     Tobacco Use   • Smoking status: Never   • Smokeless tobacco: Never   Vaping Use   • Vaping status: Never Used   Substance Use Topics   • Alcohol use: Not Currently   • Drug use: Never       Family History   Problem Relation Age of Onset   • Colon polyps Mother         < 60 years old   • Arthritis Mother    • Heart disease Mother    • Asthma Mother    • Hypertension Mother    • Stroke Father    • Clotting disorder Father    • Heart attack Father    • Hypertension Father    • Anemia Sister    • No Known Problems Brother    • No Known Problems Maternal Grandmother    • Arrhythmia Paternal Grandmother    • No Known Problems Daughter    • No Known Problems Son    • No Known Problems Maternal Aunt    • No Known Problems Paternal Aunt    • BRCA 1/2 Neg Hx    • Breast cancer Neg Hx    • Colon cancer Neg Hx    • Endometrial cancer Neg Hx    • Ovarian cancer Neg Hx        The following portions of the patient's history were reviewed and updated as appropriate: allergies, current medications, past family history, past medical history, past social history, past surgical history, and problem list.      Current Outpatient Medications:   •  acetaminophen (Tylenol) 325 MG tablet, Take 3 tablets by mouth Every 8 (Eight) Hours. Take every 8 hours for 3 days then take prn as needed., Disp: , Rfl:   •   "Azelastine HCl 137 MCG/SPRAY solution, SPRAY 1 TO 2 SPRAYS INTO EACH NOSTRIL TWICE A DAY AS NEEDED, Disp: , Rfl:   •  DULoxetine (CYMBALTA) 30 MG capsule, Take 1 capsule by mouth Every Morning., Disp: , Rfl:   •  fluticasone (FLONASE) 50 MCG/ACT nasal spray, 1 spray by Each Nare route Daily., Disp: 15.8 mL, Rfl: 11  •  gabapentin (NEURONTIN) 300 MG capsule, Take 1 capsule by mouth 2 (Two) Times a Day., Disp: 60 capsule, Rfl: 0  •  lamoTRIgine (LaMICtal) 25 MG tablet, 2 TABLETS NIGHTLY, Disp: , Rfl:   •  loratadine (Claritin) 10 MG tablet, Take 1 tablet by mouth Daily., Disp: , Rfl:   •  Metoprolol Succinate 25 MG capsule extended-release 24 hour sprinkle, Metoprolol Succinate, Disp: , Rfl:   •  mometasone (ELOCON) 0.1 % solution, , Disp: , Rfl:   •  tiZANidine (ZANAFLEX) 4 MG tablet, Take 1 tablet by mouth 2 (Two) Times a Day., Disp: 60 tablet, Rfl: 3    Review of Systems   Constitutional: Positive for malaise/fatigue. Negative for chills and fever.   Cardiovascular:  Positive for palpitations. Negative for chest pain and paroxysmal nocturnal dyspnea.   Respiratory:  Negative for cough and shortness of breath.    Skin:  Negative for rash.   Gastrointestinal:  Negative for abdominal pain and heartburn.   Neurological:  Negative for dizziness and numbness.       Objective  /82 (BP Location: Left arm, Patient Position: Sitting, Cuff Size: Adult)   Pulse 76   Ht 170.2 cm (67.01\")   Wt 80.7 kg (178 lb)   SpO2 98%   BMI 27.87 kg/m²   Constitutional:       Appearance: Well-developed.   HENT:      Head: Normocephalic and atraumatic.   Pulmonary:      Effort: Pulmonary effort is normal.      Breath sounds: Normal breath sounds.   Cardiovascular:      Normal rate. Regular rhythm.   Edema:     Peripheral edema absent.   Skin:     General: Skin is warm and dry.   Neurological:      Mental Status: Alert and oriented to person, place, and time.         ECG 12 Lead    Date/Time: 1/14/2025 1:21 PM  Performed by: Maryanne" Sha Ervin MD    Authorized by: Sha Madden MD  Comparison: compared with previous ECG from 10/18/2024  Similar to previous ECG  Rhythm: sinus rhythm  Rate: normal          No diagnosis found.      Assessment/Plan:  1.

## 2025-01-14 NOTE — PROGRESS NOTES
"  Reason for Visit: Tachycardia.    HPI:  Zena Zheng is a 44 y.o. female is being seen for consultation today at the request of DARIO Nichole MD for evaluation of tachycardia.  She has had 3 normal-appearing Holter monitors over the last year and a half.  She had a low risk stress echo on 5/23/2023.  She had normal cardiac structure and function on echo on 12/20/2024.    When she stands up in the morning she feels \"weird\" and tired.  She feels sleepy and like her heart is struggling.  She feels like she is trying to wake up, but has difficulty.  It will feel like her heart is beating faster.  She started noticing this about 4 to 5 months ago.  She reports having some good days.  She feels tired at the end of a long shift and notices her hands shaking.      Previous Cardiac Testing and Procedures:  -Ultrasound venous Doppler lower extremity (8/12/2022) normal duplex ultrasound of the left lower extremity without evidence of DVT  -Holter monitor (5/1/2023) normal monitor study, average heart rate 93 bpm, symptoms correlated with sinus rhythm  -Stress echo (5/23/2023) clinically and electrically negative, normal wall motion at rest and stress, low risk  -Holter monitor (10/23/2024) sinus rhythm with average heart rate of 85 bpm, no significant arrhythmias, normal Holter monitor  -Holter monitor (12/18/2024) average heart rate 74 bpm, no significant arrhythmias, rare ectopic beats, all triggered events and symptoms correlated with normal sinus rhythm, benign monitor study  -Echo (12/20/2024) EF 61 to 65%, normal diastolic function, normal RV size and function, no significant valve disease    Lab data:  -TSH (10/18/2024) 1.01  -Lipid panel (12/19/2024) total cholesterol 194, HDL 52, , triglycerides 91  -Hemoglobin A1c (12/19/2024) 5.3%  -BMP (12/19/2024) creatinine 0.71, potassium 4, sodium 142    Patient Active Problem List   Diagnosis    Epigastric pain    Anxiety    Fibromyalgia    Chronic left " shoulder pain    History of adenomatous polyp of colon    Gallbladder sludge    Gallbladder polyp       Social History     Tobacco Use    Smoking status: Never    Smokeless tobacco: Never   Vaping Use    Vaping status: Never Used   Substance Use Topics    Alcohol use: Not Currently    Drug use: Never       Family History   Problem Relation Age of Onset    Colon polyps Mother         < 60 years old    Arthritis Mother     Heart disease Mother     Asthma Mother     Hypertension Mother     Stroke Father     Clotting disorder Father     Heart attack Father     Hypertension Father     Anemia Sister     No Known Problems Brother     No Known Problems Maternal Grandmother     Arrhythmia Paternal Grandmother     No Known Problems Daughter     No Known Problems Son     No Known Problems Maternal Aunt     No Known Problems Paternal Aunt     BRCA 1/2 Neg Hx     Breast cancer Neg Hx     Colon cancer Neg Hx     Endometrial cancer Neg Hx     Ovarian cancer Neg Hx        The following portions of the patient's history were reviewed and updated as appropriate: allergies, current medications, past family history, past medical history, past social history, past surgical history, and problem list.      Current Outpatient Medications:     acetaminophen (Tylenol) 325 MG tablet, Take 3 tablets by mouth Every 8 (Eight) Hours. Take every 8 hours for 3 days then take prn as needed., Disp: , Rfl:     Azelastine HCl 137 MCG/SPRAY solution, SPRAY 1 TO 2 SPRAYS INTO EACH NOSTRIL TWICE A DAY AS NEEDED, Disp: , Rfl:     DULoxetine (CYMBALTA) 30 MG capsule, Take 1 capsule by mouth Every Morning., Disp: , Rfl:     fluticasone (FLONASE) 50 MCG/ACT nasal spray, 1 spray by Each Nare route Daily., Disp: 15.8 mL, Rfl: 11    gabapentin (NEURONTIN) 300 MG capsule, Take 1 capsule by mouth 2 (Two) Times a Day., Disp: 60 capsule, Rfl: 0    lamoTRIgine (LaMICtal) 25 MG tablet, 2 TABLETS NIGHTLY, Disp: , Rfl:     loratadine (Claritin) 10 MG tablet, Take 1  "tablet by mouth Daily., Disp: , Rfl:     Metoprolol Succinate 25 MG capsule extended-release 24 hour sprinkle, Metoprolol Succinate, Disp: , Rfl:     mometasone (ELOCON) 0.1 % solution, , Disp: , Rfl:     tiZANidine (ZANAFLEX) 4 MG tablet, Take 1 tablet by mouth 2 (Two) Times a Day., Disp: 60 tablet, Rfl: 3    Review of Systems   Constitutional: Positive for malaise/fatigue. Negative for chills and fever.   Cardiovascular:  Positive for chest pain and palpitations. Negative for paroxysmal nocturnal dyspnea.   Respiratory:  Positive for shortness of breath. Negative for cough.    Skin:  Negative for rash.   Musculoskeletal:  Positive for muscle weakness.   Gastrointestinal:  Negative for abdominal pain and heartburn.   Neurological:  Positive for dizziness, tremors and weakness. Negative for numbness.       Objective   /82 (BP Location: Left arm, Patient Position: Sitting, Cuff Size: Adult)   Pulse 76   Ht 170.2 cm (67.01\")   Wt 80.7 kg (178 lb)   SpO2 98%   BMI 27.87 kg/m²   Constitutional:       Appearance: Well-developed.   HENT:      Head: Normocephalic and atraumatic.   Pulmonary:      Effort: Pulmonary effort is normal.      Breath sounds: Normal breath sounds.   Cardiovascular:      Normal rate. Regular rhythm.   Edema:     Peripheral edema absent.   Skin:     General: Skin is warm and dry.   Neurological:      Mental Status: Alert and oriented to person, place, and time.         ECG 12 Lead    Date/Time: 1/14/2025 1:21 PM  Performed by: Sha Madden MD    Authorized by: Sha Madden MD  Comparison: compared with previous ECG from 10/18/2024  Similar to previous ECG  Rhythm: sinus rhythm  Rate: normal            ICD-10-CM ICD-9-CM   1. Tachycardia  R00.0 785.0   2. Palpitations  R00.2 785.1   3. Fatigue, unspecified type  R53.83 780.79         Assessment/Plan:  1.  Tachycardia: Intermittent symptoms of tachycardia and palpitations.  She has had 3 normal-appearing Holter monitors dating back " to 5/1/2023.  Most recently on 12/18/2024.  Holter monitor images were reviewed and triggered events and symptoms correlated with sinus rhythm.  There is no evidence of any cardiac abnormalities that would contribute to her symptoms.  It is reasonable to continue metoprolol at this helps symptoms, but there is otherwise no cardiac indication for the medication.    2.  Fatigue: Vague symptoms with a wide differential.  She recently had 3 normal Holter monitors as noted above, a low risk stress echo on 5/23/2023 and a normal echo on 12/20/2024.  There is no evidence of any cardiac abnormalities to explain her symptoms.    Patient can follow-up in cardiology clinic on an as-needed basis.

## 2025-01-15 ENCOUNTER — OFFICE VISIT (OUTPATIENT)
Age: 45
End: 2025-01-15

## 2025-01-15 VITALS — BODY MASS INDEX: 28.45 KG/M2 | WEIGHT: 177 LBS | HEIGHT: 66 IN

## 2025-01-15 DIAGNOSIS — S46.912A LEFT SHOULDER STRAIN, INITIAL ENCOUNTER: Primary | ICD-10-CM

## 2025-01-15 RX ORDER — BUPIVACAINE HYDROCHLORIDE 2.5 MG/ML
2 INJECTION, SOLUTION INFILTRATION; PERINEURAL ONCE
Status: COMPLETED | OUTPATIENT
Start: 2025-01-15 | End: 2025-01-15

## 2025-01-15 RX ORDER — TRIAMCINOLONE ACETONIDE 40 MG/ML
40 INJECTION, SUSPENSION INTRA-ARTICULAR; INTRAMUSCULAR ONCE
Status: COMPLETED | OUTPATIENT
Start: 2025-01-15 | End: 2025-01-15

## 2025-01-15 RX ORDER — LIDOCAINE HYDROCHLORIDE 10 MG/ML
2 INJECTION, SOLUTION INFILTRATION; PERINEURAL ONCE
Status: COMPLETED | OUTPATIENT
Start: 2025-01-15 | End: 2025-01-15

## 2025-01-15 RX ADMIN — TRIAMCINOLONE ACETONIDE 40 MG: 40 INJECTION, SUSPENSION INTRA-ARTICULAR; INTRAMUSCULAR at 08:47

## 2025-01-15 RX ADMIN — LIDOCAINE HYDROCHLORIDE 2 ML: 10 INJECTION, SOLUTION INFILTRATION; PERINEURAL at 08:46

## 2025-01-15 RX ADMIN — BUPIVACAINE HYDROCHLORIDE 5 MG: 2.5 INJECTION, SOLUTION INFILTRATION; PERINEURAL at 08:46

## 2025-01-21 ENCOUNTER — TELEPHONE (OUTPATIENT)
Age: 45
End: 2025-01-21

## 2025-01-21 NOTE — TELEPHONE ENCOUNTER
Pt has questions about the inj she had about 7 days ago. She states it was working and now its not. Please call pt with advice.

## 2025-01-23 NOTE — TELEPHONE ENCOUNTER
Tried to call patient to let her know what Luis suggested, call was sent to [employer]. Will try again later.

## 2025-01-28 NOTE — TELEPHONE ENCOUNTER
Pt states she is still having some soreness around the muscle and wanted to know if that it was normal; I let her know that it is normal but if she would like to come in to get it looked at we could get her scheduled, pt said she will hold off on an appointment for now and will call back if needed.

## 2025-02-02 DIAGNOSIS — M79.7 FIBROMYALGIA: ICD-10-CM

## 2025-02-03 RX ORDER — GABAPENTIN 300 MG/1
300 CAPSULE ORAL 2 TIMES DAILY
Qty: 60 CAPSULE | Refills: 4 | Status: SHIPPED | OUTPATIENT
Start: 2025-02-03

## 2025-02-03 NOTE — TELEPHONE ENCOUNTER
Rx Refill Note  Requested Prescriptions     Pending Prescriptions Disp Refills    gabapentin (NEURONTIN) 300 MG capsule 60 capsule 0     Sig: Take 1 capsule by mouth 2 (Two) Times a Day.      Last office visit with prescribing clinician: 9/10/2024   Last telemedicine visit with prescribing clinician: Visit date not found   Next office visit with prescribing clinician: 6/20/2025                         Would you like a call back once the refill request has been completed: [] Yes [] No    If the office needs to give you a call back, can they leave a voicemail: [] Yes [] No    BALAJI Kay  02/03/25, 09:41 CST   No

## 2025-02-05 ENCOUNTER — TELEPHONE (OUTPATIENT)
Dept: GASTROENTEROLOGY | Facility: CLINIC | Age: 45
End: 2025-02-05
Payer: COMMERCIAL

## 2025-02-12 ENCOUNTER — OFFICE VISIT (OUTPATIENT)
Dept: GASTROENTEROLOGY | Facility: CLINIC | Age: 45
End: 2025-02-12
Payer: MEDICAID

## 2025-02-12 VITALS
HEART RATE: 68 BPM | DIASTOLIC BLOOD PRESSURE: 80 MMHG | HEIGHT: 67 IN | OXYGEN SATURATION: 99 % | WEIGHT: 182 LBS | BODY MASS INDEX: 28.56 KG/M2 | SYSTOLIC BLOOD PRESSURE: 122 MMHG | TEMPERATURE: 97.1 F

## 2025-02-12 DIAGNOSIS — K76.9 LIVER LESION: ICD-10-CM

## 2025-02-12 DIAGNOSIS — K59.01 SLOW TRANSIT CONSTIPATION: Primary | ICD-10-CM

## 2025-02-12 DIAGNOSIS — Z78.9 NONSMOKER: ICD-10-CM

## 2025-02-12 PROCEDURE — 1160F RVW MEDS BY RX/DR IN RCRD: CPT | Performed by: CLINICAL NURSE SPECIALIST

## 2025-02-12 PROCEDURE — 1159F MED LIST DOCD IN RCRD: CPT | Performed by: CLINICAL NURSE SPECIALIST

## 2025-02-12 PROCEDURE — 99213 OFFICE O/P EST LOW 20 MIN: CPT | Performed by: CLINICAL NURSE SPECIALIST

## 2025-02-12 RX ORDER — DICLOFENAC SODIUM 75 MG/1
1 TABLET, DELAYED RELEASE ORAL EVERY 12 HOURS SCHEDULED
COMMUNITY
Start: 2025-01-22

## 2025-02-12 NOTE — PROGRESS NOTES
Zena Zheng  1980 2/12/2025  Chief Complaint   Patient presents with    GI Problem     6 month liver fu--constipated and painful gas-after gallbladder removed     Subjective   HPI  Zena Zheng is a 44 y.o. female who presents with a complaint of constipation. She says she is taking over the counter regimen and this was not working as well for her. She has taken miralax as well with no relief. She has some lower abdominal bloating and gas associated and cramping. She will go up to a week without a good BM. No bleeding. Her last colonoscopy was in September she had polyp removed.      Past Medical History:   Diagnosis Date    Abnormal ECG 8-25-24    Slow heart    Anxiety     Asthma 2021    Trouble catching breath    BV (bacterial vaginosis)     Depression     Fibromyalgia     Fibromyalgia, primary 07/2019    Gallbladder sludge 09/04/2024    Headache     Hypertension     Neuropathy     Scoliosis 09/2024    Tachycardia      Past Surgical History:   Procedure Laterality Date    ANKLE SURGERY      CHOLECYSTECTOMY N/A 10/01/2024    Procedure: LAPAROSCOPIC ROBOTIC ASSISTED CHOLECYSTECTOMY WTIH PRE-OPERATIVE INDOCYANINE GREEN INJECTION;  Surgeon: Jolly Dia MD;  Location: [facility];  Service: Robotics - [facility];  Laterality: N/A;    COLONOSCOPY N/A 09/19/2024    Dr. Anne-One 7 mm adenomatous  polyp in the rectum    DILATATION AND CURETTAGE      ENDOSCOPY N/A 12/04/2023    No endoscopic esophageal abnormality to explain patient's dysphagia. Esophagus dilated. Dilated. - There is no endoscopic evidence of Ingram's esophagus. - Normal stomach. - Normal examined duodenum. - No specimens collecte    GALLBLADDER SURGERY  10/01/2024    TUBAL ABDOMINAL LIGATION      WISDOM TOOTH EXTRACTION  01/01/2021       Outpatient Medications Marked as Taking for the 2/12/25 encounter (Office Visit) with Alicia Rankin APRN   Medication Sig Dispense Refill    acetaminophen (Tylenol) 325 MG tablet Take 3  tablets by mouth Every 8 (Eight) Hours. Take every 8 hours for 3 days then take prn as needed.      Azelastine HCl 137 MCG/SPRAY solution SPRAY 1 TO 2 SPRAYS INTO EACH NOSTRIL TWICE A DAY AS NEEDED      diclofenac (VOLTAREN) 75 MG EC tablet Take 1 tablet by mouth Every 12 (Twelve) Hours.      DULoxetine (CYMBALTA) 30 MG capsule Take 1 capsule by mouth Every Morning.      fluticasone (FLONASE) 50 MCG/ACT nasal spray 1 spray by Each Nare route Daily. 15.8 mL 11    gabapentin (NEURONTIN) 300 MG capsule Take 1 capsule by mouth 2 (Two) Times a Day. 60 capsule 4    lamoTRIgine (LaMICtal) 25 MG tablet 2 TABLETS NIGHTLY      loratadine (Claritin) 10 MG tablet Take 1 tablet by mouth Daily.      Metoprolol Succinate 25 MG capsule extended-release 24 hour sprinkle Metoprolol Succinate      mometasone (ELOCON) 0.1 % solution       tiZANidine (ZANAFLEX) 4 MG tablet Take 1 tablet by mouth 2 (Two) Times a Day. 60 tablet 3     Allergies   Allergen Reactions    Adhesive Tape Rash    Amoxicillin Rash     Beta lactam allergy details  Antibiotic reaction: rash, hives  Age at reaction: adult  Dose to reaction time: (!) minutes  Reason for antibiotic: other  Epinephrine required for reaction?: no  Tolerated antibiotics: unknown        Clindamycin/Lincomycin Rash    Doxycycline Hives and Itching    Erythromycin Hives and Itching    Sulfa Antibiotics Hives and Itching    Tape Rash     Social History     Socioeconomic History    Marital status: Single   Tobacco Use    Smoking status: Never    Smokeless tobacco: Never   Vaping Use    Vaping status: Never Used   Substance and Sexual Activity    Alcohol use: Not Currently    Drug use: Never    Sexual activity: Yes     Partners: Male     Birth control/protection: Surgical     Family History   Problem Relation Age of Onset    Colon polyps Mother         < 60 years old    Arthritis Mother     Heart disease Mother     Asthma Mother     Hypertension Mother     Stroke Father     Clotting disorder  Father     Heart attack Father     Hypertension Father     Anemia Sister     No Known Problems Brother     No Known Problems Maternal Grandmother     Arrhythmia Paternal Grandmother     No Known Problems Daughter     No Known Problems Son     No Known Problems Maternal Aunt     No Known Problems Paternal Aunt     BRCA 1/2 Neg Hx     Breast cancer Neg Hx     Colon cancer Neg Hx     Endometrial cancer Neg Hx     Ovarian cancer Neg Hx      Health Maintenance   Topic Date Due    COVID-19 Vaccine (1 - 2024-25 season) Never done    BMI FOLLOWUP  09/17/2025    ANNUAL PHYSICAL  12/19/2025    MAMMOGRAM  06/28/2026    COLORECTAL CANCER SCREENING  09/19/2027    PAP SMEAR  09/23/2027    TDAP/TD VACCINES (4 - Td or Tdap) 12/25/2034    HEPATITIS C SCREENING  Completed    INFLUENZA VACCINE  Completed    Pneumococcal Vaccine 0-49  Aged Out     Review of Systems   Constitutional:  Negative for activity change, appetite change, chills, diaphoresis, fatigue, fever and unexpected weight change.   HENT:  Negative for ear pain, hearing loss, mouth sores, sore throat, trouble swallowing and voice change.    Eyes: Negative.    Respiratory:  Negative for cough, choking, shortness of breath and wheezing.    Cardiovascular:  Negative for chest pain and palpitations.   Gastrointestinal:  Positive for constipation. Negative for abdominal pain, blood in stool, diarrhea, nausea and vomiting.   Endocrine: Negative for cold intolerance and heat intolerance.   Genitourinary:  Negative for decreased urine volume, dysuria, frequency, hematuria and urgency.   Musculoskeletal:  Negative for back pain, gait problem and myalgias.   Skin:  Negative for color change, pallor and rash.   Allergic/Immunologic: Negative for food allergies and immunocompromised state.   Neurological:  Negative for dizziness, tremors, seizures, syncope, weakness, light-headedness, numbness and headaches.   Hematological:  Negative for adenopathy. Does not bruise/bleed easily.  "  Psychiatric/Behavioral:  Negative for agitation and confusion. The patient is not nervous/anxious.    All other systems reviewed and are negative.    Objective   Vitals:    02/12/25 1419   BP: 122/80   BP Location: Left arm   Pulse: 68   Temp: 97.1 °F (36.2 °C)   TempSrc: Temporal   SpO2: 99%   Weight: 82.6 kg (182 lb)   Height: 170.2 cm (67.01\")     Body mass index is 28.5 kg/m².  Physical Exam  Constitutional:       Appearance: She is well-developed.   HENT:      Head: Normocephalic and atraumatic.   Eyes:      Pupils: Pupils are equal, round, and reactive to light.   Neck:      Trachea: No tracheal deviation.   Cardiovascular:      Rate and Rhythm: Normal rate and regular rhythm.      Heart sounds: Normal heart sounds. No murmur heard.     No friction rub. No gallop.   Pulmonary:      Effort: Pulmonary effort is normal. No respiratory distress.      Breath sounds: Normal breath sounds. No wheezing or rales.   Chest:      Chest wall: No tenderness.   Abdominal:      General: Bowel sounds are normal. There is no distension.      Palpations: Abdomen is soft. Abdomen is not rigid.      Tenderness: There is no abdominal tenderness. There is no guarding or rebound.   Musculoskeletal:         General: No tenderness or deformity. Normal range of motion.      Cervical back: Normal range of motion and neck supple.   Skin:     General: Skin is warm and dry.      Coloration: Skin is not pale.      Findings: No rash.   Neurological:      Mental Status: She is alert and oriented to person, place, and time.      Deep Tendon Reflexes: Reflexes are normal and symmetric.   Psychiatric:         Behavior: Behavior normal.         Thought Content: Thought content normal.         Judgment: Judgment normal.       Assessment & Plan   Diagnoses and all orders for this visit:    1. Slow transit constipation (Primary)  -     linaclotide (Linzess) 145 MCG capsule capsule; Take 1 capsule by mouth Every Morning Before Breakfast. Take >30 " min before 1st meal  Dispense: 30 capsule; Refill: 10    2. Nonsmoker    3. Liver lesion  Comments:  cysts per CT scan    Increase water intake  Do not take with food  Call with any concerns.    Part of this note may be an electronic transcription/translation of spoken language to printed text using the Dragon Dictation System.  Body mass index is 28.5 kg/m².  Return if symptoms worsen or fail to improve.  There are no Patient Instructions on file for this visit.         All risks, benefits, alternatives, and indications of colonoscopy and/or Endoscopy procedure have been discussed with the patient. Risks to include perforation of the colon requiring possible surgery or colostomy, risk of bleeding from biopsies or removal of colon tissue, possibility of missing a colon polyp or cancer, or adverse drug reaction.  Benefits to include the diagnosis and management of disease of the colon and rectum. Alternatives to include barium enema, radiographic evaluation, lab testing or no intervention. Pt verbalizes understanding and agrees.     Alicia Rankin, APRN  2/12/2025  15:03 CST          If you smoke or use tobacco, 4 minutes reading provided  Steps to Quit Smoking  Smoking tobacco can be harmful to your health and can affect almost every organ in your body. Smoking puts you, and those around you, at risk for developing many serious chronic diseases. Quitting smoking is difficult, but it is one of the best things that you can do for your health. It is never too late to quit.  What are the benefits of quitting smoking?  When you quit smoking, you lower your risk of developing serious diseases and conditions, such as:  Lung cancer or lung disease, such as COPD.  Heart disease.  Stroke.  Heart attack.  Infertility.  Osteoporosis and bone fractures.  Additionally, symptoms such as coughing, wheezing, and shortness of breath may get better when you quit. You may also find that you get sick less often because your body is  stronger at fighting off colds and infections. If you are pregnant, quitting smoking can help to reduce your chances of having a baby of low birth weight.  How do I get ready to quit?  When you decide to quit smoking, create a plan to make sure that you are successful. Before you quit:  Pick a date to quit. Set a date within the next two weeks to give you time to prepare.  Write down the reasons why you are quitting. Keep this list in places where you will see it often, such as on your bathroom mirror or in your car or wallet.  Identify the people, places, things, and activities that make you want to smoke (triggers) and avoid them. Make sure to take these actions:  Throw away all cigarettes at home, at work, and in your car.  Throw away smoking accessories, such as ashtrays and lighters.  Clean your car and make sure to empty the ashtray.  Clean your home, including curtains and carpets.  Tell your family, friends, and coworkers that you are quitting. Support from your loved ones can make quitting easier.  Talk with your health care provider about your options for quitting smoking.  Find out what treatment options are covered by your health insurance.  What strategies can I use to quit smoking?  Talk with your healthcare provider about different strategies to quit smoking. Some strategies include:  Quitting smoking altogether instead of gradually lessening how much you smoke over a period of time. Research shows that quitting “cold turkey” is more successful than gradually quitting.  Attending in-person counseling to help you build problem-solving skills. You are more likely to have success in quitting if you attend several counseling sessions. Even short sessions of 10 minutes can be effective.  Finding resources and support systems that can help you to quit smoking and remain smoke-free after you quit. These resources are most helpful when you use them often. They can include:  Online chats with a  counselor.  Telephone quitlines.  Printed self-help materials.  Support groups or group counseling.  Text messaging programs.  Mobile phone applications.  Taking medicines to help you quit smoking. (If you are pregnant or breastfeeding, talk with your health care provider first.) Some medicines contain nicotine and some do not. Both types of medicines help with cravings, but the medicines that include nicotine help to relieve withdrawal symptoms. Your health care provider may recommend:  Nicotine patches, gum, or lozenges.  Nicotine inhalers or sprays.  Non-nicotine medicine that is taken by mouth.  Talk with your health care provider about combining strategies, such as taking medicines while you are also receiving in-person counseling. Using these two strategies together makes you more likely to succeed in quitting than if you used either strategy on its own.  If you are pregnant or breastfeeding, talk with your health care provider about finding counseling or other support strategies to quit smoking. Do not take medicine to help you quit smoking unless told to do so by your health care provider.  What things can I do to make it easier to quit?  Quitting smoking might feel overwhelming at first, but there is a lot that you can do to make it easier. Take these important actions:  Reach out to your family and friends and ask that they support and encourage you during this time. Call telephone quitlines, reach out to support groups, or work with a counselor for support.  Ask people who smoke to avoid smoking around you.  Avoid places that trigger you to smoke, such as bars, parties, or smoke-break areas at work.  Spend time around people who do not smoke.  Lessen stress in your life, because stress can be a smoking trigger for some people. To lessen stress, try:  Exercising regularly.  Deep-breathing exercises.  Yoga.  Meditating.  Performing a body scan. This involves closing your eyes, scanning your body from head  to toe, and noticing which parts of your body are particularly tense. Purposefully relax the muscles in those areas.  Download or purchase mobile phone or tablet apps (applications) that can help you stick to your quit plan by providing reminders, tips, and encouragement. There are many free apps, such as QuitGuide from the CDC (Centers for Disease Control and Prevention). You can find other support for quitting smoking (smoking cessation) through smokefree.gov and other websites.  How will I feel when I quit smoking?  Within the first 24 hours of quitting smoking, you may start to feel some withdrawal symptoms. These symptoms are usually most noticeable 2-3 days after quitting, but they usually do not last beyond 2-3 weeks. Changes or symptoms that you might experience include:  Mood swings.  Restlessness, anxiety, or irritation.  Difficulty concentrating.  Dizziness.  Strong cravings for sugary foods in addition to nicotine.  Mild weight gain.  Constipation.  Nausea.  Coughing or a sore throat.  Changes in how your medicines work in your body.  A depressed mood.  Difficulty sleeping (insomnia).  After the first 2-3 weeks of quitting, you may start to notice more positive results, such as:  Improved sense of smell and taste.  Decreased coughing and sore throat.  Slower heart rate.  Lower blood pressure.  Clearer skin.  The ability to breathe more easily.  Fewer sick days.  Quitting smoking is very challenging for most people. Do not get discouraged if you are not successful the first time. Some people need to make many attempts to quit before they achieve long-term success. Do your best to stick to your quit plan, and talk with your health care provider if you have any questions or concerns.  This information is not intended to replace advice given to you by your health care provider. Make sure you discuss any questions you have with your health care provider.  Document Released: 12/12/2002 Document Revised:  08/15/2017 Document Reviewed: 05/03/2016  [employer]vier Interactive Patient Education © 2017 [employer]

## 2025-02-26 ENCOUNTER — OFFICE VISIT (OUTPATIENT)
Age: 45
End: 2025-02-26
Payer: COMMERCIAL

## 2025-02-26 VITALS — WEIGHT: 177 LBS | BODY MASS INDEX: 28.45 KG/M2 | HEIGHT: 66 IN

## 2025-02-26 DIAGNOSIS — S46.912A LEFT SHOULDER STRAIN, INITIAL ENCOUNTER: Primary | ICD-10-CM

## 2025-02-26 PROCEDURE — 99213 OFFICE O/P EST LOW 20 MIN: CPT | Performed by: PHYSICIAN ASSISTANT

## 2025-02-26 RX ORDER — DICLOFENAC SODIUM 75 MG/1
75 TABLET, DELAYED RELEASE ORAL 2 TIMES DAILY
Qty: 60 TABLET | Refills: 3 | Status: SHIPPED | OUTPATIENT
Start: 2025-02-26

## 2025-02-26 NOTE — PROGRESS NOTES
Orthopaedic Clinic Note-Established Patient     NAME:  Roxann Colindres   : 1980  MRN: [id number]      2025     CHIEF COMPLAINT:  Left shoulder pain      HISTORY OF PRESENT ILLNESS:   Roxann is a 44 y.o. female who presents to the office for evaluation and treatment of the left shoulder. She states that she initially injured the left shoulder approximately a year ago while exercising. She was followed by her primary care who initially recommended physical therapy.  However she did not follow through with this and did not perform an at home program.  She is complaining of burning pain in her arm since that time.  Her most recent injury occurred on 2024 when she fell through a porch.  She states that she landed on her feet but her left arm caught on the porch in an abducted position. She proceeded to go to the ED where they performed imaging and placed her in a sling with a prescription for Diclofenac. She states that she has continued to get better, pain has decreased slightly. However, she does still experience pain globally with ROM.  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  She does not feel as though she can intervene if there was a confrontation.    I most recently saw the patient in on 1/15/2025.  After discussing options, she was given a corticosteroid injection in the symptomatic joint.  She ha has not started doing home exercises yet.  She is here today for reevaluation.  She does note improvement of her pain.  She is also out of the diclofenac and would like a refill.      Past Medical History:        Diagnosis Date    Abnormal Pap smear of cervix     Allergic     Anemia     with pregnancy    ASCUS favor benign 10/2015    Depression     Fibromyalgia     Recurrent miscarriages        Past Surgical History:        Procedure Laterality Date    ABDOMEN SURGERY  10-1-24    ANKLE SURGERY Left     aprox in     DENTAL SURGERY

## 2025-03-19 ENCOUNTER — TELEPHONE (OUTPATIENT)
Dept: URGENT CARE | Facility: CLINIC | Age: 45
End: 2025-03-19
Payer: MEDICAID

## 2025-03-19 ENCOUNTER — APPOINTMENT (OUTPATIENT)
Dept: GENERAL RADIOLOGY | Facility: HOSPITAL | Age: 45
End: 2025-03-19
Payer: MEDICAID

## 2025-03-19 ENCOUNTER — APPOINTMENT (OUTPATIENT)
Dept: CT IMAGING | Facility: HOSPITAL | Age: 45
End: 2025-03-19
Payer: MEDICAID

## 2025-03-19 ENCOUNTER — HOSPITAL ENCOUNTER (EMERGENCY)
Facility: HOSPITAL | Age: 45
Discharge: HOME | End: 2025-03-19
Attending: EMERGENCY MEDICINE | Admitting: EMERGENCY MEDICINE
Payer: MEDICAID

## 2025-03-19 VITALS
OXYGEN SATURATION: 97 % | TEMPERATURE: 98 F | HEART RATE: 51 BPM | BODY MASS INDEX: 28.12 KG/M2 | HEIGHT: 66 IN | SYSTOLIC BLOOD PRESSURE: 123 MMHG | WEIGHT: 175 LBS | DIASTOLIC BLOOD PRESSURE: 77 MMHG | RESPIRATION RATE: 16 BRPM

## 2025-03-19 DIAGNOSIS — N39.0 ACUTE UTI: ICD-10-CM

## 2025-03-19 DIAGNOSIS — H53.9 VISUAL AURA: ICD-10-CM

## 2025-03-19 DIAGNOSIS — R42 VERTIGO: Primary | ICD-10-CM

## 2025-03-19 LAB
ALBUMIN SERPL-MCNC: 4.1 G/DL (ref 3.5–5.2)
ALBUMIN/GLOB SERPL: 1.5 G/DL
ALP SERPL-CCNC: 56 U/L (ref 39–117)
ALT SERPL W P-5'-P-CCNC: 9 U/L (ref 1–33)
ANION GAP SERPL CALCULATED.3IONS-SCNC: 12 MMOL/L (ref 5–15)
AST SERPL-CCNC: 12 U/L (ref 1–32)
B-HCG UR QL: NEGATIVE
BACTERIA UR QL AUTO: ABNORMAL /HPF
BASOPHILS # BLD AUTO: 0.05 10*3/MM3 (ref 0–0.2)
BASOPHILS NFR BLD AUTO: 0.5 % (ref 0–1.5)
BILIRUB SERPL-MCNC: 0.4 MG/DL (ref 0–1.2)
BILIRUB UR QL STRIP: NEGATIVE
BUN SERPL-MCNC: 8 MG/DL (ref 6–20)
BUN/CREAT SERPL: 9.8 (ref 7–25)
CALCIUM SPEC-SCNC: 9.2 MG/DL (ref 8.6–10.5)
CHLORIDE SERPL-SCNC: 103 MMOL/L (ref 98–107)
CLARITY UR: CLEAR
CO2 SERPL-SCNC: 22 MMOL/L (ref 22–29)
COLOR UR: YELLOW
CREAT SERPL-MCNC: 0.82 MG/DL (ref 0.57–1)
DEPRECATED RDW RBC AUTO: 44.5 FL (ref 37–54)
EGFRCR SERPLBLD CKD-EPI 2021: 89.5 ML/MIN/1.73
EOSINOPHIL # BLD AUTO: 0.09 10*3/MM3 (ref 0–0.4)
EOSINOPHIL NFR BLD AUTO: 0.9 % (ref 0.3–6.2)
ERYTHROCYTE [DISTWIDTH] IN BLOOD BY AUTOMATED COUNT: 13.1 % (ref 12.3–15.4)
EXPIRATION DATE: NORMAL
FLUAV RNA RESP QL NAA+PROBE: NOT DETECTED
FLUBV RNA RESP QL NAA+PROBE: NOT DETECTED
GLOBULIN UR ELPH-MCNC: 2.7 GM/DL
GLUCOSE SERPL-MCNC: 81 MG/DL (ref 65–99)
GLUCOSE UR STRIP-MCNC: NEGATIVE MG/DL
HCT VFR BLD AUTO: 39 % (ref 34–46.6)
HGB BLD-MCNC: 12.7 G/DL (ref 12–15.9)
HGB UR QL STRIP.AUTO: ABNORMAL
HYALINE CASTS UR QL AUTO: ABNORMAL /LPF
IMM GRANULOCYTES # BLD AUTO: 0.04 10*3/MM3 (ref 0–0.05)
IMM GRANULOCYTES NFR BLD AUTO: 0.4 % (ref 0–0.5)
INTERNAL NEGATIVE CONTROL: NEGATIVE
INTERNAL POSITIVE CONTROL: POSITIVE
KETONES UR QL STRIP: NEGATIVE
LEUKOCYTE ESTERASE UR QL STRIP.AUTO: ABNORMAL
LYMPHOCYTES # BLD AUTO: 2.96 10*3/MM3 (ref 0.7–3.1)
LYMPHOCYTES NFR BLD AUTO: 28.6 % (ref 19.6–45.3)
Lab: NORMAL
MCH RBC QN AUTO: 30.3 PG (ref 26.6–33)
MCHC RBC AUTO-ENTMCNC: 32.6 G/DL (ref 31.5–35.7)
MCV RBC AUTO: 93.1 FL (ref 79–97)
MONOCYTES # BLD AUTO: 0.73 10*3/MM3 (ref 0.1–0.9)
MONOCYTES NFR BLD AUTO: 7.1 % (ref 5–12)
NEUTROPHILS NFR BLD AUTO: 6.47 10*3/MM3 (ref 1.7–7)
NEUTROPHILS NFR BLD AUTO: 62.5 % (ref 42.7–76)
NITRITE UR QL STRIP: NEGATIVE
NRBC BLD AUTO-RTO: 0 /100 WBC (ref 0–0.2)
PH UR STRIP.AUTO: 6 [PH] (ref 5–8)
PLATELET # BLD AUTO: 360 10*3/MM3 (ref 140–450)
PMV BLD AUTO: 10.5 FL (ref 6–12)
POTASSIUM SERPL-SCNC: 3.6 MMOL/L (ref 3.5–5.2)
PROT SERPL-MCNC: 6.8 G/DL (ref 6–8.5)
PROT UR QL STRIP: NEGATIVE
RBC # BLD AUTO: 4.19 10*6/MM3 (ref 3.77–5.28)
RBC # UR STRIP: ABNORMAL /HPF
REF LAB TEST METHOD: ABNORMAL
SARS-COV-2 RNA RESP QL NAA+PROBE: NOT DETECTED
SODIUM SERPL-SCNC: 137 MMOL/L (ref 136–145)
SP GR UR STRIP: <=1.005 (ref 1–1.03)
SQUAMOUS #/AREA URNS HPF: ABNORMAL /HPF
UROBILINOGEN UR QL STRIP: ABNORMAL
WBC # UR STRIP: ABNORMAL /HPF
WBC NRBC COR # BLD AUTO: 10.34 10*3/MM3 (ref 3.4–10.8)

## 2025-03-19 PROCEDURE — 85025 COMPLETE CBC W/AUTO DIFF WBC: CPT | Performed by: EMERGENCY MEDICINE

## 2025-03-19 PROCEDURE — 71045 X-RAY EXAM CHEST 1 VIEW: CPT

## 2025-03-19 PROCEDURE — 87186 SC STD MICRODIL/AGAR DIL: CPT | Performed by: EMERGENCY MEDICINE

## 2025-03-19 PROCEDURE — 70450 CT HEAD/BRAIN W/O DYE: CPT

## 2025-03-19 PROCEDURE — 81025 URINE PREGNANCY TEST: CPT | Performed by: EMERGENCY MEDICINE

## 2025-03-19 PROCEDURE — 87086 URINE CULTURE/COLONY COUNT: CPT | Performed by: EMERGENCY MEDICINE

## 2025-03-19 PROCEDURE — 99284 EMERGENCY DEPT VISIT MOD MDM: CPT

## 2025-03-19 PROCEDURE — 36415 COLL VENOUS BLD VENIPUNCTURE: CPT

## 2025-03-19 PROCEDURE — 87636 SARSCOV2 & INF A&B AMP PRB: CPT | Performed by: EMERGENCY MEDICINE

## 2025-03-19 PROCEDURE — 81001 URINALYSIS AUTO W/SCOPE: CPT | Performed by: EMERGENCY MEDICINE

## 2025-03-19 PROCEDURE — 80053 COMPREHEN METABOLIC PANEL: CPT | Performed by: EMERGENCY MEDICINE

## 2025-03-19 PROCEDURE — 87077 CULTURE AEROBIC IDENTIFY: CPT | Performed by: EMERGENCY MEDICINE

## 2025-03-19 PROCEDURE — 93005 ELECTROCARDIOGRAM TRACING: CPT | Performed by: EMERGENCY MEDICINE

## 2025-03-19 PROCEDURE — 25810000003 SODIUM CHLORIDE 0.9 % SOLUTION: Performed by: EMERGENCY MEDICINE

## 2025-03-19 PROCEDURE — 93010 ELECTROCARDIOGRAM REPORT: CPT | Performed by: INTERNAL MEDICINE

## 2025-03-19 RX ORDER — CIPROFLOXACIN 500 MG/1
500 TABLET, FILM COATED ORAL 2 TIMES DAILY
Qty: 14 TABLET | Refills: 0 | Status: SHIPPED | OUTPATIENT
Start: 2025-03-19

## 2025-03-19 RX ORDER — MECLIZINE HYDROCHLORIDE 25 MG/1
25 TABLET ORAL 3 TIMES DAILY PRN
Qty: 12 TABLET | Refills: 0 | Status: SHIPPED | OUTPATIENT
Start: 2025-03-19 | End: 2025-03-24

## 2025-03-19 RX ORDER — MECLIZINE HYDROCHLORIDE 25 MG/1
25 TABLET ORAL ONCE
Status: COMPLETED | OUTPATIENT
Start: 2025-03-19 | End: 2025-03-19

## 2025-03-19 RX ORDER — SODIUM CHLORIDE 0.9 % (FLUSH) 0.9 %
10 SYRINGE (ML) INJECTION AS NEEDED
Status: DISCONTINUED | OUTPATIENT
Start: 2025-03-19 | End: 2025-03-19 | Stop reason: HOSPADM

## 2025-03-19 RX ADMIN — SODIUM CHLORIDE 500 ML: 9 INJECTION, SOLUTION INTRAVENOUS at 07:43

## 2025-03-19 RX ADMIN — MECLIZINE HYDROCLORIDE 25 MG: 25 TABLET ORAL at 07:35

## 2025-03-19 NOTE — TELEPHONE ENCOUNTER
45-year-old female who presents to the clinic due to possible medication reaction.  Due to symptoms; it was determined patient would benefit from higher level of care.  Patient agrees to treatment plan.  Patient transferred to the emergency room.  No examination done.

## 2025-03-19 NOTE — ED PROVIDER NOTES
Subjective   History of Present Illness  Patient is a 45-year-old female with a history of hypertension who presents to the ER with dizziness.  Patient states she has been having dizziness off and on for the last year.  Patient states she will often experience the symptoms in the morning when she wakes up.  She usually has around 2 episodes per week.  Patient states she was experiencing quite a bit of dizziness at work yesterday and noticed a headache and some low back pain.  Patient states that she went to sleep last night and woke up and had what she calls a red aura.  Patient states it was a sun pattern with rings around it.  Patient states she went back to sleep and woke up and it happened again but her symptoms have resolved this morning.  Patient states this has happened in the past.  She has not had any imaging of her brain.  She has had a mild cough.  She denies any fever, chest pain, shortness of air, abdominal pain, nausea vomiting diarrhea, urinary changes, numbness or weakness.      Review of Systems   Constitutional: Negative.    Eyes: Negative.    Respiratory:  Positive for cough.    Cardiovascular: Negative.    Gastrointestinal: Negative.    Endocrine: Negative.    Genitourinary: Negative.    Musculoskeletal:  Positive for back pain.   Skin: Negative.    Allergic/Immunologic: Negative.    Neurological:  Positive for dizziness and headaches.   Hematological: Negative.    Psychiatric/Behavioral: Negative.     All other systems reviewed and are negative.      Past Medical History:   Diagnosis Date    Abnormal ECG 8-25-24    Slow heart    Anxiety     Asthma 2021    Trouble catching breath    BV (bacterial vaginosis)     Depression     Fibromyalgia     Fibromyalgia, primary 07/2019    Gallbladder sludge 09/04/2024    Headache     Hypertension     Neuropathy     Scoliosis 09/2024    Tachycardia        Allergies   Allergen Reactions    Adhesive Tape Rash    Amoxicillin Rash     Beta lactam allergy  details  Antibiotic reaction: rash, hives  Age at reaction: adult  Dose to reaction time: (!) minutes  Reason for antibiotic: other  Epinephrine required for reaction?: no  Tolerated antibiotics: unknown        Clindamycin/Lincomycin Rash    Doxycycline Hives and Itching    Erythromycin Hives and Itching    Sulfa Antibiotics Hives and Itching    Tape Rash       Past Surgical History:   Procedure Laterality Date    ANKLE SURGERY      CHOLECYSTECTOMY N/A 10/01/2024    Procedure: LAPAROSCOPIC ROBOTIC ASSISTED CHOLECYSTECTOMY WTIH PRE-OPERATIVE INDOCYANINE GREEN INJECTION;  Surgeon: Jolly Dia MD;  Location: [facility];  Service: Robotics - [facility];  Laterality: N/A;    COLONOSCOPY N/A 09/19/2024    Dr. Anne-One 7 mm adenomatous  polyp in the rectum    DILATATION AND CURETTAGE      ENDOSCOPY N/A 12/04/2023    No endoscopic esophageal abnormality to explain patient's dysphagia. Esophagus dilated. Dilated. - There is no endoscopic evidence of Ingram's esophagus. - Normal stomach. - Normal examined duodenum. - No specimens collecte    GALLBLADDER SURGERY  10/01/2024    TUBAL ABDOMINAL LIGATION      WISDOM TOOTH EXTRACTION  01/01/2021       Family History   Problem Relation Age of Onset    Colon polyps Mother         < 60 years old    Arthritis Mother     Heart disease Mother     Asthma Mother     Hypertension Mother     Stroke Father     Clotting disorder Father     Heart attack Father     Hypertension Father     Anemia Sister     No Known Problems Brother     No Known Problems Maternal Grandmother     Arrhythmia Paternal Grandmother     No Known Problems Daughter     No Known Problems Son     No Known Problems Maternal Aunt     No Known Problems Paternal Aunt     BRCA 1/2 Neg Hx     Breast cancer Neg Hx     Colon cancer Neg Hx     Endometrial cancer Neg Hx     Ovarian cancer Neg Hx        Social History     Socioeconomic History    Marital status: Single   Tobacco Use    Smoking status: Never    Smokeless  tobacco: Never   Vaping Use    Vaping status: Never Used   Substance and Sexual Activity    Alcohol use: Not Currently    Drug use: Never    Sexual activity: Yes     Partners: Male     Birth control/protection: Surgical           Objective   Physical Exam  Vitals and nursing note reviewed.   Constitutional:       Appearance: She is well-developed.   HENT:      Head: Normocephalic and atraumatic.      Nose: Nose normal.   Eyes:      Conjunctiva/sclera: Conjunctivae normal.      Pupils: Pupils are equal, round, and reactive to light.   Cardiovascular:      Rate and Rhythm: Normal rate and regular rhythm.      Heart sounds: Normal heart sounds.   Pulmonary:      Effort: Pulmonary effort is normal.      Breath sounds: Normal breath sounds.   Abdominal:      Palpations: Abdomen is soft.      Tenderness: There is no abdominal tenderness.   Musculoskeletal:         General: No deformity. Normal range of motion.      Cervical back: Normal range of motion.   Skin:     General: Skin is warm.   Neurological:      Mental Status: She is alert and oriented to person, place, and time.      Cranial Nerves: Cranial nerves 2-12 are intact.      Sensory: Sensation is intact.      Motor: Motor function is intact.      Coordination: Coordination is intact.   Psychiatric:         Behavior: Behavior normal.         Procedures           ED Course      EKG as interpreted by me: Normal sinus rhythm with a rate of 65, no acute ischemia or infarction      Orthostatics: wnl                                     Lab Results (last 24 hours)       Procedure Component Value Units Date/Time    CBC & Differential [[id number]]  (Normal) Collected: 03/19/25 0742    Specimen: Blood Updated: 03/19/25 0759    Narrative:      The following orders were created for panel order CBC & Differential.  Procedure                               Abnormality         Status                     ---------                               -----------         ------                      CBC Auto Differential[[id number]]        Normal              Final result                 Please view results for these tests on the individual orders.    Comprehensive Metabolic Panel [[id number]] Collected: 03/19/25 0742    Specimen: Blood Updated: 03/19/25 0817     Glucose 81 mg/dL      BUN 8 mg/dL      Creatinine 0.82 mg/dL      Sodium 137 mmol/L      Potassium 3.6 mmol/L      Chloride 103 mmol/L      CO2 22.0 mmol/L      Calcium 9.2 mg/dL      Total Protein 6.8 g/dL      Albumin 4.1 g/dL      ALT (SGPT) 9 U/L      AST (SGOT) 12 U/L      Alkaline Phosphatase 56 U/L      Total Bilirubin 0.4 mg/dL      Globulin 2.7 gm/dL      A/G Ratio 1.5 g/dL      BUN/Creatinine Ratio 9.8     Anion Gap 12.0 mmol/L      eGFR 89.5 mL/min/1.73     Narrative:      GFR Categories in Chronic Kidney Disease (CKD)      GFR Category          GFR (mL/min/1.73)    Interpretation  G1                     90 or greater         Normal or high (1)  G2                      60-89                Mild decrease (1)  G3a                   45-59                Mild to moderate decrease  G3b                   30-44                Moderate to severe decrease  G4                    15-29                Severe decrease  G5                    14 or less           Kidney failure          (1)In the absence of evidence of kidney disease, neither GFR category G1 or G2 fulfill the criteria for CKD.    eGFR calculation 2021 CKD-EPI creatinine equation, which does not include race as a factor    CBC Auto Differential [[id number]]  (Normal) Collected: 03/19/25 0742    Specimen: Blood Updated: 03/19/25 0759     WBC 10.34 10*3/mm3      RBC 4.19 10*6/mm3      Hemoglobin 12.7 g/dL      Hematocrit 39.0 %      MCV 93.1 fL      MCH 30.3 pg      MCHC 32.6 g/dL      RDW 13.1 %      RDW-SD 44.5 fl      MPV 10.5 fL      Platelets 360 10*3/mm3      Neutrophil % 62.5 %      Lymphocyte % 28.6 %      Monocyte % 7.1 %      Eosinophil % 0.9 %      Basophil % 0.5 %       Immature Grans % 0.4 %      Neutrophils, Absolute 6.47 10*3/mm3      Lymphocytes, Absolute 2.96 10*3/mm3      Monocytes, Absolute 0.73 10*3/mm3      Eosinophils, Absolute 0.09 10*3/mm3      Basophils, Absolute 0.05 10*3/mm3      Immature Grans, Absolute 0.04 10*3/mm3      nRBC 0.0 /100 WBC     COVID-19 and FLU A/B PCR, 1 HR TAT - Swab, Nasopharynx [[id number]]  (Normal) Collected: 03/19/25 0746    Specimen: Swab from Nasopharynx Updated: 03/19/25 0815     COVID19 Not Detected     Influenza A PCR Not Detected     Influenza B PCR Not Detected    Narrative:      Fact sheet for providers: https://www.fda.gov/media/608571/download    Fact sheet for patients: https://www.fda.gov/media/997989/download    Test performed by PCR.    Urinalysis With Culture If Indicated - Urine, Clean Catch [[id number]]  (Abnormal) Collected: 03/19/25 1013    Specimen: Urine, Clean Catch Updated: 03/19/25 1023     Color, UA Yellow     Appearance, UA Clear     pH, UA 6.0     Specific Gravity, UA <=1.005     Glucose, UA Negative     Ketones, UA Negative     Bilirubin, UA Negative     Blood, UA Small (1+)     Protein, UA Negative     Leuk Esterase, UA Moderate (2+)     Nitrite, UA Negative     Urobilinogen, UA 0.2 E.U./dL    Narrative:      In absence of clinical symptoms, the presence of pyuria, bacteria, and/or nitrites on the urinalysis result does not correlate with infection.    Urinalysis, Microscopic Only - Urine, Clean Catch [[id number]]  (Abnormal) Collected: 03/19/25 1013    Specimen: Urine, Clean Catch Updated: 03/19/25 1023     RBC, UA 3-5 /HPF      WBC, UA 21-50 /HPF      Bacteria, UA 4+ /HPF      Squamous Epithelial Cells, UA 3-6 /HPF      Hyaline Casts, UA 0-2 /LPF      Methodology Automated Microscopy    Urine Culture - Urine, Urine, Clean Catch [[id number]] Collected: 03/19/25 1013    Specimen: Urine, Clean Catch Updated: 03/19/25 1023    POC Urine Pregnancy [[id number]]  (Normal) Collected: 03/19/25 1014    Specimen: Urine Updated:  03/19/25 1015     HCG, Urine, QL Negative     Lot Number [id number]     Internal Positive Control Positive     Internal Negative Control Negative     Expiration Date 04/22/2026           CT Head Without Contrast   Final Result   Impression:         No acute intracranial abnormality.       This report was signed and finalized on 3/19/2025 8:20 AM by Johnson Tobin.          XR Chest 1 View   Final Result       1. No active disease in the chest.       This report was signed and finalized on 3/19/2025 8:01 AM by Torito Dia.                         Medical Decision Making  Patient is a 45-year-old female with a history of hypertension who presents to the ER with dizziness.  Patient states she has been having dizziness off and on for the last year.  Patient states she will often experience the symptoms in the morning when she wakes up.  She usually has around 2 episodes per week.  Patient states she was experiencing quite a bit of dizziness at work yesterday and noticed a headache and some low back pain.  Patient states that she went to sleep last night and woke up and had what she calls a red aura.  Patient states it was a sun pattern with rings around it.  Patient states she went back to sleep and woke up and it happened again but her symptoms have resolved this morning.  Patient states this has happened in the past.  She has not had any imaging of her brain.  She has had a mild cough.  She denies any fever, chest pain, shortness of air, abdominal pain, nausea vomiting diarrhea, urinary changes, numbness or weakness.    Differential diagnosis: Vertigo, complicated migraine, intracranial hemorrhage, UTI    Patient was given IV fluids and meclizine.  Symptoms were improving with treatment.  Labs were unremarkable.  Urinalysis was consistent with a UTI.  CT scan of the head was negative.  Chest x-ray was negative.  Patient's symptoms most consistent with vertigo versus a complicated migraine.  She has no signs of  CVA .  Visual symptoms have resolved.  Patient will be discharged home with meclizine and Cipro (allergy to other UTI meds) to follow-up with her PCP, neurology and ophthalmology.  She is to return for any worsening or new symptoms, fever or other concerns.  Patient was agreeable to the plan and discharged home.      Problems Addressed:  Acute UTI: complicated acute illness or injury  Vertigo: complicated acute illness or injury  Visual aura: complicated acute illness or injury    Amount and/or Complexity of Data Reviewed  Labs: ordered. Decision-making details documented in ED Course.  Radiology: ordered. Decision-making details documented in ED Course.  ECG/medicine tests: ordered. Decision-making details documented in ED Course.    Risk  Prescription drug management.        Final diagnoses:   Vertigo   Visual aura   Acute UTI       ED Disposition  ED Disposition       ED Disposition   Discharge    Condition   Stable    Comment   --               DARIO Nichole MD  [address] [facility] [address]  [address]  [place] [address]  [phone]    Schedule an appointment as soon as possible for a visit       Lee Lomas MD  [address] [address]  [phone]    Schedule an appointment as soon as possible for a visit       Colby García MD  [address] [facility] [address]  [phone]    Schedule an appointment as soon as possible for a visit            Medication List        New Prescriptions      ciprofloxacin 500 MG tablet  Commonly known as: CIPRO  Take 1 tablet by mouth 2 (Two) Times a Day.     meclizine 25 MG tablet  Commonly known as: ANTIVERT  Take 1 tablet by mouth 3 (Three) Times a Day As Needed for Dizziness.               Where to Get Your Medications        These medications were sent to [facility]/pharmacy #[id number] - [place], [place] - [address] AT ACROSS FROM [address]  [phone] [facility] [phone]   [address] [facility] [address]      Phone:  [phone]   ciprofloxacin 500 MG tablet  meclizine 25 MG tablet            Shannan Baig MD  03/19/25 9551

## 2025-03-19 NOTE — Clinical Note
[facility] EMERGENCY DEPARTMENT  [address]  [facility] [address]  Phone: [phone]    Zena Zheng was seen and treated in our emergency department on 3/19/2025.  She may return to work on 03/21/2025.         Thank you for choosing [facility].    Shannan Baig MD       Initiate Treatment: Zoryve 0.3% cream once daily to face and chest Render In Strict Bullet Format?: No Detail Level: Zone Initiate Treatment: Triamcinolone 0.1% Cream BID to affected area on chest for up to two weeks

## 2025-03-22 LAB
BACTERIA SPEC AEROBE CULT: ABNORMAL
BACTERIA SPEC AEROBE CULT: ABNORMAL

## 2025-03-24 ENCOUNTER — TELEPHONE (OUTPATIENT)
Dept: PSYCHIATRY | Age: 45
End: 2025-03-24

## 2025-03-24 DIAGNOSIS — R44.0 AUDITORY HALLUCINATIONS: Primary | ICD-10-CM

## 2025-03-24 DIAGNOSIS — R44.1 VISUAL HALLUCINATIONS: ICD-10-CM

## 2025-03-24 LAB
QT INTERVAL: 422 MS
QTC INTERVAL: 438 MS

## 2025-03-24 NOTE — TELEPHONE ENCOUNTER
Called pt to schedule an appt from a referral.    Scheduled with Srinath Linn for 03/27/25 @ 10:00.  Notified referring provider.     Electronically signed by Jess Sidhu MA on 3/24/2025 at 2:39 PM

## 2025-03-26 ENCOUNTER — TELEPHONE (OUTPATIENT)
Dept: PSYCHIATRY | Age: 45
End: 2025-03-26

## 2025-03-26 NOTE — TELEPHONE ENCOUNTER
Called patient to remind them of their appointment   -Pt confirmed      Reminded patient of their     copay for their appointment. Reminded patient to complete their visit pre-check/digital registration in [employer].    Electronically signed by Alicia D Giraldo-Reyes on 3/26/2025 at 2:07 PM

## 2025-03-27 ENCOUNTER — OFFICE VISIT (OUTPATIENT)
Dept: PSYCHIATRY | Age: 45
End: 2025-03-27
Payer: COMMERCIAL

## 2025-03-27 VITALS
HEART RATE: 63 BPM | TEMPERATURE: 97.8 F | BODY MASS INDEX: 28.87 KG/M2 | SYSTOLIC BLOOD PRESSURE: 120 MMHG | DIASTOLIC BLOOD PRESSURE: 73 MMHG | OXYGEN SATURATION: 99 % | HEIGHT: 66 IN | WEIGHT: 179.6 LBS

## 2025-03-27 DIAGNOSIS — G47.00 INSOMNIA, UNSPECIFIED TYPE: ICD-10-CM

## 2025-03-27 DIAGNOSIS — F41.1 GENERALIZED ANXIETY DISORDER: ICD-10-CM

## 2025-03-27 DIAGNOSIS — G47.10 HYPERSOMNIA: ICD-10-CM

## 2025-03-27 DIAGNOSIS — F33.0 MAJOR DEPRESSIVE DISORDER, RECURRENT, MILD: Primary | ICD-10-CM

## 2025-03-27 PROCEDURE — 90792 PSYCH DIAG EVAL W/MED SRVCS: CPT

## 2025-03-27 PROCEDURE — 1036F TOBACCO NON-USER: CPT

## 2025-03-27 RX ORDER — TRAZODONE HYDROCHLORIDE 50 MG/1
50 TABLET ORAL NIGHTLY PRN
Qty: 30 TABLET | Refills: 1 | Status: SHIPPED | OUTPATIENT
Start: 2025-03-27

## 2025-03-27 RX ORDER — DULOXETIN HYDROCHLORIDE 30 MG/1
30 CAPSULE, DELAYED RELEASE ORAL EVERY MORNING
Qty: 30 CAPSULE | Refills: 1 | Status: SHIPPED | OUTPATIENT
Start: 2025-03-27

## 2025-03-27 RX ORDER — LAMOTRIGINE 25 MG/1
TABLET ORAL
Qty: 105 TABLET | Refills: 0 | Status: SHIPPED | OUTPATIENT
Start: 2025-03-27 | End: 2025-04-24

## 2025-03-27 ASSESSMENT — PATIENT HEALTH QUESTIONNAIRE - PHQ9
1. LITTLE INTEREST OR PLEASURE IN DOING THINGS: MORE THAN HALF THE DAYS
SUM OF ALL RESPONSES TO PHQ QUESTIONS 1-9: 16
6. FEELING BAD ABOUT YOURSELF - OR THAT YOU ARE A FAILURE OR HAVE LET YOURSELF OR YOUR FAMILY DOWN: MORE THAN HALF THE DAYS
5. POOR APPETITE OR OVEREATING: SEVERAL DAYS
SUM OF ALL RESPONSES TO PHQ QUESTIONS 1-9: 16
SUM OF ALL RESPONSES TO PHQ QUESTIONS 1-9: 16
7. TROUBLE CONCENTRATING ON THINGS, SUCH AS READING THE NEWSPAPER OR WATCHING TELEVISION: NEARLY EVERY DAY
3. TROUBLE FALLING OR STAYING ASLEEP: NEARLY EVERY DAY
9. THOUGHTS THAT YOU WOULD BE BETTER OFF DEAD, OR OF HURTING YOURSELF: NOT AT ALL
2. FEELING DOWN, DEPRESSED OR HOPELESS: MORE THAN HALF THE DAYS
8. MOVING OR SPEAKING SO SLOWLY THAT OTHER PEOPLE COULD HAVE NOTICED. OR THE OPPOSITE, BEING SO FIGETY OR RESTLESS THAT YOU HAVE BEEN MOVING AROUND A LOT MORE THAN USUAL: NOT AT ALL
4. FEELING TIRED OR HAVING LITTLE ENERGY: NEARLY EVERY DAY
10. IF YOU CHECKED OFF ANY PROBLEMS, HOW DIFFICULT HAVE THESE PROBLEMS MADE IT FOR YOU TO DO YOUR WORK, TAKE CARE OF THINGS AT HOME, OR GET ALONG WITH OTHER PEOPLE: VERY DIFFICULT
SUM OF ALL RESPONSES TO PHQ QUESTIONS 1-9: 16

## 2025-03-27 ASSESSMENT — ENCOUNTER SYMPTOMS
GASTROINTESTINAL NEGATIVE: 1
ALLERGIC/IMMUNOLOGIC NEGATIVE: 1
RESPIRATORY NEGATIVE: 1
EYES NEGATIVE: 1

## 2025-03-27 NOTE — PROGRESS NOTES
them)    (somatic delusion - e.g., believing one's sinuses have been infested by worms)    (delusions of reference - e.g., believing dialogue on a TV program is directed specifically towards the patient)    (delusions of control - e.g., believing one's thoughts and movements are controlled by planetary overlords)    Patient's perception: negative    (Spiritual or cultural context of symptoms, reality testing)    ADHD symptoms: negative    (able to focus and concentrate, scattered thoughts, disorganized thoughts)    Eating Disorder symptoms:  negative    (binging, purging, excessive exercising)        MENTAL STATUS EXAMINATION  Appearance: Appropriately groomed. Made good eye contact.  Gait stable.  No abnormal movements or tremor.  Behavior: Calm, cooperative, and socially appropriate. No psychomotor retardation/agitation appreciated.   Speech: Normal in tone, volume, and quality. No slurring, dysarthria or pressured speech noted.   Mood: \"Ok\"   Affect: Mood congruent.   Thought Process: Appears linear, logical and goal oriented. Causality appears intact.   Thought Content: Denies active suicidal and homicidal ideations. No overt delusions or paranoia appreciated.   Perceptions: Denies auditory or visual hallucinations at present time. Not responding to internal stimuli.   Concentration: Intact.   Orientation: to person, place, date, and situation.   Language: Intact.   Fund of information: Intact.   Memory: Recent and remote appear intact.   Impulsivity: Limited.   Neurovegitative: Fair appetite and sleep.   Insight: Fair.   Judgment: Fair.    Cognition:    Can spell \"world\" backwards: yes     Can do serial 7's: yes    Lab Results   Component Value Date     08/14/2023    K 4.1 08/14/2023     08/14/2023    CO2 26 08/14/2023    BUN 8 08/14/2023    CREATININE 0.9 08/14/2023    GLUCOSE 96 08/14/2023    CALCIUM 10.0 08/14/2023    BILITOT 0.4 08/14/2023    ALKPHOS 74 08/14/2023    AST 14 08/14/2023    ALT 16

## 2025-03-28 ENCOUNTER — TELEPHONE (OUTPATIENT)
Dept: INTERNAL MEDICINE | Facility: CLINIC | Age: 45
End: 2025-03-28
Payer: MEDICAID

## 2025-03-28 DIAGNOSIS — R42 VERTIGO: ICD-10-CM

## 2025-03-28 DIAGNOSIS — Z76.0 MEDICATION REFILL: ICD-10-CM

## 2025-03-28 RX ORDER — MECLIZINE HYDROCHLORIDE 25 MG/1
25 TABLET ORAL 3 TIMES DAILY PRN
Qty: 60 TABLET | Refills: 1 | Status: SHIPPED | OUTPATIENT
Start: 2025-03-28

## 2025-03-28 NOTE — TELEPHONE ENCOUNTER
Medication was prescribed by emergency room and requesting refill from this office. Medication pended to chart for review.

## 2025-03-28 NOTE — TELEPHONE ENCOUNTER
Caller: Norm Zena M    Relationship: Self    Best call back number: [phone]     Requested Prescriptions:   Requested Prescriptions     Pending Prescriptions Disp Refills    meclizine (ANTIVERT) 25 MG tablet 12 tablet 0     Sig: Take 1 tablet by mouth 3 (Three) Times a Day As Needed for Dizziness.        Pharmacy where request should be sent: [facility]/PHARMACY #[id number] - [place], [place] - [address] AT ACROSS FROM [facility] [phone] [facility] [phone]      Last office visit with prescribing clinician: 9/10/2024   Last telemedicine visit with prescribing clinician: Visit date not found   Next office visit with prescribing clinician: 6/20/2025     Additional details provided by patient: ORIGINALLY PRESCRIBED BY EMERGENCY ROOM.     Does the patient have less than a 3 day supply:  [x] Yes  [] No    Would you like a call back once the refill request has been completed: [] Yes [x] No    If the office needs to give you a call back, can they leave a voicemail: [] Yes [x] No    Otto Barba III, [employer] Rep   03/28/25 10:09 CDT

## 2025-04-04 ENCOUNTER — OFFICE VISIT (OUTPATIENT)
Dept: INTERNAL MEDICINE | Facility: CLINIC | Age: 45
End: 2025-04-04
Payer: MEDICAID

## 2025-04-04 VITALS
DIASTOLIC BLOOD PRESSURE: 82 MMHG | HEIGHT: 67 IN | BODY MASS INDEX: 28.44 KG/M2 | HEART RATE: 76 BPM | SYSTOLIC BLOOD PRESSURE: 134 MMHG | OXYGEN SATURATION: 98 %

## 2025-04-04 DIAGNOSIS — H53.9 VISUAL DISTURBANCES: ICD-10-CM

## 2025-04-04 DIAGNOSIS — R42 DIZZINESS: Primary | ICD-10-CM

## 2025-04-04 RX ORDER — TRAZODONE HYDROCHLORIDE 50 MG/1
50 TABLET ORAL NIGHTLY
COMMUNITY
Start: 2025-03-28
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
## 2025-04-07 RX ORDER — DICLOFENAC SODIUM 75 MG/1
75 TABLET, DELAYED RELEASE ORAL EVERY 12 HOURS SCHEDULED
OUTPATIENT
Start: 2025-04-07

## 2025-04-07 NOTE — TELEPHONE ENCOUNTER
Rx Refill Note  Requested Prescriptions     Pending Prescriptions Disp Refills    diclofenac (VOLTAREN) 75 MG EC tablet       Sig: Take 1 tablet by mouth Every 12 (Twelve) Hours.      Last office visit with prescribing clinician: 9/10/2024   Last telemedicine visit with prescribing clinician: Visit date not found   Next office visit with prescribing clinician: 6/20/2025                         Would you like a call back once the refill request has been completed: [] Yes [] No    If the office needs to give you a call back, can they leave a voicemail: [] Yes [] No    BALAJI Kay  04/07/25, 08:26 CDT    Medication looks like it may be listed as historical due to no provider being listed.

## 2025-04-08 NOTE — PROGRESS NOTES
Orthopaedic Clinic Note - Established Patient    NAME:  Roxann Colindres   : 1980  MRN: [id number]    2025    CHIEF COMPLAINT:  follow up for left shoulder    HISTORY OF PRESENT ILLNESS:   The patient is a 45 y.o. female who returns today for follow up of  left shoulder pain following a fall in November. She has seen both Dr. Dillard and Luis Tidwell. Treatment has consisted of oral anti-inflammatories, corticosteroid injection, physical therapy.  Since last visit, pain has returned.  She is inquiring about a repeat injection and further workup today.       Past Medical History:        Diagnosis Date    Abnormal Pap smear of cervix     Allergic     Anemia     with pregnancy    ASCUS favor benign 10/2015    Depression     Fibromyalgia     Recurrent miscarriages        Past Surgical History:        Procedure Laterality Date    ABDOMEN SURGERY  10-1-24    ANKLE SURGERY Left     aprox in     DENTAL SURGERY      Tooth Implant    DILATION AND CURETTAGE OF UTERUS      LEEP N/A 2020    LEEP performed by Chica Copeland [facility] at [facility] OR    SALPINGECTOMY Bilateral     Dr. Burrell       Current Medications:   Prior to Admission medications    Medication Sig Start Date End Date Taking? Authorizing Provider   DULoxetine 40 MG CPEP Take 40 mg by mouth every morning 25  Yes Srinath Linn APRN - CNP   lamoTRIgine (LAMICTAL) 100 MG tablet Take 1 tablet by mouth daily 25 Yes Srinath Linn APRN - CNP   traZODone (DESYREL) 50 MG tablet Take 1 tablet by mouth nightly as needed for Sleep 25  Yes Srinath Linn APRN - CNP   diclofenac (VOLTAREN) 75 MG EC tablet Take 1 tablet by mouth 2 times daily 25  Yes Luis Tidwell PA   fluticasone (FLONASE) 50 MCG/ACT nasal spray  24  Yes Maurice Toure MD   NONFORMULARY Pt states that there are 2 more meds she takes name unknown for acne and rashs   Yes Maurice Toure MD   tiZANidine (ZANAFLEX) 4 MG tablet Take 1

## 2025-04-11 ENCOUNTER — TELEPHONE (OUTPATIENT)
Dept: PSYCHIATRY | Age: 45
End: 2025-04-11

## 2025-04-11 NOTE — TELEPHONE ENCOUNTER
Called and confirmed appt with pt for 4/14 @ 3:30 pm     Electronically signed by Meg Benz on 4/11/2025 at 10:31 AM

## 2025-04-14 ENCOUNTER — OFFICE VISIT (OUTPATIENT)
Dept: PSYCHIATRY | Age: 45
End: 2025-04-14
Payer: COMMERCIAL

## 2025-04-14 VITALS
SYSTOLIC BLOOD PRESSURE: 114 MMHG | WEIGHT: 180.1 LBS | HEART RATE: 65 BPM | HEIGHT: 66 IN | OXYGEN SATURATION: 99 % | DIASTOLIC BLOOD PRESSURE: 75 MMHG | TEMPERATURE: 98.1 F | BODY MASS INDEX: 28.94 KG/M2

## 2025-04-14 DIAGNOSIS — F41.1 GENERALIZED ANXIETY DISORDER: ICD-10-CM

## 2025-04-14 DIAGNOSIS — F33.0 MAJOR DEPRESSIVE DISORDER, RECURRENT, MILD: Primary | ICD-10-CM

## 2025-04-14 DIAGNOSIS — G47.00 INSOMNIA, UNSPECIFIED TYPE: ICD-10-CM

## 2025-04-14 PROCEDURE — G8419 CALC BMI OUT NRM PARAM NOF/U: HCPCS

## 2025-04-14 PROCEDURE — 1036F TOBACCO NON-USER: CPT

## 2025-04-14 PROCEDURE — 99214 OFFICE O/P EST MOD 30 MIN: CPT

## 2025-04-14 PROCEDURE — G8428 CUR MEDS NOT DOCUMENT: HCPCS

## 2025-04-14 RX ORDER — DULOXETINE 40 MG/1
40 CAPSULE, DELAYED RELEASE ORAL EVERY MORNING
Qty: 30 CAPSULE | Refills: 2 | Status: SHIPPED | OUTPATIENT
Start: 2025-04-14

## 2025-04-14 RX ORDER — TRAZODONE HYDROCHLORIDE 50 MG/1
50 TABLET ORAL NIGHTLY PRN
Qty: 60 TABLET | Refills: 2 | Status: SHIPPED | OUTPATIENT
Start: 2025-04-14

## 2025-04-14 RX ORDER — LAMOTRIGINE 100 MG/1
100 TABLET ORAL DAILY
Qty: 30 TABLET | Refills: 2 | Status: SHIPPED | OUTPATIENT
Start: 2025-04-14 | End: 2025-07-13

## 2025-04-14 ASSESSMENT — ENCOUNTER SYMPTOMS
ALLERGIC/IMMUNOLOGIC NEGATIVE: 1
GASTROINTESTINAL NEGATIVE: 1
RESPIRATORY NEGATIVE: 1
EYES NEGATIVE: 1

## 2025-04-14 NOTE — PROGRESS NOTES
4/14/2025 3:57 PM   Progress Note          Roxann Colindres 1980      Chief Complaint   Patient presents with    Follow-up    Medication Check         Subjective:    Patient is a 45 y.o. female with history of  HTN, depression, fibromyalgia, anxiety, headaches presents for follow up. Last seen in clinic or telehealth on 3/27/2025 and prior records were reviewed. Lamictal increased last visit. Trazodone started for sleep, Cymbalta restarted.     Seen in office, she is bright, calm, and cooperative. Has been doing yard work, trying to stay active and exercise more, has weight bench and elliptical at home.   Feels her mood has improved with medication changes, less tearful, feels more stable. Some lingering anxiety, would like to increase dose of Cymbalta. Currently taking 75mg of Lamictal. Reports compliance with medications, sleep is improving with Trazodone, taking 50mg. Denies SI/HI or AV hallucinations.       Absent suicidal ideations.   Reports medication compliance as good.     Sleep: improving, some nights waking up early and unable to return to sleep. Snores. Lots of daytime fatigue. Sleep study pending.      Appetite: ok     Caffeine: one hot tea a day     Previous medication trials:   Cymbalta  Gabapentin  Lamictal  Wellbutrin (on edge)  Prozac  Lexapro  Celexa  Trazodone     Mental health contact: Lost to follow-up      SUBSTANCE USE HISTORY  Alcohol: Denies   illicit drug use: Denies   Marijuana: Denies   Tobacco use: Denies  Vape: Denies     Objective:      /75   Pulse 65   Temp 98.1 °F (36.7 °C)   Ht 1.676 m (5' 6\")   Wt 81.7 kg (180 lb 1.6 oz)   SpO2 99%   BMI 29.07 kg/m²       Review of Systems   Review of Systems   Constitutional:  Positive for fatigue.   HENT: Negative.     Eyes: Negative.    Respiratory: Negative.     Cardiovascular: Negative.    Gastrointestinal: Negative.    Endocrine: Negative.    Genitourinary: Negative.    Musculoskeletal: Negative.    Skin: Negative.

## 2025-04-15 ENCOUNTER — OFFICE VISIT (OUTPATIENT)
Dept: PSYCHIATRY | Age: 45
End: 2025-04-15

## 2025-04-15 DIAGNOSIS — F33.0 MILD RECURRENT MAJOR DEPRESSION: ICD-10-CM

## 2025-04-15 DIAGNOSIS — F41.1 GENERALIZED ANXIETY DISORDER: Primary | ICD-10-CM

## 2025-04-15 ASSESSMENT — PATIENT HEALTH QUESTIONNAIRE - PHQ9
10. IF YOU CHECKED OFF ANY PROBLEMS, HOW DIFFICULT HAVE THESE PROBLEMS MADE IT FOR YOU TO DO YOUR WORK, TAKE CARE OF THINGS AT HOME, OR GET ALONG WITH OTHER PEOPLE: VERY DIFFICULT
SUM OF ALL RESPONSES TO PHQ QUESTIONS 1-9: 15
8. MOVING OR SPEAKING SO SLOWLY THAT OTHER PEOPLE COULD HAVE NOTICED. OR THE OPPOSITE, BEING SO FIGETY OR RESTLESS THAT YOU HAVE BEEN MOVING AROUND A LOT MORE THAN USUAL: NEARLY EVERY DAY
4. FEELING TIRED OR HAVING LITTLE ENERGY: MORE THAN HALF THE DAYS
SUM OF ALL RESPONSES TO PHQ QUESTIONS 1-9: 15
3. TROUBLE FALLING OR STAYING ASLEEP: MORE THAN HALF THE DAYS
7. TROUBLE CONCENTRATING ON THINGS, SUCH AS READING THE NEWSPAPER OR WATCHING TELEVISION: MORE THAN HALF THE DAYS
1. LITTLE INTEREST OR PLEASURE IN DOING THINGS: SEVERAL DAYS
6. FEELING BAD ABOUT YOURSELF - OR THAT YOU ARE A FAILURE OR HAVE LET YOURSELF OR YOUR FAMILY DOWN: MORE THAN HALF THE DAYS
SUM OF ALL RESPONSES TO PHQ QUESTIONS 1-9: 15
2. FEELING DOWN, DEPRESSED OR HOPELESS: MORE THAN HALF THE DAYS
SUM OF ALL RESPONSES TO PHQ QUESTIONS 1-9: 15
9. THOUGHTS THAT YOU WOULD BE BETTER OFF DEAD, OR OF HURTING YOURSELF: NOT AT ALL
5. POOR APPETITE OR OVEREATING: SEVERAL DAYS

## 2025-04-15 ASSESSMENT — ANXIETY QUESTIONNAIRES
IF YOU CHECKED OFF ANY PROBLEMS ON THIS QUESTIONNAIRE, HOW DIFFICULT HAVE THESE PROBLEMS MADE IT FOR YOU TO DO YOUR WORK, TAKE CARE OF THINGS AT HOME, OR GET ALONG WITH OTHER PEOPLE: EXTREMELY DIFFICULT
1. FEELING NERVOUS, ANXIOUS, OR ON EDGE: NEARLY EVERY DAY
GAD7 TOTAL SCORE: 18
4. TROUBLE RELAXING: NEARLY EVERY DAY
7. FEELING AFRAID AS IF SOMETHING AWFUL MIGHT HAPPEN: MORE THAN HALF THE DAYS
6. BECOMING EASILY ANNOYED OR IRRITABLE: MORE THAN HALF THE DAYS
2. NOT BEING ABLE TO STOP OR CONTROL WORRYING: NEARLY EVERY DAY
3. WORRYING TOO MUCH ABOUT DIFFERENT THINGS: NEARLY EVERY DAY
5. BEING SO RESTLESS THAT IT IS HARD TO SIT STILL: MORE THAN HALF THE DAYS
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
## 2025-04-17 ENCOUNTER — OFFICE VISIT (OUTPATIENT)
Age: 45
End: 2025-04-17

## 2025-04-17 VITALS — WEIGHT: 185 LBS | BODY MASS INDEX: 29.73 KG/M2 | HEIGHT: 66 IN

## 2025-04-17 DIAGNOSIS — S46.912D STRAIN OF LEFT SHOULDER, SUBSEQUENT ENCOUNTER: Primary | ICD-10-CM

## 2025-04-17 RX ORDER — TRIAMCINOLONE ACETONIDE 40 MG/ML
40 INJECTION, SUSPENSION INTRA-ARTICULAR; INTRAMUSCULAR ONCE
Status: COMPLETED | OUTPATIENT
Start: 2025-04-17 | End: 2025-04-17

## 2025-04-17 RX ORDER — LIDOCAINE HYDROCHLORIDE 10 MG/ML
2 INJECTION, SOLUTION INFILTRATION; PERINEURAL ONCE
Status: COMPLETED | OUTPATIENT
Start: 2025-04-17 | End: 2025-04-17

## 2025-04-17 RX ORDER — BUPIVACAINE HYDROCHLORIDE 2.5 MG/ML
2 INJECTION, SOLUTION INFILTRATION; PERINEURAL ONCE
Status: COMPLETED | OUTPATIENT
Start: 2025-04-17 | End: 2025-04-17

## 2025-04-17 RX ADMIN — TRIAMCINOLONE ACETONIDE 40 MG: 40 INJECTION, SUSPENSION INTRA-ARTICULAR; INTRAMUSCULAR at 15:48

## 2025-04-17 RX ADMIN — LIDOCAINE HYDROCHLORIDE 2 ML: 10 INJECTION, SOLUTION INFILTRATION; PERINEURAL at 15:47

## 2025-04-17 RX ADMIN — BUPIVACAINE HYDROCHLORIDE 5 MG: 2.5 INJECTION, SOLUTION INFILTRATION; PERINEURAL at 15:47

## 2025-04-23 ENCOUNTER — APPOINTMENT (OUTPATIENT)
Dept: CT IMAGING | Facility: HOSPITAL | Age: 45
End: 2025-04-23
Payer: MEDICAID

## 2025-04-23 ENCOUNTER — HOSPITAL ENCOUNTER (EMERGENCY)
Facility: HOSPITAL | Age: 45
Discharge: HOME | End: 2025-04-23
Attending: EMERGENCY MEDICINE | Admitting: EMERGENCY MEDICINE
Payer: MEDICAID

## 2025-04-23 VITALS
SYSTOLIC BLOOD PRESSURE: 116 MMHG | HEART RATE: 69 BPM | RESPIRATION RATE: 16 BRPM | BODY MASS INDEX: 29.33 KG/M2 | HEIGHT: 66 IN | TEMPERATURE: 98.1 F | OXYGEN SATURATION: 97 % | DIASTOLIC BLOOD PRESSURE: 71 MMHG | WEIGHT: 182.5 LBS

## 2025-04-23 DIAGNOSIS — R55 SYNCOPE AND COLLAPSE: ICD-10-CM

## 2025-04-23 DIAGNOSIS — R42 VERTIGO: Primary | ICD-10-CM

## 2025-04-23 LAB
ALBUMIN SERPL-MCNC: 4.2 G/DL (ref 3.5–5.2)
ALBUMIN/GLOB SERPL: 1.7 G/DL
ALP SERPL-CCNC: 61 U/L (ref 39–117)
ALT SERPL W P-5'-P-CCNC: 9 U/L (ref 1–33)
ANION GAP SERPL CALCULATED.3IONS-SCNC: 12 MMOL/L (ref 5–15)
AST SERPL-CCNC: 12 U/L (ref 1–32)
BASOPHILS # BLD AUTO: 0.05 10*3/MM3 (ref 0–0.2)
BASOPHILS NFR BLD AUTO: 0.5 % (ref 0–1.5)
BILIRUB SERPL-MCNC: 0.7 MG/DL (ref 0–1.2)
BUN SERPL-MCNC: 7 MG/DL (ref 6–20)
BUN/CREAT SERPL: 10.1 (ref 7–25)
CALCIUM SPEC-SCNC: 9.3 MG/DL (ref 8.6–10.5)
CHLORIDE SERPL-SCNC: 102 MMOL/L (ref 98–107)
CO2 SERPL-SCNC: 24 MMOL/L (ref 22–29)
CREAT SERPL-MCNC: 0.69 MG/DL (ref 0.57–1)
DEPRECATED RDW RBC AUTO: 41.8 FL (ref 37–54)
EGFRCR SERPLBLD CKD-EPI 2021: 108.5 ML/MIN/1.73
EOSINOPHIL # BLD AUTO: 0.02 10*3/MM3 (ref 0–0.4)
EOSINOPHIL NFR BLD AUTO: 0.2 % (ref 0.3–6.2)
ERYTHROCYTE [DISTWIDTH] IN BLOOD BY AUTOMATED COUNT: 12.4 % (ref 12.3–15.4)
GEN 5 1HR TROPONIN T REFLEX: <6 NG/L
GLOBULIN UR ELPH-MCNC: 2.5 GM/DL
GLUCOSE SERPL-MCNC: 100 MG/DL (ref 65–99)
HCT VFR BLD AUTO: 38.2 % (ref 34–46.6)
HGB BLD-MCNC: 12.1 G/DL (ref 12–15.9)
IMM GRANULOCYTES # BLD AUTO: 0.04 10*3/MM3 (ref 0–0.05)
IMM GRANULOCYTES NFR BLD AUTO: 0.4 % (ref 0–0.5)
LYMPHOCYTES # BLD AUTO: 3.14 10*3/MM3 (ref 0.7–3.1)
LYMPHOCYTES NFR BLD AUTO: 31.1 % (ref 19.6–45.3)
MCH RBC QN AUTO: 29.2 PG (ref 26.6–33)
MCHC RBC AUTO-ENTMCNC: 31.7 G/DL (ref 31.5–35.7)
MCV RBC AUTO: 92.3 FL (ref 79–97)
MONOCYTES # BLD AUTO: 0.74 10*3/MM3 (ref 0.1–0.9)
MONOCYTES NFR BLD AUTO: 7.3 % (ref 5–12)
NEUTROPHILS NFR BLD AUTO: 6.12 10*3/MM3 (ref 1.7–7)
NEUTROPHILS NFR BLD AUTO: 60.5 % (ref 42.7–76)
NRBC BLD AUTO-RTO: 0 /100 WBC (ref 0–0.2)
PLATELET # BLD AUTO: 400 10*3/MM3 (ref 140–450)
PMV BLD AUTO: 10.1 FL (ref 6–12)
POTASSIUM SERPL-SCNC: 3.4 MMOL/L (ref 3.5–5.2)
PROT SERPL-MCNC: 6.7 G/DL (ref 6–8.5)
RBC # BLD AUTO: 4.14 10*6/MM3 (ref 3.77–5.28)
SODIUM SERPL-SCNC: 138 MMOL/L (ref 136–145)
TROPONIN T NUMERIC DELTA: NORMAL
TROPONIN T SERPL HS-MCNC: <6 NG/L
WBC NRBC COR # BLD AUTO: 10.11 10*3/MM3 (ref 3.4–10.8)

## 2025-04-23 PROCEDURE — 99285 EMERGENCY DEPT VISIT HI MDM: CPT

## 2025-04-23 PROCEDURE — 25510000001 IOPAMIDOL PER 1 ML: Performed by: EMERGENCY MEDICINE

## 2025-04-23 PROCEDURE — 80053 COMPREHEN METABOLIC PANEL: CPT | Performed by: EMERGENCY MEDICINE

## 2025-04-23 PROCEDURE — 96375 TX/PRO/DX INJ NEW DRUG ADDON: CPT

## 2025-04-23 PROCEDURE — 36415 COLL VENOUS BLD VENIPUNCTURE: CPT

## 2025-04-23 PROCEDURE — 70450 CT HEAD/BRAIN W/O DYE: CPT

## 2025-04-23 PROCEDURE — 84484 ASSAY OF TROPONIN QUANT: CPT | Performed by: EMERGENCY MEDICINE

## 2025-04-23 PROCEDURE — 25810000003 SODIUM CHLORIDE 0.9 % SOLUTION: Performed by: EMERGENCY MEDICINE

## 2025-04-23 PROCEDURE — 70496 CT ANGIOGRAPHY HEAD: CPT

## 2025-04-23 PROCEDURE — 25010000002 MORPHINE PER 10 MG: Performed by: EMERGENCY MEDICINE

## 2025-04-23 PROCEDURE — 25010000002 ONDANSETRON PER 1 MG: Performed by: EMERGENCY MEDICINE

## 2025-04-23 PROCEDURE — 96374 THER/PROPH/DIAG INJ IV PUSH: CPT

## 2025-04-23 PROCEDURE — 93005 ELECTROCARDIOGRAM TRACING: CPT | Performed by: EMERGENCY MEDICINE

## 2025-04-23 PROCEDURE — 93010 ELECTROCARDIOGRAM REPORT: CPT | Performed by: INTERNAL MEDICINE

## 2025-04-23 PROCEDURE — 70498 CT ANGIOGRAPHY NECK: CPT

## 2025-04-23 PROCEDURE — 85025 COMPLETE CBC W/AUTO DIFF WBC: CPT | Performed by: EMERGENCY MEDICINE

## 2025-04-23 RX ORDER — IOPAMIDOL 755 MG/ML
100 INJECTION, SOLUTION INTRAVASCULAR
Status: COMPLETED | OUTPATIENT
Start: 2025-04-23 | End: 2025-04-23

## 2025-04-23 RX ORDER — SODIUM CHLORIDE 0.9 % (FLUSH) 0.9 %
10 SYRINGE (ML) INJECTION AS NEEDED
Status: DISCONTINUED | OUTPATIENT
Start: 2025-04-23 | End: 2025-04-23 | Stop reason: HOSPADM

## 2025-04-23 RX ORDER — TRAZODONE HYDROCHLORIDE 50 MG/1
50 TABLET ORAL NIGHTLY PRN
Qty: 90 TABLET | Refills: 2 | Status: SHIPPED | OUTPATIENT
Start: 2025-04-23 | End: 2025-07-22

## 2025-04-23 RX ORDER — MECLIZINE HYDROCHLORIDE 25 MG/1
25 TABLET ORAL ONCE
Status: COMPLETED | OUTPATIENT
Start: 2025-04-23 | End: 2025-04-23

## 2025-04-23 RX ORDER — ONDANSETRON 2 MG/ML
4 INJECTION INTRAMUSCULAR; INTRAVENOUS ONCE
Status: COMPLETED | OUTPATIENT
Start: 2025-04-23 | End: 2025-04-23

## 2025-04-23 RX ADMIN — ONDANSETRON 4 MG: 2 INJECTION INTRAMUSCULAR; INTRAVENOUS at 07:53

## 2025-04-23 RX ADMIN — IOPAMIDOL 100 ML: 755 INJECTION, SOLUTION INTRAVENOUS at 09:33

## 2025-04-23 RX ADMIN — SODIUM CHLORIDE 1000 ML: 9 INJECTION, SOLUTION INTRAVENOUS at 07:53

## 2025-04-23 RX ADMIN — MECLIZINE HYDROCLORIDE 25 MG: 25 TABLET ORAL at 07:53

## 2025-04-23 RX ADMIN — MORPHINE SULFATE 4 MG: 4 INJECTION, SOLUTION INTRAMUSCULAR; INTRAVENOUS at 07:53

## 2025-04-23 NOTE — Clinical Note
[facility] EMERGENCY DEPARTMENT  [address]  [facility] [address]  Phone: [phone]    Zena Zheng was seen and treated in our emergency department on 4/23/2025.  She may return to work on 04/25/2025.         Thank you for choosing [facility].    Shannan Baig MD

## 2025-04-23 NOTE — ED PROVIDER NOTES
Subjective   History of Present Illness  Patient is a 45-year-old female with a history of hypertension who presents to the ER with syncope.  Patient states this morning she felt like she needed to stretch her neck and bent her head forward to stretch her neck.  Patient states she had a sudden onset of dizziness.  Patient states she tried to get up to walk and started walking in circles and kind of leaning to the right.  Patient states she made it to a chair and then briefly passed out.  Patient states she does feel better now and her dizziness has improved but she now has a generalized headache.  She describes it as an achy pain.  There was no thunderclap onset.  She denies any fever, chest pain, shortness of air, abdominal pain, nausea vomiting diarrhea, urinary changes, numbness or weakness, neck or back pain, extremity pain.      Review of Systems   Constitutional: Negative.    Eyes: Negative.    Respiratory: Negative.     Cardiovascular: Negative.    Gastrointestinal: Negative.    Endocrine: Negative.    Genitourinary: Negative.    Musculoskeletal: Negative.    Skin: Negative.    Allergic/Immunologic: Negative.    Neurological:  Positive for dizziness, syncope and headaches.   Hematological: Negative.    Psychiatric/Behavioral: Negative.     All other systems reviewed and are negative.      Past Medical History:   Diagnosis Date    Abnormal ECG 8-25-24    Slow heart    Anxiety     Asthma 2021    Trouble catching breath    BV (bacterial vaginosis)     Depression     Fibromyalgia     Fibromyalgia, primary 07/2019    Gallbladder sludge 09/04/2024    Headache     Hypertension     Neuropathy     Scoliosis 09/2024    Tachycardia        Allergies   Allergen Reactions    Adhesive Tape Rash    Amoxicillin Rash     Beta lactam allergy details  Antibiotic reaction: rash, hives  Age at reaction: adult  Dose to reaction time: (!) minutes  Reason for antibiotic: other  Epinephrine required for reaction?: no  Tolerated  antibiotics: unknown        Clindamycin/Lincomycin Rash    Doxycycline Hives and Itching    Erythromycin Hives and Itching    Sulfa Antibiotics Hives and Itching    Tape Rash       Past Surgical History:   Procedure Laterality Date    ANKLE SURGERY      CHOLECYSTECTOMY N/A 10/01/2024    Procedure: LAPAROSCOPIC ROBOTIC ASSISTED CHOLECYSTECTOMY WTIH PRE-OPERATIVE INDOCYANINE GREEN INJECTION;  Surgeon: Jolly Dia MD;  Location: [facility] OR;  Service: Robotics - [facility];  Laterality: N/A;    COLONOSCOPY N/A 09/19/2024    Dr. Anne-One 7 mm adenomatous  polyp in the rectum    DILATATION AND CURETTAGE      ENDOSCOPY N/A 12/04/2023    No endoscopic esophageal abnormality to explain patient's dysphagia. Esophagus dilated. Dilated. - There is no endoscopic evidence of Ingram's esophagus. - Normal stomach. - Normal examined duodenum. - No specimens collecte    GALLBLADDER SURGERY  10/01/2024    TUBAL ABDOMINAL LIGATION      WISDOM TOOTH EXTRACTION  01/01/2021       Family History   Problem Relation Age of Onset    Colon polyps Mother         < 60 years old    Arthritis Mother     Heart disease Mother     Asthma Mother     Hypertension Mother     Stroke Father     Clotting disorder Father     Heart attack Father     Hypertension Father     Anemia Sister     No Known Problems Brother     No Known Problems Maternal Grandmother     Arrhythmia Paternal Grandmother     No Known Problems Daughter     No Known Problems Son     No Known Problems Maternal Aunt     No Known Problems Paternal Aunt     BRCA 1/2 Neg Hx     Breast cancer Neg Hx     Colon cancer Neg Hx     Endometrial cancer Neg Hx     Ovarian cancer Neg Hx        Social History     Socioeconomic History    Marital status: Single   Tobacco Use    Smoking status: Never    Smokeless tobacco: Never   Vaping Use    Vaping status: Never Used   Substance and Sexual Activity    Alcohol use: Not Currently    Drug use: Never    Sexual activity: Yes     Partners: Male      Birth control/protection: Surgical           Objective   Physical Exam  Vitals and nursing note reviewed.   Constitutional:       Appearance: She is well-developed.   HENT:      Head: Normocephalic and atraumatic.   Eyes:      Conjunctiva/sclera: Conjunctivae normal.      Pupils: Pupils are equal, round, and reactive to light.   Cardiovascular:      Rate and Rhythm: Normal rate and regular rhythm.      Heart sounds: Normal heart sounds.   Pulmonary:      Effort: Pulmonary effort is normal.      Breath sounds: Normal breath sounds.   Abdominal:      Palpations: Abdomen is soft.      Tenderness: There is no abdominal tenderness.   Musculoskeletal:         General: No deformity. Normal range of motion.      Cervical back: Normal range of motion.   Skin:     General: Skin is warm.   Neurological:      Mental Status: She is alert and oriented to person, place, and time.      Cranial Nerves: Cranial nerves 2-12 are intact.      Sensory: Sensation is intact.      Motor: Motor function is intact.      Coordination: Coordination is intact.   Psychiatric:         Behavior: Behavior normal.         Procedures           ED Course      EKG as interpreted by me: Normal sinus rhythm with a rate of 75, no acute ischemia or infarction                                       Lab Results (last 24 hours)       Procedure Component Value Units Date/Time    CBC & Differential [[id number]]  (Abnormal) Collected: 04/23/25 0744    Specimen: Blood from Arm, Right Updated: 04/23/25 0804    Narrative:      The following orders were created for panel order CBC & Differential.  Procedure                               Abnormality         Status                     ---------                               -----------         ------                     CBC Auto Differential[[id number]]        Abnormal            Final result                 Please view results for these tests on the individual orders.    Comprehensive Metabolic Panel [[id number]]   (Abnormal) Collected: 04/23/25 0744    Specimen: Blood from Arm, Right Updated: 04/23/25 0834     Glucose 100 mg/dL      BUN 7 mg/dL      Creatinine 0.69 mg/dL      Sodium 138 mmol/L      Potassium 3.4 mmol/L      Chloride 102 mmol/L      CO2 24.0 mmol/L      Calcium 9.3 mg/dL      Total Protein 6.7 g/dL      Albumin 4.2 g/dL      ALT (SGPT) 9 U/L      AST (SGOT) 12 U/L      Alkaline Phosphatase 61 U/L      Total Bilirubin 0.7 mg/dL      Globulin 2.5 gm/dL      A/G Ratio 1.7 g/dL      BUN/Creatinine Ratio 10.1     Anion Gap 12.0 mmol/L      eGFR 108.5 mL/min/1.73     Narrative:      GFR Categories in Chronic Kidney Disease (CKD)      GFR Category          GFR (mL/min/1.73)    Interpretation  G1                     90 or greater         Normal or high (1)  G2                      60-89                Mild decrease (1)  G3a                   45-59                Mild to moderate decrease  G3b                   30-44                Moderate to severe decrease  G4                    15-29                Severe decrease  G5                    14 or less           Kidney failure          (1)In the absence of evidence of kidney disease, neither GFR category G1 or G2 fulfill the criteria for CKD.    eGFR calculation 2021 CKD-EPI creatinine equation, which does not include race as a factor    High Sensitivity Troponin T [[id number]]  (Normal) Collected: 04/23/25 0744    Specimen: Blood from Arm, Right Updated: 04/23/25 0828     HS Troponin T <6 ng/L     Narrative:      High Sensitive Troponin T Reference Range:  <14.0 ng/L- Negative Female for AMI  <22.0 ng/L- Negative Male for AMI  >=14 - Abnormal Female indicating possible myocardial injury.  >=22 - Abnormal Male indicating possible myocardial injury.   Clinicians would have to utilize clinical acumen, EKG, Troponin, and serial changes to determine if it is an Acute Myocardial Infarction or myocardial injury due to an underlying chronic condition.         CBC Auto  Differential [[id number]]  (Abnormal) Collected: 04/23/25 0744    Specimen: Blood from Arm, Right Updated: 04/23/25 0804     WBC 10.11 10*3/mm3      RBC 4.14 10*6/mm3      Hemoglobin 12.1 g/dL      Hematocrit 38.2 %      MCV 92.3 fL      MCH 29.2 pg      MCHC 31.7 g/dL      RDW 12.4 %      RDW-SD 41.8 fl      MPV 10.1 fL      Platelets 400 10*3/mm3      Neutrophil % 60.5 %      Lymphocyte % 31.1 %      Monocyte % 7.3 %      Eosinophil % 0.2 %      Basophil % 0.5 %      Immature Grans % 0.4 %      Neutrophils, Absolute 6.12 10*3/mm3      Lymphocytes, Absolute 3.14 10*3/mm3      Monocytes, Absolute 0.74 10*3/mm3      Eosinophils, Absolute 0.02 10*3/mm3      Basophils, Absolute 0.05 10*3/mm3      Immature Grans, Absolute 0.04 10*3/mm3      nRBC 0.0 /100 WBC     High Sensitivity Troponin T 1Hr [[id number]] Collected: 04/23/25 0912    Specimen: Blood from Arm, Right Updated: 04/23/25 0952     HS Troponin T <6 ng/L      Troponin T Numeric Delta --     Comment: Unable to calculate.       Narrative:      High Sensitive Troponin T Reference Range:  <14.0 ng/L- Negative Female for AMI  <22.0 ng/L- Negative Male for AMI  >=14 - Abnormal Female indicating possible myocardial injury.  >=22 - Abnormal Male indicating possible myocardial injury.   Clinicians would have to utilize clinical acumen, EKG, Troponin, and serial changes to determine if it is an Acute Myocardial Infarction or myocardial injury due to an underlying chronic condition.                CT Angiogram Head   Final Result       No major intracranial vascular occlusion, stenosis, or aneurysm.           This report was signed and finalized on 4/23/2025 10:01 AM by Dr. Carlos Oreilly MD.          CT Angiogram Neck   Final Result   1. No evidence of significant steno-occlusive disease or dissection   involving the cervical portion of the carotid and vertebral arteries.   There appear to be bilateral fetal origin posterior cerebral arteries, a   normal variant.            This report was signed and finalized on 4/23/2025 10:14 AM by Dr. Avery Christie MD.          CT Head Without Contrast   Final Result       No acute intracranial findings.           This report was signed and finalized on 4/23/2025 9:55 AM by Dr. Carlos Oreilly MD.                         Medical Decision Making  Patient is a 45-year-old female with a history of hypertension who presents to the ER with syncope.  Patient states this morning she felt like she needed to stretch her neck and bent her head forward to stretch her neck.  Patient states she had a sudden onset of dizziness.  Patient states she tried to get up to walk and started walking in circles and kind of leaning to the right.  Patient states she made it to a chair and then briefly passed out.  Patient states she does feel better now and her dizziness has improved but she now has a generalized headache.  She describes it as an achy pain.  There was no thunderclap onset.  She denies any fever, chest pain, shortness of air, abdominal pain, nausea vomiting diarrhea, urinary changes, numbness or weakness, neck or back pain, extremity pain.    Differential diagnosis: Vertigo, vertebral artery dissection, CVA, intracranial hemorrhage, electrolyte abnormality    Patient was given IV fluids, meclizine, morphine and Zofran.  Labs were normal including troponin x 2.  CTA of the head and neck showed no acute findings.  CT scan of the head was unremarkable.  Patient has no signs of vertebral artery dissection.  Patient states she has a long history of vertigo and is already on meclizine at home.  Patient states she is feeling significantly better.  She was able to ambulate in the ER without difficulty.  I discussed the case with Dr. Lomas with neurology.  He did not feel like any further workup including MRI was needed at this time.  Patient will be discharged home to follow-up with her PCP and neurologist.  She is to return to the ER for any  worsening or new symptoms or other concerns.  Patient was agreeable to the plan and discharged home.      Problems Addressed:  Syncope and collapse: complicated acute illness or injury  Vertigo: complicated acute illness or injury    Amount and/or Complexity of Data Reviewed  Labs: ordered. Decision-making details documented in ED Course.  Radiology: ordered. Decision-making details documented in ED Course.  ECG/medicine tests: ordered. Decision-making details documented in ED Course.  Discussion of management or test interpretation with external provider(s): Dr. Lomas with neurology    Risk  Prescription drug management.        Final diagnoses:   Vertigo   Syncope and collapse       ED Disposition  ED Disposition       ED Disposition   Discharge    Condition   Stable    Comment   --               DARIO Nichole MD  [address] [address]  [address] [place] [address] [id number] [phone]    Schedule an appointment as soon as possible for a visit            Medication List      No changes were made to your prescriptions during this visit.            Shannan Baig MD  04/23/25 [id number]

## 2025-04-23 NOTE — TELEPHONE ENCOUNTER
PER NOTE PT IS SUPPOSED TO TAKE 1-2 TABLETS NIGHTLY. PT WILL NEED A NEW SCRIPT SENT IN STATING THAT.           Roxann Colindres called to request a refill on her medication.      Last office visit : 4/15/2025 MEGAN BECERRA  Next office visit : 5/21/2025 MEGAN BECERRA    Requested Prescriptions     Pending Prescriptions Disp Refills    traZODone (DESYREL) 50 MG tablet 60 tablet 2     Sig: Take 1 tablet by mouth nightly as needed for Sleep            Meg Benz       4/14/2025 3:57 PM                             Progress Note              Roxann Colindres 1980                                 Chief Complaint   Patient presents with    Follow-up    Medication Check            Subjective:    Patient is a 45 y.o. female with history of  HTN, depression, fibromyalgia, anxiety, headaches presents for follow up. Last seen in clinic or telehealth on 3/27/2025 and prior records were reviewed. Lamictal increased last visit. Trazodone started for sleep, Cymbalta restarted.      Seen in office, she is bright, calm, and cooperative. Has been doing yard work, trying to stay active and exercise more, has weight bench and elliptical at home.   Feels her mood has improved with medication changes, less tearful, feels more stable. Some lingering anxiety, would like to increase dose of Cymbalta. Currently taking 75mg of Lamictal. Reports compliance with medications, sleep is improving with Trazodone, taking 50mg. Denies SI/HI or AV hallucinations.         Absent suicidal ideations.   Reports medication compliance as good.      Sleep: improving, some nights waking up early and unable to return to sleep. Snores. Lots of daytime fatigue. Sleep study pending.      Appetite: ok      Caffeine: one hot tea a day      Previous medication trials:   Cymbalta  Gabapentin  Lamictal  Wellbutrin (on edge)  Prozac  Lexapro  Celexa  Trazodone     Mental health contact: Lost to follow-up      SUBSTANCE USE HISTORY  Alcohol: Denies   illicit

## 2025-04-24 LAB
QT INTERVAL: 400 MS
QTC INTERVAL: 446 MS

## 2025-04-25 ENCOUNTER — OFFICE VISIT (OUTPATIENT)
Dept: INTERNAL MEDICINE | Facility: CLINIC | Age: 45
End: 2025-04-25
Payer: MEDICAID

## 2025-04-25 ENCOUNTER — HOSPITAL ENCOUNTER (OUTPATIENT)
Dept: SLEEP CENTER | Age: 45
Discharge: HOME | End: 2025-04-27
Payer: MEDICAID

## 2025-04-25 VITALS
DIASTOLIC BLOOD PRESSURE: 80 MMHG | OXYGEN SATURATION: 98 % | BODY MASS INDEX: 29.25 KG/M2 | SYSTOLIC BLOOD PRESSURE: 128 MMHG | TEMPERATURE: 97.6 F | HEIGHT: 66 IN | WEIGHT: 182 LBS | HEART RATE: 69 BPM

## 2025-04-25 DIAGNOSIS — R42 DIZZINESS: Primary | ICD-10-CM

## 2025-04-25 DIAGNOSIS — H93.13 TINNITUS OF BOTH EARS: ICD-10-CM

## 2025-04-25 DIAGNOSIS — H91.90 HEARING LOSS, UNSPECIFIED HEARING LOSS TYPE, UNSPECIFIED LATERALITY: ICD-10-CM

## 2025-04-25 PROCEDURE — 1125F AMNT PAIN NOTED PAIN PRSNT: CPT | Performed by: INTERNAL MEDICINE

## 2025-04-25 PROCEDURE — G0399 HOME SLEEP TEST/TYPE 3 PORTA: HCPCS

## 2025-04-25 PROCEDURE — 99214 OFFICE O/P EST MOD 30 MIN: CPT | Performed by: INTERNAL MEDICINE

## 2025-04-25 NOTE — PROGRESS NOTES
Chief Complaint   Patient presents with    Follow-up    Dizziness         History:  Zena Zheng is a 45 y.o. female who presents today for evaluation of the above problems.      HPI  History of Present Illness    Zena presents today for ER follow-up for dizziness.  According to the emergency room note that on April 23, 2025 she felt like she needed to stretch her neck, bit her head forward to stretch the neck and then had a sudden onset of dizziness.  When she started walking she was leaning to the right.  She lost consciousness for short period of time after making it to a chair.  By the time she mated to the emergency room she felt better, dizziness improved but she had a generalized headache.    She had complete workup.  Troponins were negative x 2.  Potassium slightly low at 3.4.  She had a CT head, CTA of the head and neck all of which were reviewed today and were unremarkable.    Of note, she had an episode of vertigo on March 19, 2025 prompting CT head which was also unremarkable.    Referred to neurology on 4/4/2025    Persistent dizziness is reported, which has not improved since the emergency room visit. Symptoms include a sensation of being off-centered, occasionally feeling as though walking sideways, and episodes of blackout. These symptoms have progressively worsened over time. Additional symptoms include fatigue, tinnitus, and a numbing sensation that ascends the body, culminating in facial numbness and tingling. These episodes often result in temporary blindness and fainting due to exhaustion. A tingling sensation in the back of the head is noted during physical exertion.     The patient has experienced these symptoms since childhood, though they were initially infrequent and unconnected.    An upcoming appointment with neurology is scheduled for 06/30/2025. Hydration level is maintained at approximately 88 ounces per day. Coffee exacerbates nausea. Visual disturbances, hearing loss, and  tinnitus are reported, with sounds sometimes perceived as if in a tunnel. Occasional unilateral hearing loss is also noted. Excessive sweating occurs at night and during these episodes, which can last all day. No history of migraines is reported. Daily meclizine provides some relief, and trazodone aids in sleep and stress reduction, thereby preventing the tingling sensation.    ROS:  Review of Systems   Constitutional:  Positive for activity change.   Neurological:  Positive for dizziness, syncope, weakness and numbness.         Current Outpatient Medications:     acetaminophen (Tylenol) 325 MG tablet, Take 3 tablets by mouth Every 8 (Eight) Hours. Take every 8 hours for 3 days then take prn as needed., Disp: , Rfl:     Azelastine HCl 137 MCG/SPRAY solution, SPRAY 1 TO 2 SPRAYS INTO EACH NOSTRIL TWICE A DAY AS NEEDED, Disp: , Rfl:     diclofenac (VOLTAREN) 75 MG EC tablet, Take 1 tablet by mouth Every 12 (Twelve) Hours., Disp: , Rfl:     DULoxetine (CYMBALTA) 30 MG capsule, Take 1 capsule by mouth Every Morning., Disp: , Rfl:     fluticasone (FLONASE) 50 MCG/ACT nasal spray, 1 spray by Each Nare route Daily., Disp: 15.8 mL, Rfl: 11    gabapentin (NEURONTIN) 300 MG capsule, Take 1 capsule by mouth 2 (Two) Times a Day., Disp: 60 capsule, Rfl: 4    lamoTRIgine (LaMICtal) 25 MG tablet, 2 TABLETS NIGHTLY, Disp: , Rfl:     loratadine (Claritin) 10 MG tablet, Take 1 tablet by mouth Daily., Disp: , Rfl:     meclizine (ANTIVERT) 25 MG tablet, Take 1 tablet by mouth 3 (Three) Times a Day As Needed for Dizziness., Disp: 60 tablet, Rfl: 1    Metoprolol Succinate 25 MG capsule extended-release 24 hour sprinkle, Metoprolol Succinate, Disp: , Rfl:     mometasone (ELOCON) 0.1 % solution, , Disp: , Rfl:     tiZANidine (ZANAFLEX) 4 MG tablet, Take 1 tablet by mouth 2 (Two) Times a Day., Disp: 60 tablet, Rfl: 3    traZODone (DESYREL) 50 MG tablet, Take 1 tablet by mouth Every Night., Disp: , Rfl:     Lab Results   Component Value Date     GLUCOSE 100 (H) 04/23/2025    BUN 7 04/23/2025    CREATININE 0.69 04/23/2025     04/23/2025    K 3.4 (L) 04/23/2025     04/23/2025    CALCIUM 9.3 04/23/2025    PROTEINTOT 6.7 04/23/2025    ALBUMIN 4.2 04/23/2025    ALT 9 04/23/2025    AST 12 04/23/2025    ALKPHOS 61 04/23/2025    BILITOT 0.7 04/23/2025    GLOB 2.5 04/23/2025    AGRATIO 1.7 04/23/2025    BCR 10.1 04/23/2025    ANIONGAP 12.0 04/23/2025    EGFR 108.5 04/23/2025       WBC   Date Value Ref Range Status   04/23/2025 10.11 3.40 - 10.80 10*3/mm3 Final   02/21/2023 10.4 4.8 - 10.8 K/uL Final     RBC   Date Value Ref Range Status   04/23/2025 4.14 3.77 - 5.28 10*6/mm3 Final   02/21/2023 4.47 4.20 - 5.40 M/uL Final     Hemoglobin   Date Value Ref Range Status   04/23/2025 12.1 12.0 - 15.9 g/dL Final   02/21/2023 13.5 12.0 - 16.0 g/dL Final     Hematocrit   Date Value Ref Range Status   04/23/2025 38.2 34.0 - 46.6 % Final   02/21/2023 41.9 37.0 - 47.0 % Final     MCV   Date Value Ref Range Status   04/23/2025 92.3 79.0 - 97.0 fL Final   02/21/2023 93.7 81.0 - 99.0 fL Final     MCH   Date Value Ref Range Status   04/23/2025 29.2 26.6 - 33.0 pg Final   02/21/2023 30.2 27.0 - 31.0 pg Final     MCHC   Date Value Ref Range Status   04/23/2025 31.7 31.5 - 35.7 g/dL Final   02/21/2023 32.2 (L) 33.0 - 37.0 g/dL Final     RDW   Date Value Ref Range Status   04/23/2025 12.4 12.3 - 15.4 % Final   02/21/2023 13.2 11.5 - 14.5 % Final     RDW-SD   Date Value Ref Range Status   04/23/2025 41.8 37.0 - 54.0 fl Final     MPV   Date Value Ref Range Status   04/23/2025 10.1 6.0 - 12.0 fL Final   02/21/2023 10.4 9.4 - 12.3 fL Final     Platelets   Date Value Ref Range Status   04/23/2025 400 140 - 450 10*3/mm3 Final   02/21/2023 331 130 - 400 K/uL Final     Neutrophil Rel %   Date Value Ref Range Status   02/21/2023 67.7 (H) 50.0 - 65.0 % Final     Neutrophil %   Date Value Ref Range Status   04/23/2025 60.5 42.7 - 76.0 % Final     Lymphocyte Rel %   Date Value Ref  Range Status   02/21/2023 26.2 20.0 - 40.0 % Final     Lymphocyte %   Date Value Ref Range Status   04/23/2025 31.1 19.6 - 45.3 % Final     Monocyte Rel %   Date Value Ref Range Status   02/21/2023 5.4 0.0 - 10.0 % Final     Monocyte %   Date Value Ref Range Status   04/23/2025 7.3 5.0 - 12.0 % Final     Eosinophil Rel %   Date Value Ref Range Status   02/21/2023 0.1 0.0 - 5.0 % Final     Eosinophil %   Date Value Ref Range Status   04/23/2025 0.2 (L) 0.3 - 6.2 % Final     Basophil Rel %   Date Value Ref Range Status   02/21/2023 0.4 0.0 - 1.0 % Final     Basophil %   Date Value Ref Range Status   04/23/2025 0.5 0.0 - 1.5 % Final     Immature Grans %   Date Value Ref Range Status   04/23/2025 0.4 0.0 - 0.5 % Final     Neutrophils Absolute   Date Value Ref Range Status   02/21/2023 7.0 1.5 - 7.5 K/uL Final     Neutrophils, Absolute   Date Value Ref Range Status   04/23/2025 6.12 1.70 - 7.00 10*3/mm3 Final     Lymphocytes Absolute   Date Value Ref Range Status   02/21/2023 2.7 1.1 - 4.5 K/uL Final     Lymphocytes, Absolute   Date Value Ref Range Status   04/23/2025 3.14 (H) 0.70 - 3.10 10*3/mm3 Final     Monocytes Absolute   Date Value Ref Range Status   02/21/2023 0.60 0.00 - 0.90 K/uL Final     Monocytes, Absolute   Date Value Ref Range Status   04/23/2025 0.74 0.10 - 0.90 10*3/mm3 Final     Eosinophils Absolute   Date Value Ref Range Status   02/21/2023 0.00 0.00 - 0.60 K/uL Final     Eosinophils, Absolute   Date Value Ref Range Status   04/23/2025 0.02 0.00 - 0.40 10*3/mm3 Final     Basophils Absolute   Date Value Ref Range Status   02/21/2023 0.00 0.00 - 0.20 K/uL Final     Basophils, Absolute   Date Value Ref Range Status   04/23/2025 0.05 0.00 - 0.20 10*3/mm3 Final     Immature Grans, Absolute   Date Value Ref Range Status   04/23/2025 0.04 0.00 - 0.05 10*3/mm3 Final   02/21/2023 0.0 K/uL Final     nRBC   Date Value Ref Range Status   04/23/2025 0.0 0.0 - 0.2 /100 WBC Final         OBJECTIVE:  Visit Vitals  BP  "128/80 (BP Location: Left arm, Patient Position: Sitting, Cuff Size: Adult)   Pulse 69   Temp 97.6 °F (36.4 °C) (Temporal)   Ht 167.6 cm (66\")   Wt 82.6 kg (182 lb)   SpO2 98%   BMI 29.38 kg/m²      Physical Exam  Vitals and nursing note reviewed.   Constitutional:       General: She is not in acute distress.     Appearance: Normal appearance. She is well-developed. She is not diaphoretic.   HENT:      Head: Normocephalic and atraumatic.      Right Ear: Tympanic membrane, ear canal and external ear normal. There is no impacted cerumen.      Left Ear: Tympanic membrane, ear canal and external ear normal. There is no impacted cerumen.   Eyes:      Pupils: Pupils are equal, round, and reactive to light.   Neck:      Thyroid: No thyromegaly.      Vascular: No carotid bruit.      Trachea: Phonation normal.   Cardiovascular:      Rate and Rhythm: Normal rate and regular rhythm.      Heart sounds: No murmur heard.  Pulmonary:      Effort: No respiratory distress.      Breath sounds: No wheezing or rales.   Skin:     Coloration: Skin is not pale.      Findings: No erythema.   Neurological:      Mental Status: She is alert.   Psychiatric:         Behavior: Behavior normal.         Thought Content: Thought content normal.         Judgment: Judgment normal.         Results  Imaging    Diagnostic Testing   - Stress test: 12/2024, Normal   - Heart monitor: 12/2024, Normal    Assessment/Plan      Diagnoses and all orders for this visit:    1. Dizziness (Primary)  -     MRI internal auditory canal w wo; Future  -     Ambulatory Referral to ENT (Otolaryngology)    2. Tinnitus of both ears  -     MRI internal auditory canal w wo; Future  -     Ambulatory Referral to ENT (Otolaryngology)    3. Hearing loss, unspecified hearing loss type, unspecified laterality  -     MRI internal auditory canal w wo; Future  -     Ambulatory Referral to ENT (Otolaryngology)      Assessment & Plan  1. Dizziness.  - Symptoms include persistent " dizziness, feeling off-centered, occasional blackouts, and ringing in the ears.  - Physical exam findings include no abnormalities in the right ear; stress test and heart monitor results from 12/2024 were normal.  - Discussed potential causes such as Ménière's disease and occipital migraines; reviewed the possibility of central nervous system vertigo.  - An MRI of the internal auditory canal will be ordered; referral to an ENT specialist for further evaluation; advised to continue taking meclizine for symptom relief.  - Advised to keep her consultation with neurology as scheduled in June 2025.      Return for Next scheduled follow up or sooner if needed.      GILLIAN Nichole MD  15:23 CDT  4/25/2025   Electronically signed    Patient or patient representative verbalized consent for the use of Ambient Listening during the visit with  DARIO Nichole MD for chart documentation. 4/25/2025  16:09 CDT

## 2025-04-27 PROCEDURE — G0399 HOME SLEEP TEST/TYPE 3 PORTA: HCPCS

## 2025-04-28 ENCOUNTER — HOSPITAL ENCOUNTER (OUTPATIENT)
Dept: MRI IMAGING | Age: 45
Discharge: HOME | End: 2025-04-28
Payer: MEDICAID

## 2025-04-28 DIAGNOSIS — S46.912D STRAIN OF LEFT SHOULDER, SUBSEQUENT ENCOUNTER: ICD-10-CM

## 2025-04-28 PROCEDURE — 73221 MRI JOINT UPR EXTREM W/O DYE: CPT

## 2025-04-29 NOTE — PROGRESS NOTES
[facility]  [address] [place] [address]  Phone [phone] Fax [phone]       Patient Name Roxann Colindres Account Number [id number]    1980 Referring Provider HERNAN Quinonez   Age/ Gender 45 years/F Interpreting physician Yessi Gilmore M.D., FCCP, DABSM   Neck circumference Unavailable Device Yoly NightOne   Mallampati classification Unavailable Scoring Technician Jayde Dinero, CRT, RPSGT   Ames score  Indications for the test excessive daytime somnolence   Height 66.0 inches Test Home Sleep Apnea Test   Weight 179.0 lbs Date of test 2025   BMI 28.9 Time in Bed (TIB) 645.8 minutes     Events   Index (#/hr) Total # Events Mean Duration (sec) Max Duration (sec) Events by Position        Supine        # Index   Central Apneas 0.0 0 0.0 0.0 0 0.0   Obstructive Apneas 1.7 18 12.2 15.0 12 2.2   Mixed Apneas 0.0 0 0.0 0.0 0 0.0   Hypopneas 3.2 34 21.3 98.0 24 4.3   Estimated AHI  #events/TIB (hrs) 4.8 52 18.2 98.0 6.5   Time in Position (minutes) 332.7     Snoring  TST with snoring 291.4   % of TST with snoring 45.1     Oximetry distribution  <90 %  (minutes) 80.8   <85 %  (minutes) 0.0   <80 %  (minutes) 0.0   <75 %  (minutes) 0.0   <70 %  (minutes) 0.0   <60 %  (minutes) 0.0   <50 %  (minutes) 0.0   Average (%) 92   Desat Index (#/hour) 5.0   Desat Max (%) 7   Desat Max dur (sec) 110.5   Lowest SpO2 (³ 2 sec) (%) 85         Heart Rate  Mean HR (BPM) 61.1   # of LHR 19   LHR min (BPM) 53   # of HHR 0   HHR max (BPM) 89           Interpretation:     No evidence of sleep related breathing disorder.     Recommendations:    Consider in lab sleep study if sleep apnea remains suspect.   Avoid risky activity such as driving if sleepy.   Discuss sleep hygiene with the patient.        Yessi Gilmore MD, FCCP, [facility]

## 2025-04-29 NOTE — PROGRESS NOTES
[facility]  [address] [place] [address]  Phone [phone] Fax [phone]       Patient Name Roxann Colindres Account Number [id number]    1980 Referring Provider HERNAN Quinonez   Age/ Gender 45 years/F Interpreting physician Yessi Gilmore M.D., FCCP, DABSM   Neck circumference Unavailable Device Yoly NightOne   Mallampati classification Unavailable Scoring Technician Jayde Dinero, CRT, RPSGT   [place] score  Indications for the test excessive daytime somnolence   Height 66.0 inches Test Home Sleep Apnea Test   Weight 179.0 lbs Date of test 2025   BMI 28.9 Time in Bed (TIB) 457.9 minutes     Events   Index (#/hr) Total # Events Mean Duration (sec) Max Duration (sec) Events by Position        Supine        # Index   Central Apneas 0.0 0 0.0 0.0 0 0.0   Obstructive Apneas 0.8 6 12.6 19.5 3 1.1   Mixed Apneas 0.0 0 0.0 0.0 0 0.0   Hypopneas 2.8 21 13.5 21.5 15 5.3   Estimated AHI  #events/TIB (hrs) 3.5 27 13.3 21.5 6.4   Time in Position (minutes) 169.1     Snoring  TST with snoring 161.9   % of TST with snoring 35.4     Oximetry distribution  <90 %  (minutes) 4.2   <85 %  (minutes) 0.3   <80 %  (minutes) 0.3   <75 %  (minutes) 0.3   <70 %  (minutes) 0.3   <60 %  (minutes) 0.3   <50 %  (minutes) 0.3   Average (%) 93   Desat Index (#/hour) 4.3   Desat Max (%) 8   Desat Max dur (sec) 88.5   Lowest SpO2 (³ 2 sec) (%) 87         Heart Rate  Mean HR (BPM) 54.0   # of LHR 10   LHR min (BPM) 46   # of HHR 0   HHR max (BPM) 82                  Interpretation:      No evidence of sleep related breathing disorder.      Recommendations:     Consider in lab sleep study if sleep apnea remains suspect.   Avoid risky activity such as driving if sleepy.   Discuss sleep hygiene with the patient.         Yessi Gilmore MD, [facility], [facility]

## 2025-05-01 ENCOUNTER — TELEPHONE (OUTPATIENT)
Age: 45
End: 2025-05-01

## 2025-05-01 NOTE — PROGRESS NOTES
Orthopaedic Clinic Note - Established Patient    NAME:  Roxann Colindres   : 1980  MRN: [id number]    2025    CHIEF COMPLAINT:  follow up for left shoulder with MRI results    HISTORY OF PRESENT ILLNESS:   The patient is a 45 y.o. female who returns today for follow up of traumatic left shoulder since November.  Treatment has consisted of oral anti-inflammatories and corticosteroid injection. Since last visit, pain and limited range of motion have remained. She returns to clinic today to go over MRI results.    Past Medical History:        Diagnosis Date    Abnormal Pap smear of cervix     Allergic     Anemia     with pregnancy    ASCUS favor benign 10/2015    Depression     Fibromyalgia     Recurrent miscarriages        Past Surgical History:        Procedure Laterality Date    ABDOMEN SURGERY  10-1-24    ANKLE SURGERY Left     aprox in     DENTAL SURGERY      Tooth Implant    DILATION AND CURETTAGE OF UTERUS      LEEP N/A 2020    LEEP performed by Chica [facility]eland MD at [facility] OR    SALPINGECTOMY Bilateral     Dr. Burrell       Current Medications:   Prior to Admission medications    Medication Sig Start Date End Date Taking? Authorizing Provider   meclizine (ANTIVERT) 25 MG tablet TAKE 1 TABLET BY MOUTH 3 TIMES A DAY AS NEEDED FOR DIZZINESS. 3/19/25  Yes Maurice Toure MD   traZODone (DESYREL) 50 MG tablet Take 1 tablet by mouth nightly as needed for Sleep 25 Yes Li Correia MD   DULoxetine 40 MG CPEP Take 40 mg by mouth every morning 25  Yes Srinath Linn APRN - CNP   lamoTRIgine (LAMICTAL) 100 MG tablet Take 1 tablet by mouth daily 25 Yes Srinath Linn APRN - CNP   diclofenac (VOLTAREN) 75 MG EC tablet Take 1 tablet by mouth 2 times daily 25  Yes Luis Tidwell PA   fluticasone (FLONASE) 50 MCG/ACT nasal spray  24  Yes Maurice Toure MD   NONFORMULARY Pt states that there are 2 more meds she takes name unknown for acne

## 2025-05-01 NOTE — TELEPHONE ENCOUNTER
Called and let patient know the letter has been written. She states she will get it off of her mychart and get it to her employer.

## 2025-05-01 NOTE — TELEPHONE ENCOUNTER
Called patient to address her questions.  Patient states that she is currently experiencing burning in her left shoulder and she does not feel as if the injection she received at her last OV helped at all.  She states that her job requires a lot of lifting and her employer states that she can't leave work without a dr. Note excusing her. She also wanted to see if she could possibly get FMLA. I informed patient that I am unsure if our office could provide a work note for today and tomorrow to allow her to leave work but I woul ask the provider. I also informed patient that she would need to come in for an office visit to discuss FMLA and provide our office with the paperwork. Patient is scheduled with Maked[facility] for MRI results of her shoulder next Wednesday May 7th.     Will route this to Makeda to address.

## 2025-05-05 DIAGNOSIS — G47.10 HYPERSOMNIA: ICD-10-CM

## 2025-05-06 ENCOUNTER — FOLLOWUP TELEPHONE ENCOUNTER (OUTPATIENT)
Dept: SLEEP CENTER | Age: 45
End: 2025-05-06

## 2025-05-06 NOTE — TELEPHONE ENCOUNTER
Called and spoke to patient and discussed the results of the home sleep study.  Sleep study report was sent to the referring provider.

## 2025-05-07 ENCOUNTER — OFFICE VISIT (OUTPATIENT)
Dept: OBGYN CLINIC | Age: 45
End: 2025-05-07
Payer: MEDICAID

## 2025-05-07 ENCOUNTER — HOSPITAL ENCOUNTER (OUTPATIENT)
Dept: ULTRASOUND IMAGING | Age: 45
Discharge: HOME | End: 2025-05-07
Payer: MEDICAID

## 2025-05-07 ENCOUNTER — OFFICE VISIT (OUTPATIENT)
Age: 45
End: 2025-05-07
Payer: MEDICAID

## 2025-05-07 VITALS
HEART RATE: 90 BPM | DIASTOLIC BLOOD PRESSURE: 80 MMHG | BODY MASS INDEX: 29.05 KG/M2 | SYSTOLIC BLOOD PRESSURE: 126 MMHG | WEIGHT: 180 LBS

## 2025-05-07 VITALS — WEIGHT: 180 LBS | BODY MASS INDEX: 28.93 KG/M2 | HEIGHT: 66 IN

## 2025-05-07 DIAGNOSIS — N93.8 DUB (DYSFUNCTIONAL UTERINE BLEEDING): ICD-10-CM

## 2025-05-07 DIAGNOSIS — R10.32 LEFT LOWER QUADRANT ABDOMINAL PAIN: ICD-10-CM

## 2025-05-07 DIAGNOSIS — S46.912D STRAIN OF LEFT SHOULDER, SUBSEQUENT ENCOUNTER: ICD-10-CM

## 2025-05-07 DIAGNOSIS — N93.8 DUB (DYSFUNCTIONAL UTERINE BLEEDING): Primary | ICD-10-CM

## 2025-05-07 DIAGNOSIS — M75.02 ADHESIVE CAPSULITIS OF LEFT SHOULDER: Primary | ICD-10-CM

## 2025-05-07 DIAGNOSIS — N95.1 PERIMENOPAUSAL SYMPTOMS: ICD-10-CM

## 2025-05-07 PROCEDURE — 99213 OFFICE O/P EST LOW 20 MIN: CPT

## 2025-05-07 PROCEDURE — 76830 TRANSVAGINAL US NON-OB: CPT

## 2025-05-07 PROCEDURE — 99213 OFFICE O/P EST LOW 20 MIN: CPT | Performed by: NURSE PRACTITIONER

## 2025-05-07 RX ORDER — MECLIZINE HYDROCHLORIDE 25 MG/1
TABLET ORAL
COMMUNITY
Start: 2025-03-19

## 2025-05-07 NOTE — PROGRESS NOTES
Pt is here for DUB she started her period 4/15/2025 and is still currently bleeding, it is heavy with clotting, also mentions a constant pain on the left lower quadrant, she has went 7 days bleeding but never this long. States her mother has fibroids, younger sister has endometriosis, and older sister has cervical cancer.

## 2025-05-07 NOTE — PROGRESS NOTES
[facility]  CNM Office Note    Roxann Colindres is a 45 y.o. female who presents today for her medical conditions/ complaints as noted below.  Chief Complaint   Patient presents with    Irregular Menses       Roxann presents to the office to discuss irregular bleeding. Pt started bleeding on 04/15/25 and has had continuous bleeding since then.Pt mentions she has also been having some mental disturbances. She wakes up with visual disturbances and will either see a person or a light. Pt also mentions having bad dreams. Pt doesn't know if these factors are related to hormones or if they could play a role in her bleeding patterns.  Pt has had a couple changes in her daily medications also.      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      Past Medical History:   Diagnosis Date    Abnormal Pap smear of cervix     Allergic     Anemia     with pregnancy    ASCUS favor benign 10/2015    Depression     Fibromyalgia     Recurrent miscarriages      Past Surgical History:   Procedure Laterality Date    ABDOMEN SURGERY  10-1-24    ANKLE SURGERY Left     aprox in     DENTAL SURGERY      Tooth Implant    DILATION AND CURETTAGE OF UTERUS      LEEP N/A 2020    LEEP performed by Chica Copeland MD at [facility] OR    SALPINGECTOMY Bilateral     Dr. Burrell     Family History   Problem Relation Age of Onset    Diabetes Paternal Grandmother     Stroke Father     Osteoporosis Mother     Diabetes Maternal Grandmother     Osteoporosis Maternal Grandmother      Social History     Tobacco Use    Smoking status: Never    Smokeless tobacco: Never   Substance Use Topics    Alcohol use: Not Currently     Comment: socially       Current Outpatient Medications   Medication Sig Dispense Refill

## 2025-05-09 ENCOUNTER — TELEPHONE (OUTPATIENT)
Age: 45
End: 2025-05-09

## 2025-05-09 NOTE — TELEPHONE ENCOUNTER
Called patient. She states her work called her and said she needed a note stating she is out of work. I explained to the patient that the letter for work that was written by [employer] on 05/07/2025 states patient is to remain out of work until re-evaluated in 6 weeks. She said she will reach back out to her work them as she's not sure what else they are wanting if the letter states that.

## 2025-05-09 NOTE — TELEPHONE ENCOUNTER
Pt called and said that she needs a work note that says she's off work not on light duty due to work

## 2025-05-12 DIAGNOSIS — R42 VERTIGO: ICD-10-CM

## 2025-05-12 DIAGNOSIS — Z76.0 MEDICATION REFILL: ICD-10-CM

## 2025-05-12 RX ORDER — MECLIZINE HYDROCHLORIDE 25 MG/1
25 TABLET ORAL 3 TIMES DAILY PRN
Qty: 60 TABLET | Refills: 1 | Status: SHIPPED | OUTPATIENT
Start: 2025-05-12

## 2025-05-12 NOTE — TELEPHONE ENCOUNTER
Rx Refill Note  Requested Prescriptions     Pending Prescriptions Disp Refills    meclizine (ANTIVERT) 25 MG tablet 60 tablet 1     Sig: Take 1 tablet by mouth 3 (Three) Times a Day As Needed for Dizziness.      Last office visit with prescribing clinician: 4/25/2025   Last telemedicine visit with prescribing clinician: Visit date not found   Next office visit with prescribing clinician: 6/20/2025                         Would you like a call back once the refill request has been completed: [] Yes [] No    If the office needs to give you a call back, can they leave a voicemail: [] Yes [] No    Carlos Souza MA  05/12/25, 16:10 CDT

## 2025-05-12 NOTE — TELEPHONE ENCOUNTER
Caller: Zena Zheng    Relationship: Self    Best call back number: [phone]    Requested Prescriptions:   Requested Prescriptions     Pending Prescriptions Disp Refills    meclizine (ANTIVERT) 25 MG tablet 60 tablet 1     Sig: Take 1 tablet by mouth 3 (Three) Times a Day As Needed for Dizziness.        Pharmacy where request should be sent: [facility] #[id number] - [place], [place] - [address] AT ACROSS FROM [phone] [facility] [phone]      Last office visit with prescribing clinician: 4/25/2025   Last telemedicine visit with prescribing clinician: Visit date not found   Next office visit with prescribing clinician: 6/20/2025    Does the patient have less than a 3 day supply:  [x] Yes  [] No    Would you like a call back once the refill request has been completed: [x] Yes [] No    If the office needs to give you a call back, can they leave a voicemail: [] Yes [] No    Raúl Lindquist Rep   05/12/25 15:32 CDT

## 2025-05-15 ENCOUNTER — HOSPITAL ENCOUNTER (OUTPATIENT)
Dept: MRI IMAGING | Facility: HOSPITAL | Age: 45
Discharge: HOME | End: 2025-05-15
Admitting: INTERNAL MEDICINE
Payer: MEDICAID

## 2025-05-15 DIAGNOSIS — H91.90 HEARING LOSS, UNSPECIFIED HEARING LOSS TYPE, UNSPECIFIED LATERALITY: ICD-10-CM

## 2025-05-15 DIAGNOSIS — R42 DIZZINESS: ICD-10-CM

## 2025-05-15 DIAGNOSIS — H93.13 TINNITUS OF BOTH EARS: ICD-10-CM

## 2025-05-15 PROCEDURE — 70553 MRI BRAIN STEM W/O & W/DYE: CPT

## 2025-05-15 PROCEDURE — A9579 GAD-BASE MR CONTRAST NOS,1ML: HCPCS | Performed by: INTERNAL MEDICINE

## 2025-05-15 PROCEDURE — 25510000001 GADOPICLENOL 0.5 MMOL/ML SOLUTION: Performed by: INTERNAL MEDICINE

## 2025-05-15 RX ADMIN — GADOPICLENOL 8 ML: 485.1 INJECTION INTRAVENOUS at 09:42

## 2025-05-19 ENCOUNTER — HOSPITAL ENCOUNTER (OUTPATIENT)
Dept: WOMENS IMAGING | Age: 45
Discharge: HOME | End: 2025-05-19

## 2025-05-19 DIAGNOSIS — Z12.31 BREAST CANCER SCREENING BY MAMMOGRAM: ICD-10-CM

## 2025-05-20 ENCOUNTER — TELEPHONE (OUTPATIENT)
Dept: PSYCHIATRY | Age: 45
End: 2025-05-20

## 2025-05-20 DIAGNOSIS — M79.7 FIBROMYALGIA: ICD-10-CM

## 2025-05-20 NOTE — TELEPHONE ENCOUNTER
Rx Refill Note  Requested Prescriptions     Pending Prescriptions Disp Refills    tiZANidine (ZANAFLEX) 4 MG tablet [Pharmacy Med Name: TIZANIDINE HCL 4 MG TABLET] 60 tablet 3     Sig: TAKE 1 TABLET BY MOUTH TWICE A DAY      Last office visit with prescribing clinician: 4/4/2025   Last telemedicine visit with prescribing clinician: Visit date not found   Next office visit with prescribing clinician: Visit date not found                         Would you like a call back once the refill request has been completed: [] Yes [] No    If the office needs to give you a call back, can they leave a voicemail: [] Yes [] No    Carlos Souza MA  05/20/25, 08:45 CDT

## 2025-05-21 ENCOUNTER — OFFICE VISIT (OUTPATIENT)
Dept: PSYCHIATRY | Age: 45
End: 2025-05-21

## 2025-05-21 ENCOUNTER — RESULTS FOLLOW-UP (OUTPATIENT)
Dept: OBGYN CLINIC | Age: 45
End: 2025-05-21

## 2025-05-21 ENCOUNTER — PROCEDURE VISIT (OUTPATIENT)
Dept: OBGYN CLINIC | Age: 45
End: 2025-05-21

## 2025-05-21 VITALS
HEART RATE: 75 BPM | DIASTOLIC BLOOD PRESSURE: 79 MMHG | BODY MASS INDEX: 28.89 KG/M2 | SYSTOLIC BLOOD PRESSURE: 132 MMHG | WEIGHT: 179 LBS

## 2025-05-21 DIAGNOSIS — F41.1 GENERALIZED ANXIETY DISORDER: ICD-10-CM

## 2025-05-21 DIAGNOSIS — F43.10 POST TRAUMATIC STRESS DISORDER (PTSD): ICD-10-CM

## 2025-05-21 DIAGNOSIS — F33.0 MILD RECURRENT MAJOR DEPRESSION: Primary | ICD-10-CM

## 2025-05-21 DIAGNOSIS — N93.8 DUB (DYSFUNCTIONAL UTERINE BLEEDING): Primary | ICD-10-CM

## 2025-05-21 LAB
CONTROL: NORMAL
PREGNANCY TEST URINE, POC: NORMAL

## 2025-05-21 ASSESSMENT — ANXIETY QUESTIONNAIRES
5. BEING SO RESTLESS THAT IT IS HARD TO SIT STILL: MORE THAN HALF THE DAYS
2. NOT BEING ABLE TO STOP OR CONTROL WORRYING: NEARLY EVERY DAY
IF YOU CHECKED OFF ANY PROBLEMS ON THIS QUESTIONNAIRE, HOW DIFFICULT HAVE THESE PROBLEMS MADE IT FOR YOU TO DO YOUR WORK, TAKE CARE OF THINGS AT HOME, OR GET ALONG WITH OTHER PEOPLE: VERY DIFFICULT
5. BEING SO RESTLESS THAT IT IS HARD TO SIT STILL: MORE THAN HALF THE DAYS
2. NOT BEING ABLE TO STOP OR CONTROL WORRYING: NEARLY EVERY DAY
6. BECOMING EASILY ANNOYED OR IRRITABLE: SEVERAL DAYS
IF YOU CHECKED OFF ANY PROBLEMS ON THIS QUESTIONNAIRE, HOW DIFFICULT HAVE THESE PROBLEMS MADE IT FOR YOU TO DO YOUR WORK, TAKE CARE OF THINGS AT HOME, OR GET ALONG WITH OTHER PEOPLE: VERY DIFFICULT
6. BECOMING EASILY ANNOYED OR IRRITABLE: SEVERAL DAYS
7. FEELING AFRAID AS IF SOMETHING AWFUL MIGHT HAPPEN: MORE THAN HALF THE DAYS
4. TROUBLE RELAXING: MORE THAN HALF THE DAYS
4. TROUBLE RELAXING: MORE THAN HALF THE DAYS
1. FEELING NERVOUS, ANXIOUS, OR ON EDGE: SEVERAL DAYS
3. WORRYING TOO MUCH ABOUT DIFFERENT THINGS: MORE THAN HALF THE DAYS
3. WORRYING TOO MUCH ABOUT DIFFERENT THINGS: MORE THAN HALF THE DAYS
1. FEELING NERVOUS, ANXIOUS, OR ON EDGE: SEVERAL DAYS
GAD7 TOTAL SCORE: 13
7. FEELING AFRAID AS IF SOMETHING AWFUL MIGHT HAPPEN: MORE THAN HALF THE DAYS

## 2025-05-21 ASSESSMENT — PATIENT HEALTH QUESTIONNAIRE - PHQ9
6. FEELING BAD ABOUT YOURSELF - OR THAT YOU ARE A FAILURE OR HAVE LET YOURSELF OR YOUR FAMILY DOWN: MORE THAN HALF THE DAYS
3. TROUBLE FALLING OR STAYING ASLEEP: SEVERAL DAYS
8. MOVING OR SPEAKING SO SLOWLY THAT OTHER PEOPLE COULD HAVE NOTICED. OR THE OPPOSITE, BEING SO FIGETY OR RESTLESS THAT YOU HAVE BEEN MOVING AROUND A LOT MORE THAN USUAL: MORE THAN HALF THE DAYS
9. THOUGHTS THAT YOU WOULD BE BETTER OFF DEAD, OR OF HURTING YOURSELF: NOT AT ALL
2. FEELING DOWN, DEPRESSED OR HOPELESS: MORE THAN HALF THE DAYS
SUM OF ALL RESPONSES TO PHQ QUESTIONS 1-9: 16
7. TROUBLE CONCENTRATING ON THINGS, SUCH AS READING THE NEWSPAPER OR WATCHING TELEVISION: MORE THAN HALF THE DAYS
4. FEELING TIRED OR HAVING LITTLE ENERGY: MORE THAN HALF THE DAYS
3. TROUBLE FALLING OR STAYING ASLEEP: SEVERAL DAYS
SUM OF ALL RESPONSES TO PHQ QUESTIONS 1-9: 16
5. POOR APPETITE OR OVEREATING: NEARLY EVERY DAY
8. MOVING OR SPEAKING SO SLOWLY THAT OTHER PEOPLE COULD HAVE NOTICED. OR THE OPPOSITE - BEING SO FIDGETY OR RESTLESS THAT YOU HAVE BEEN MOVING AROUND A LOT MORE THAN USUAL: MORE THAN HALF THE DAYS
9. THOUGHTS THAT YOU WOULD BE BETTER OFF DEAD, OR OF HURTING YOURSELF: NOT AT ALL
6. FEELING BAD ABOUT YOURSELF - OR THAT YOU ARE A FAILURE OR HAVE LET YOURSELF OR YOUR FAMILY DOWN: MORE THAN HALF THE DAYS
10. IF YOU CHECKED OFF ANY PROBLEMS, HOW DIFFICULT HAVE THESE PROBLEMS MADE IT FOR YOU TO DO YOUR WORK, TAKE CARE OF THINGS AT HOME, OR GET ALONG WITH OTHER PEOPLE: VERY DIFFICULT
10. IF YOU CHECKED OFF ANY PROBLEMS, HOW DIFFICULT HAVE THESE PROBLEMS MADE IT FOR YOU TO DO YOUR WORK, TAKE CARE OF THINGS AT HOME, OR GET ALONG WITH OTHER PEOPLE: VERY DIFFICULT
4. FEELING TIRED OR HAVING LITTLE ENERGY: MORE THAN HALF THE DAYS
5. POOR APPETITE OR OVEREATING: NEARLY EVERY DAY
7. TROUBLE CONCENTRATING ON THINGS, SUCH AS READING THE NEWSPAPER OR WATCHING TELEVISION: MORE THAN HALF THE DAYS
SUM OF ALL RESPONSES TO PHQ QUESTIONS 1-9: 16
1. LITTLE INTEREST OR PLEASURE IN DOING THINGS: MORE THAN HALF THE DAYS
1. LITTLE INTEREST OR PLEASURE IN DOING THINGS: MORE THAN HALF THE DAYS
2. FEELING DOWN, DEPRESSED OR HOPELESS: MORE THAN HALF THE DAYS

## 2025-05-21 NOTE — PROGRESS NOTES
Pt is here for EMB due to DUB. Consent signed and urine preg test in office is neg. She would like to proceed with ablation sx.

## 2025-05-21 NOTE — PROGRESS NOTES
Roxann Colindres is a 45 y.o.  with history of menorrhagia who presents today for endometrial biopsy.    /79   Pulse 75   Wt 81.2 kg (179 lb)   LMP 04/15/2025   BMI 28.89 kg/m²     Endometrial Biopsy Procedure Note    Pre-operative Diagnosis: DUB    Post-operative Diagnosis: same    Indications: abnormal uterine bleeding    Procedure Details    Urine pregnancy test was done in office and result was neg.  The risks (including infection, bleeding, pain, and uterine perforation) and benefits of the procedure were explained to the patient and Written informed consent was obtained.      The patient was placed in the dorsal lithotomy position.  Speculum inserted in the vagina, and the cervix prepped with povidone iodine. Single tooth tenaculum applied to anterior cervical lip.  Uterus was sounded to 8.5cm. Pipelle endometrial aspirator was inserted and gentle pressure applied. Adequate tissue sample obtained. Sent for pathology. Pt tolerated well.       Condition:  Stable    Complications:  None    Plan:    The patient was advised to call for any fever or for prolonged or severe pain or bleeding. She was advised to use OTC ibuprofen as needed for mild to moderate pain. She was advised to avoid vaginal intercourse for 48 hours or until the bleeding has completely stopped.    TYLOR Macdonald, am scribing for and in the presence of VASQUEZ Parry.  5/21/25  10:00 AM CDT     I have seen and examined the patient independently.  I reviewed all laboratory and imaging studies that are relevant.  I have reviewed and made any appropriate changes to the HPI.    Electronically signed by HERNAN Martinez CNM on 5/21/25  at 1:42 PM CDT
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
## 2025-05-23 ENCOUNTER — RESULTS FOLLOW-UP (OUTPATIENT)
Dept: OBGYN CLINIC | Age: 45
End: 2025-05-23

## 2025-05-23 ENCOUNTER — TELEPHONE (OUTPATIENT)
Dept: PSYCHIATRY | Age: 45
End: 2025-05-23

## 2025-05-23 NOTE — TELEPHONE ENCOUNTER
Called patient to remind them of their appointment   -left voicemail, requesting a return call      Reminded patient of their     copay for their appointment. Reminded patient to complete their visit pre-check/digital registration in [employer].    Electronically signed by Alicia D Giraldo-Reyes on 5/23/2025 at 10:29 AM

## 2025-05-24 ASSESSMENT — PATIENT HEALTH QUESTIONNAIRE - PHQ9
6. FEELING BAD ABOUT YOURSELF - OR THAT YOU ARE A FAILURE OR HAVE LET YOURSELF OR YOUR FAMILY DOWN: MORE THAN HALF THE DAYS
SUM OF ALL RESPONSES TO PHQ QUESTIONS 1-9: 18
9. THOUGHTS THAT YOU WOULD BE BETTER OFF DEAD, OR OF HURTING YOURSELF: NOT AT ALL
4. FEELING TIRED OR HAVING LITTLE ENERGY: MORE THAN HALF THE DAYS
SUM OF ALL RESPONSES TO PHQ QUESTIONS 1-9: 18
5. POOR APPETITE OR OVEREATING: MORE THAN HALF THE DAYS
8. MOVING OR SPEAKING SO SLOWLY THAT OTHER PEOPLE COULD HAVE NOTICED. OR THE OPPOSITE, BEING SO FIGETY OR RESTLESS THAT YOU HAVE BEEN MOVING AROUND A LOT MORE THAN USUAL: MORE THAN HALF THE DAYS
10. IF YOU CHECKED OFF ANY PROBLEMS, HOW DIFFICULT HAVE THESE PROBLEMS MADE IT FOR YOU TO DO YOUR WORK, TAKE CARE OF THINGS AT HOME, OR GET ALONG WITH OTHER PEOPLE: VERY DIFFICULT
1. LITTLE INTEREST OR PLEASURE IN DOING THINGS: SEVERAL DAYS
2. FEELING DOWN, DEPRESSED OR HOPELESS: NEARLY EVERY DAY
3. TROUBLE FALLING OR STAYING ASLEEP: NEARLY EVERY DAY
SUM OF ALL RESPONSES TO PHQ QUESTIONS 1-9: 18
SUM OF ALL RESPONSES TO PHQ QUESTIONS 1-9: 18
7. TROUBLE CONCENTRATING ON THINGS, SUCH AS READING THE NEWSPAPER OR WATCHING TELEVISION: NEARLY EVERY DAY

## 2025-05-24 ASSESSMENT — ANXIETY QUESTIONNAIRES
7. FEELING AFRAID AS IF SOMETHING AWFUL MIGHT HAPPEN: MORE THAN HALF THE DAYS
1. FEELING NERVOUS, ANXIOUS, OR ON EDGE: MORE THAN HALF THE DAYS
6. BECOMING EASILY ANNOYED OR IRRITABLE: MORE THAN HALF THE DAYS
2. NOT BEING ABLE TO STOP OR CONTROL WORRYING: NEARLY EVERY DAY
4. TROUBLE RELAXING: MORE THAN HALF THE DAYS
3. WORRYING TOO MUCH ABOUT DIFFERENT THINGS: NEARLY EVERY DAY
IF YOU CHECKED OFF ANY PROBLEMS ON THIS QUESTIONNAIRE, HOW DIFFICULT HAVE THESE PROBLEMS MADE IT FOR YOU TO DO YOUR WORK, TAKE CARE OF THINGS AT HOME, OR GET ALONG WITH OTHER PEOPLE: VERY DIFFICULT
GAD7 TOTAL SCORE: 16
5. BEING SO RESTLESS THAT IT IS HARD TO SIT STILL: MORE THAN HALF THE DAYS

## 2025-05-27 SDOH — ECONOMIC STABILITY: FOOD INSECURITY: WITHIN THE PAST 12 MONTHS, THE FOOD YOU BOUGHT JUST DIDN'T LAST AND YOU DIDN'T HAVE MONEY TO GET MORE.: SOMETIMES TRUE

## 2025-05-27 SDOH — ECONOMIC STABILITY: FOOD INSECURITY: WITHIN THE PAST 12 MONTHS, YOU WORRIED THAT YOUR FOOD WOULD RUN OUT BEFORE YOU GOT MONEY TO BUY MORE.: SOMETIMES TRUE

## 2025-05-27 SDOH — ECONOMIC STABILITY: INCOME INSECURITY: IN THE LAST 12 MONTHS, WAS THERE A TIME WHEN YOU WERE NOT ABLE TO PAY THE MORTGAGE OR RENT ON TIME?: YES

## 2025-05-27 SDOH — ECONOMIC STABILITY: TRANSPORTATION INSECURITY
IN THE PAST 12 MONTHS, HAS THE LACK OF TRANSPORTATION KEPT YOU FROM MEDICAL APPOINTMENTS OR FROM GETTING MEDICATIONS?: NO

## 2025-05-27 ASSESSMENT — ANXIETY QUESTIONNAIRES
1. FEELING NERVOUS, ANXIOUS, OR ON EDGE: MORE THAN HALF THE DAYS
IF YOU CHECKED OFF ANY PROBLEMS ON THIS QUESTIONNAIRE, HOW DIFFICULT HAVE THESE PROBLEMS MADE IT FOR YOU TO DO YOUR WORK, TAKE CARE OF THINGS AT HOME, OR GET ALONG WITH OTHER PEOPLE: VERY DIFFICULT
4. TROUBLE RELAXING: MORE THAN HALF THE DAYS
7. FEELING AFRAID AS IF SOMETHING AWFUL MIGHT HAPPEN: MORE THAN HALF THE DAYS
6. BECOMING EASILY ANNOYED OR IRRITABLE: MORE THAN HALF THE DAYS
5. BEING SO RESTLESS THAT IT IS HARD TO SIT STILL: MORE THAN HALF THE DAYS
2. NOT BEING ABLE TO STOP OR CONTROL WORRYING: NEARLY EVERY DAY
3. WORRYING TOO MUCH ABOUT DIFFERENT THINGS: NEARLY EVERY DAY

## 2025-05-27 ASSESSMENT — PATIENT HEALTH QUESTIONNAIRE - PHQ9
10. IF YOU CHECKED OFF ANY PROBLEMS, HOW DIFFICULT HAVE THESE PROBLEMS MADE IT FOR YOU TO DO YOUR WORK, TAKE CARE OF THINGS AT HOME, OR GET ALONG WITH OTHER PEOPLE: VERY DIFFICULT
7. TROUBLE CONCENTRATING ON THINGS, SUCH AS READING THE NEWSPAPER OR WATCHING TELEVISION: NEARLY EVERY DAY
8. MOVING OR SPEAKING SO SLOWLY THAT OTHER PEOPLE COULD HAVE NOTICED. OR THE OPPOSITE - BEING SO FIDGETY OR RESTLESS THAT YOU HAVE BEEN MOVING AROUND A LOT MORE THAN USUAL: MORE THAN HALF THE DAYS
SUM OF ALL RESPONSES TO PHQ QUESTIONS 1-9: 18
3. TROUBLE FALLING OR STAYING ASLEEP: NEARLY EVERY DAY
2. FEELING DOWN, DEPRESSED OR HOPELESS: NEARLY EVERY DAY
6. FEELING BAD ABOUT YOURSELF - OR THAT YOU ARE A FAILURE OR HAVE LET YOURSELF OR YOUR FAMILY DOWN: MORE THAN HALF THE DAYS
4. FEELING TIRED OR HAVING LITTLE ENERGY: MORE THAN HALF THE DAYS
5. POOR APPETITE OR OVEREATING: MORE THAN HALF THE DAYS
1. LITTLE INTEREST OR PLEASURE IN DOING THINGS: SEVERAL DAYS
9. THOUGHTS THAT YOU WOULD BE BETTER OFF DEAD, OR OF HURTING YOURSELF: NOT AT ALL

## 2025-05-29 ENCOUNTER — OFFICE VISIT (OUTPATIENT)
Dept: OBGYN CLINIC | Age: 45
End: 2025-05-29
Payer: MEDICAID

## 2025-05-29 VITALS
HEART RATE: 65 BPM | WEIGHT: 184 LBS | BODY MASS INDEX: 29.57 KG/M2 | SYSTOLIC BLOOD PRESSURE: 127 MMHG | HEIGHT: 66 IN | DIASTOLIC BLOOD PRESSURE: 82 MMHG

## 2025-05-29 DIAGNOSIS — N92.1 MENOMETRORRHAGIA: ICD-10-CM

## 2025-05-29 DIAGNOSIS — N93.8 DUB (DYSFUNCTIONAL UTERINE BLEEDING): ICD-10-CM

## 2025-05-29 DIAGNOSIS — Z71.89 SURGICAL COUNSELING VISIT: Primary | ICD-10-CM

## 2025-05-29 DIAGNOSIS — D25.1 INTRAMURAL LEIOMYOMA OF UTERUS: ICD-10-CM

## 2025-05-29 LAB
ERYTHROCYTE [DISTWIDTH] IN BLOOD BY AUTOMATED COUNT: 12.7 % (ref 11.5–14.5)
HCT VFR BLD AUTO: 37.7 % (ref 37–47)
HGB BLD-MCNC: 11.9 G/DL (ref 12–16)
MCH RBC QN AUTO: 29.8 PG (ref 27–31)
MCHC RBC AUTO-ENTMCNC: 31.6 G/DL (ref 33–37)
MCV RBC AUTO: 94.5 FL (ref 81–99)
PLATELET # BLD AUTO: 405 K/UL (ref 130–400)
PMV BLD AUTO: 10.6 FL (ref 9.4–12.3)
RBC # BLD AUTO: 3.99 M/UL (ref 4.2–5.4)
TSH SERPL DL<=0.005 MIU/L-ACNC: 1.47 UIU/ML (ref 0.27–4.2)
WBC # BLD AUTO: 8.1 K/UL (ref 4.8–10.8)

## 2025-05-29 PROCEDURE — 99214 OFFICE O/P EST MOD 30 MIN: CPT | Performed by: OBSTETRICS & GYNECOLOGY

## 2025-05-29 RX ORDER — MISOPROSTOL 200 UG/1
200 TABLET ORAL ONCE
Qty: 1 TABLET | Refills: 0 | Status: SHIPPED | OUTPATIENT
Start: 2025-05-29 | End: 2025-05-29

## 2025-05-29 NOTE — PROGRESS NOTES
Roxann Colindres (:  1980) is a 45 y.o. female, Established patient, here for evaluation of the following chief complaint(s):  Consultation        Subjective   History of Present Illness  The patient is a 45-year-old female who presents for evaluation of abnormal bleeding.    She reports experiencing abnormal uterine bleeding, characterized by a 22-day continuous bleed followed by a 3-day cessation. The bleeding resumed on the day of her biopsy. This is not her first encounter with prolonged menstrual periods, with a previous maximum duration of 10 days. However, the recent 22-day period was unprecedented in its severity. During her heaviest days, she resorted to using pads, which required changing every few hours due to the presence of numerous clots. She also experienced significant cramping. Additionally, she reports pelvic pain outside of her menstrual cycle and discomfort during intercourse, symptoms that have been persistent for some time.    Her medical history includes tubal ligation and 4 vaginal deliveries. She has been diagnosed with fibromyalgia accompanied by neuropathy, PTSD, an unspecified issue with her left shoulder for which she is undergoing physical therapy, and ruptured blood vessels in her left ear.    GYNECOLOGICAL HISTORY:  - Duration: 22 days  - Menstrual pain: Present    CONTRACEPTION:  - Tubal ligation    OBSTETRICAL HISTORY:  Obstetrics History discussed   4, Para 4    PAST MEDICAL HISTORY:  - Fibromyalgia with neuropathy  - PTSD  - Unspecified issue with left shoulder (undergoing physical therapy)  - Ruptured blood vessels in left ear    PAST SURGICAL HISTORY:  - Tubal ligation    Review of Systems   Constitutional: Negative.    HENT: Negative.     Eyes: Negative.    Respiratory: Negative.     Cardiovascular: Negative.    Gastrointestinal: Negative.    Endocrine: Negative.    Genitourinary:  Positive for menstrual problem.   Musculoskeletal: Negative.    Skin:

## 2025-05-29 NOTE — PROGRESS NOTES
Consent signed for a Hysteroscopy Novasure ablation, dilation and curettage, Sonata procedure scheduled for 06/24/25. Post/Preoperative instructions given.   Procedures and risks discussed. Risks include bleeding and infection and perforation of the uterus which would result in the inability to complete the procedure. All questions answered.   Cytotec 200 mcg sent to pharmacy.   Will notify the patient if any surgical pathology results.   RTC in 8 weeks post-op.  Pt has HX of bilat. Salpingectomy.

## 2025-06-02 ENCOUNTER — TELEPHONE (OUTPATIENT)
Dept: PSYCHIATRY | Age: 45
End: 2025-06-02

## 2025-06-02 ENCOUNTER — PROCEDURE VISIT (OUTPATIENT)
Dept: OTOLARYNGOLOGY | Facility: CLINIC | Age: 45
End: 2025-06-02
Payer: MEDICAID

## 2025-06-02 ENCOUNTER — OFFICE VISIT (OUTPATIENT)
Dept: OTOLARYNGOLOGY | Facility: CLINIC | Age: 45
End: 2025-06-02
Payer: MEDICAID

## 2025-06-02 VITALS
TEMPERATURE: 98 F | HEART RATE: 83 BPM | SYSTOLIC BLOOD PRESSURE: 125 MMHG | WEIGHT: 183 LBS | HEIGHT: 66 IN | DIASTOLIC BLOOD PRESSURE: 85 MMHG | RESPIRATION RATE: 20 BRPM | BODY MASS INDEX: 29.41 KG/M2

## 2025-06-02 DIAGNOSIS — H93.13 TINNITUS, BILATERAL: ICD-10-CM

## 2025-06-02 DIAGNOSIS — R42 DIZZINESS: ICD-10-CM

## 2025-06-02 DIAGNOSIS — Z01.10 HEARING WITHIN NORMAL LIMITS IN BOTH EARS: Primary | ICD-10-CM

## 2025-06-02 DIAGNOSIS — M54.2 CERVICALGIA: ICD-10-CM

## 2025-06-02 PROCEDURE — 1159F MED LIST DOCD IN RCRD: CPT | Performed by: EMERGENCY MEDICINE

## 2025-06-02 PROCEDURE — 92567 TYMPANOMETRY: CPT

## 2025-06-02 PROCEDURE — 99213 OFFICE O/P EST LOW 20 MIN: CPT | Performed by: EMERGENCY MEDICINE

## 2025-06-02 PROCEDURE — 1160F RVW MEDS BY RX/DR IN RCRD: CPT | Performed by: EMERGENCY MEDICINE

## 2025-06-02 PROCEDURE — 92557 COMPREHENSIVE HEARING TEST: CPT
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
## 2025-06-02 NOTE — PROGRESS NOTES
AARON Rose  [facility]  [address], [address]  [place] [address]  Fax [phone]  Phone [phone]      Visit Type: NEW PATIENT   Chief Complaint   Patient presents with    Dizziness    Hearing Loss    Tinnitus           HPI      History of Present Illness  The patient is a 45-year-old female who presents as a new patient with complaints of dizziness and vertigo.    She reports experiencing episodes of dizziness, characterized by a sensation of everything moving to one side or a feeling of being on a wheel. These episodes occur every few months and can persist for several days. She also describes a quivering sensation, akin to numbness in her face or other body parts, which is accompanied by fatigue, prompting her to lie down. She occasionally experiences headaches, which are not consistently associated with the quivering sensation and do not respond to over-the-counter medications.    She recalls an incident where she experienced sudden dizziness and loss of consciousness after stretching her neck following a workout session. This episode was characterized by an uncontrollable sensation of everything shifting to the right in a circular motion. She sought emergency care the following day.    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    She expresses concern about a perceived protrusion of one eye compared to the other, despite reassurances from her ophthalmologist that her eyes are normal. She also reports dryness in her eyes.    She has not yet consulted with a neurologist but has an appointment scheduled for 06/15/2025 at [facility]. She has not undergone physical therapy for her ears or neck. She maintains a daily water intake of approximately 66 ounces and consumes caffeine.    Results  Imaging   - MRI of the  left lateral canal: Possible loss of fluid signal within the left lateral canal, but no dehiscence of the bone    Diagnostic Testing   - Hearing test: Essentially normal hearing bilaterally, with a slight dip around the 3000 julieth in the right ear    Past Medical History:   Diagnosis Date    Abnormal ECG 8-25-24    Slow heart    Anxiety     Asthma 2021    Trouble catching breath    BV (bacterial vaginosis)     Depression     Fibromyalgia     Fibromyalgia, primary 07/2019    Gallbladder sludge 09/04/2024    Headache     Hypertension     Neuropathy     Scoliosis 09/2024    Tachycardia        Past Surgical History:   Procedure Laterality Date    ANKLE SURGERY      CHOLECYSTECTOMY N/A 10/01/2024    Procedure: LAPAROSCOPIC ROBOTIC ASSISTED CHOLECYSTECTOMY WTIH PRE-OPERATIVE INDOCYANINE GREEN INJECTION;  Surgeon: Jolly Dia MD;  Location: [facility];  Service: Robotics - DaVinci;  Laterality: N/A;    COLONOSCOPY N/A 09/19/2024    Dr. Anne-One 7 mm adenomatous  polyp in the rectum    DILATATION AND CURETTAGE      ENDOSCOPY N/A 12/04/2023    No endoscopic esophageal abnormality to explain patient's dysphagia. Esophagus dilated. Dilated. - There is no endoscopic evidence of Ingram's esophagus. - Normal stomach. - Normal examined duodenum. - No specimens collecte    GALLBLADDER SURGERY  10/01/2024    TUBAL ABDOMINAL LIGATION      WISDOM TOOTH EXTRACTION  01/01/2021       Family History: Her family history includes Anemia in her sister; Arrhythmia in her paternal grandmother; Arthritis in her mother; Asthma in her mother; Clotting disorder in her father; Colon polyps in her mother; Heart attack in her father; Heart disease in her mother; Hypertension in her father and mother; No Known Problems in her brother, daughter, maternal aunt, maternal grandmother, paternal aunt, and son; Stroke in her father.     Social History: She  reports that she has never smoked. She has never used smokeless tobacco. She reports that she  does not currently use alcohol. She reports that she does not use drugs.    Home Medications:  Azelastine HCl, DULoxetine, Metoprolol Succinate, acetaminophen, diclofenac, fluticasone, gabapentin, lamoTRIgine, loratadine, meclizine, mometasone, tiZANidine, and traZODone    Allergies:  She is allergic to adhesive tape, amoxicillin, clindamycin/lincomycin, doxycycline, erythromycin, sulfa antibiotics, and tape.       Vital Signs:   Temp:  [98 °F (36.7 °C)] 98 °F (36.7 °C)  Heart Rate:  [83] 83  Resp:  [20] 20  BP: (125)/(85) 125/85  ENT Physical Exam  Constitutional  Appearance: patient appears well-developed, well-nourished and well-groomed,  Communication/Voice: communication appropriate for developmental age; vocal quality normal;  Head and Face  Appearance: head appears normal, face appears normal and face appears atraumatic;  Palpation: facial palpation normal;  Salivary: glands normal;  Ear  Hearing: intact;  Auricles: right auricle normal; left auricle normal;  External Mastoids: right external mastoid normal; left external mastoid normal;  Ear Canals: right ear canal normal; left ear canal normal;  Tympanic Membranes: right tympanic membrane normal; left tympanic membrane normal;  Nose  External Nose: nares patent bilaterally; external nose normal;  Oral Cavity/Oropharynx  Lips: normal;  Teeth: normal;  Neck  Neck: neck normal; neck palpation normal;  Neck comments: Muscle spasm bilateral trap  Respiratory  Inspection: breathing unlabored; normal breathing rate;  Cardiovascular  Inspection: extremities are warm and well perfused;  Lymphatic  Palpation: lymph nodes normal;  Neurovestibular  Gait: gait normal;  Station: standing is normal; negative Romberg test;  Coordination: coordination normal; no pronator drift noted; resting tremor not present;  Strength: normal strength  Sensation - Light Touch: light touch normal  Sensation - Pinprick: pinprick normal  Vestibular: normal;  Mental Status: alert and  oriented;  Cranial Nerves: cranial nerves intact; facial sensation normal;       Physical Exam  Ears: Eardrums are moving normally bilaterally.  Neck: Muscle tightness noted in the neck.  Neurological: Strength and resistance normal in upper extremities, coordination intact, and extraocular movements normal.        Result Review       RESULTS REVIEW    I have reviewed the patients old records in the chart.   The following results/records were reviewed:   Procedure visit with Akua Pardo Au.D (06/02/2025) hearing wnl, type A bilaterally       Assessment & Plan  Hearing within normal limits in both ears    Tinnitus, bilateral    Dizziness    Cervicalgia       Assessment & Plan  1. Dizziness.  Symptoms suggest a potential cervical origin for dizziness. The MRI indicates a possible loss of fluid signal within the left lateral canal, but no dehiscence of the bone is observed. A referral for vestibular rehabilitation will be initiated to evaluate and treat potential vertigo. This specialized physical therapy can also assess the neck for any underlying issues. Advised to reduce caffeine and sodium intake.    2. Vertigo.  Experiences vertigo every few months, lasting for days at a time. The MRI shows a possible loss of fluid signal within the left lateral canal. Vestibular rehabilitation will be initiated to evaluate and treat potential vertigo. If physical therapy identifies that the vertigo is originating from the neck, appropriate treatment will be provided.    3. Tinnitus.  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 Hearing test shows essentially normal hearing bilaterally, with a slight dip around the 3000 julieth in the right ear. No immediate treatment is planned, but monitoring will continue.    4. Dry eyes.  Reports dry eyes and a sensation that one eye sticks out further than the other. Advised to see an ophthalmologist for further  evaluation, including checking ocular pressures.    Risks, benefits, and alternatives of treatment were discussed. Reducing caffeine and sodium intake may help manage symptoms. Vestibular rehabilitation can help evaluate and treat vertigo and assess the neck for underlying issues. If the vertigo is found to be cervical in nature, physical therapy can provide appropriate treatment. Follow-up with an ophthalmologist is recommended for ocular concerns.    Follow-up in 6 weeks.     Orders Placed This Encounter   Procedures    Ambulatory Referral to Physical Therapy for Evaluation & Treatment           Return in about 6 weeks (around 7/14/2025) for Follow up with AARON Rose.        Electronically signed by AAORN Rose 06/02/25 4:04 PM CDT.     Patient or patient representative verbalized consent for the use of Ambient Listening during the visit with  AARON Rose for chart documentation. 6/2/2025  16:04 CDT

## 2025-06-02 NOTE — TELEPHONE ENCOUNTER
Called patient to remind them of their appointment   -left voicemail, requesting a return call  Reminded patient to complete their visit pre-check/digital registration in [employer].    Electronically signed by Jess Sidhu MA on 6/2/2025 at 2:24 PM

## 2025-06-03 ENCOUNTER — OFFICE VISIT (OUTPATIENT)
Dept: PSYCHIATRY | Age: 45
End: 2025-06-03
Payer: MEDICAID

## 2025-06-03 VITALS
HEART RATE: 70 BPM | BODY MASS INDEX: 29.22 KG/M2 | HEIGHT: 66 IN | WEIGHT: 181.8 LBS | OXYGEN SATURATION: 99 % | DIASTOLIC BLOOD PRESSURE: 77 MMHG | TEMPERATURE: 97.7 F | SYSTOLIC BLOOD PRESSURE: 120 MMHG

## 2025-06-03 DIAGNOSIS — G47.00 INSOMNIA, UNSPECIFIED TYPE: ICD-10-CM

## 2025-06-03 DIAGNOSIS — F41.1 GENERALIZED ANXIETY DISORDER: ICD-10-CM

## 2025-06-03 DIAGNOSIS — F33.0 MAJOR DEPRESSIVE DISORDER, RECURRENT, MILD: Primary | ICD-10-CM

## 2025-06-03 PROCEDURE — 99214 OFFICE O/P EST MOD 30 MIN: CPT

## 2025-06-03 RX ORDER — DULOXETIN HYDROCHLORIDE 60 MG/1
60 CAPSULE, DELAYED RELEASE ORAL DAILY
Qty: 90 CAPSULE | Refills: 1 | Status: SHIPPED | OUTPATIENT
Start: 2025-06-03 | End: 2025-11-30

## 2025-06-03 RX ORDER — LAMOTRIGINE 100 MG/1
100 TABLET ORAL DAILY
Qty: 90 TABLET | Refills: 2 | Status: SHIPPED | OUTPATIENT
Start: 2025-06-03 | End: 2026-02-28

## 2025-06-03 ASSESSMENT — PATIENT HEALTH QUESTIONNAIRE - PHQ9
5. POOR APPETITE OR OVEREATING: NEARLY EVERY DAY
1. LITTLE INTEREST OR PLEASURE IN DOING THINGS: MORE THAN HALF THE DAYS
SUM OF ALL RESPONSES TO PHQ QUESTIONS 1-9: 20
6. FEELING BAD ABOUT YOURSELF - OR THAT YOU ARE A FAILURE OR HAVE LET YOURSELF OR YOUR FAMILY DOWN: MORE THAN HALF THE DAYS
SUM OF ALL RESPONSES TO PHQ QUESTIONS 1-9: 20
4. FEELING TIRED OR HAVING LITTLE ENERGY: NEARLY EVERY DAY
7. TROUBLE CONCENTRATING ON THINGS, SUCH AS READING THE NEWSPAPER OR WATCHING TELEVISION: NEARLY EVERY DAY
SUM OF ALL RESPONSES TO PHQ QUESTIONS 1-9: 20
9. THOUGHTS THAT YOU WOULD BE BETTER OFF DEAD, OR OF HURTING YOURSELF: NOT AT ALL
2. FEELING DOWN, DEPRESSED OR HOPELESS: MORE THAN HALF THE DAYS
3. TROUBLE FALLING OR STAYING ASLEEP: MORE THAN HALF THE DAYS
SUM OF ALL RESPONSES TO PHQ QUESTIONS 1-9: 20
8. MOVING OR SPEAKING SO SLOWLY THAT OTHER PEOPLE COULD HAVE NOTICED. OR THE OPPOSITE, BEING SO FIGETY OR RESTLESS THAT YOU HAVE BEEN MOVING AROUND A LOT MORE THAN USUAL: NEARLY EVERY DAY

## 2025-06-03 ASSESSMENT — ENCOUNTER SYMPTOMS
RESPIRATORY NEGATIVE: 1
ALLERGIC/IMMUNOLOGIC NEGATIVE: 1
EYES NEGATIVE: 1
EYES NEGATIVE: 1
GASTROINTESTINAL NEGATIVE: 1
ALLERGIC/IMMUNOLOGIC NEGATIVE: 1
GASTROINTESTINAL NEGATIVE: 1
RESPIRATORY NEGATIVE: 1

## 2025-06-03 NOTE — PROGRESS NOTES
6/3/2025 11:10 AM   Progress Note          Roxann Colindres 1980      Chief Complaint   Patient presents with    Follow-up         Subjective:    Patient is a 45 y.o. female with history of  HTN, depression, fibromyalgia, anxiety, headaches presents for follow up. Last seen in clinic or telehealth on 5/21/2025 with COLTEN Prater for therapy and prior records were reviewed. On Lamictal, increased last visit. On Cymbalta, increased last visit. On Trazodone. Sleep study negative. On Gabapentin per other for neuropathy.     Reports sleep has improved, wakes up some during the night at times due to sweating and feeling hot but able to fall asleep easily, still some fatigue during the day, relays her OB/GYN says she is perimenopausal, avg 5-6 hours of sleep per night. Relays she has an ablation coming up on the 24th due to heavy menstrual cycles, HGB decreased on last check, wonders about if she is suppose to be on iron supplementation. Appetite fluctuates, weight stable. Feels she is doing ok overall, tolerated medication changes and denies side effects. Mother having some health concerns which has caused her some stress and worry. Relays mood has felt more stable and less labile, more concerned with anxiety lately, difficult at times leaving the house or being around people. Reports she is on unemployment currently, was fired from her job last month due to time off. Doing PT for her left shoulder. Denies thoughts of SI/HI or AV hallucinations.       Absent suicidal ideations.   Reports medication compliance as good.     Previous medication trials:   Cymbalta  Gabapentin  Lamictal  Wellbutrin (on edge)  Prozac  Lexapro  Celexa  Trazodone    Current Substance Use:   Alcohol: Denies   illicit drug use: Denies   Marijuana: Denies   Tobacco use: Denies  Vape: Denies   Caffeine: one hot tea a day    Objective:      /77   Pulse 70   Temp 97.7 °F (36.5 °C)   Ht 1.676 m (5' 6\")   Wt 82.5 kg (181 lb 12.8 oz)   LMP

## 2025-06-04 ENCOUNTER — PREP FOR PROCEDURE (OUTPATIENT)
Dept: OBGYN CLINIC | Age: 45
End: 2025-06-04

## 2025-06-04 DIAGNOSIS — N93.8 DYSFUNCTIONAL UTERINE BLEEDING: ICD-10-CM

## 2025-06-04 DIAGNOSIS — R93.89 ABNORMAL RADIOLOGICAL FINDINGS IN SKIN AND SUBCUTANEOUS TISSUE: ICD-10-CM

## 2025-06-04 DIAGNOSIS — R10.2 PELVIC PAIN SYNDROME: ICD-10-CM

## 2025-06-05 RX ORDER — SODIUM CHLORIDE 0.9 % (FLUSH) 0.9 %
5-40 SYRINGE (ML) INJECTION PRN
Status: CANCELLED | OUTPATIENT
Start: 2025-06-05

## 2025-06-05 RX ORDER — SODIUM CHLORIDE, SODIUM LACTATE, POTASSIUM CHLORIDE, CALCIUM CHLORIDE 600; 310; 30; 20 MG/100ML; MG/100ML; MG/100ML; MG/100ML
INJECTION, SOLUTION INTRAVENOUS CONTINUOUS
Status: CANCELLED | OUTPATIENT
Start: 2025-06-05

## 2025-06-05 RX ORDER — SODIUM CHLORIDE 0.9 % (FLUSH) 0.9 %
5-40 SYRINGE (ML) INJECTION EVERY 12 HOURS SCHEDULED
Status: CANCELLED | OUTPATIENT
Start: 2025-06-05

## 2025-06-05 RX ORDER — SODIUM CHLORIDE 9 MG/ML
INJECTION, SOLUTION INTRAVENOUS PRN
Status: CANCELLED | OUTPATIENT
Start: 2025-06-05

## 2025-06-16 ENCOUNTER — HOSPITAL ENCOUNTER (OUTPATIENT)
Dept: PHYSICAL THERAPY | Facility: HOSPITAL | Age: 45
Setting detail: THERAPIES SERIES
Discharge: HOME | End: 2025-06-16
Payer: MEDICAID

## 2025-06-16 DIAGNOSIS — R51.9 BILATERAL HEADACHES: ICD-10-CM

## 2025-06-16 DIAGNOSIS — M54.2 CERVICALGIA: ICD-10-CM

## 2025-06-16 DIAGNOSIS — R42 DIZZINESS: Primary | ICD-10-CM

## 2025-06-16 PROCEDURE — 97161 PT EVAL LOW COMPLEX 20 MIN: CPT

## 2025-06-16 NOTE — THERAPY EVALUATION
Physical Therapy Initial Evaluation and Plan of Care  [facility]patient Therapy Services  A department of [address], [place] [address]    Patient: Zena Zheng             : 1980  Today's Date: 2025  Referring practitioner: ABDIEL Rose  Date of Initial Visit: 2025    Visit Diagnoses:    ICD-10-CM ICD-9-CM   1. Dizziness  R42 780.4   2. Cervicalgia  M54.2 723.1   3. Bilateral headaches  R51.9 784.0     Past Medical History:   Diagnosis Date    Abnormal ECG 24    Slow heart    Anxiety     Asthma     Trouble catching breath    BV (bacterial vaginosis)     Depression     Fibromyalgia     Fibromyalgia, primary 2019    Gallbladder sludge 2024    Headache     Hypertension     Neuropathy     Scoliosis 2024    Tachycardia      Past Surgical History:   Procedure Laterality Date    ANKLE SURGERY      CHOLECYSTECTOMY N/A 10/01/2024    Procedure: LAPAROSCOPIC ROBOTIC ASSISTED CHOLECYSTECTOMY WTIH PRE-OPERATIVE INDOCYANINE GREEN INJECTION;  Surgeon: Jolly Dia MD;  Location: [facility];  Service: Robotics - DaVinci;  Laterality: N/A;    COLONOSCOPY N/A 2024    Dr. Anne-One 7 mm adenomatous  polyp in the rectum    DILATATION AND CURETTAGE      ENDOSCOPY N/A 2023    No endoscopic esophageal abnormality to explain patient's dysphagia. Esophagus dilated. Dilated. - There is no endoscopic evidence of Ingram's esophagus. - Normal stomach. - Normal examined duodenum. - No specimens collecte    GALLBLADDER SURGERY  10/01/2024    TUBAL ABDOMINAL LIGATION      WISDOM TOOTH EXTRACTION  2021       SUBJECTIVE     Subjective Evaluation    History of Present Illness  Mechanism of injury: Patient states that she has been dealing with TMJ/neck issues for a few years now. Her neck has always felt a bit tight and she started getting TMJ pain about 3 years ago. The jaw has mostly stopped hurting but she now gets dizziness, her  "ears feel stopped up, and headaches that started about 6 months ago. She has had imaging done to her head that ruled out vascular issues to there head. She says her neck pain is often a precursor to the dizziness. She says she sometimes has had to stay in bed for a whole day due to he dizziness.       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  Progression: worsening (evolved)    Social Support  Lives with: young children    Patient Goals  Patient goal: \"improved ringing of the ears, less dizzinss, less trouble at work.\"       Type of Dizziness: spinning  Dizziness VAS when occurs: 10/10  Frequency of Dizziness: couple times per month  Duration of Dizziness: Anywhere from minutes to multiple hours to a full day.     Outcome Measure:   PT G-Codes  Outcome Measure Options: (P) Dizziness Handicap Inventory: 68 /100 - Severe handicap    OBJECTIVE     Objective          Tenderness   Cervical Spine   Tenderness in the left transverse process.     Additional Tenderness Details  Tender to suboccipitals     Active Range of Motion   Cervical/Thoracic Spine   Cervical    Flexion: WFL  Extension: WFL  Left rotation: 80 degrees   Right rotation: 60 degrees     Strength/Myotome Testing   Cervical Spine     Left   Normal strength    Right   Normal strength    Left Shoulder   Normal muscle strength    Cervical Flexibility Comments:   Tight UT/LS bilaterally         OCCULOMOTOR EXAM  EOM ROM: normal rene in all planes   Spontaneous Nystagmus:  normal  Gaze Induced Nystagmus: normal  Smooth Pursuit:                  Normal bilateral    Head Shaking Horizontal:  normal  Head Shaking Vertical:       normal     VESTIBULO-OCCULAR TESTS  VOR 1 (head only)              Produced dizziness (room spinning) "   VOR 2 (head and object)   Produced dizziness (room spinning)   VOR Cancellation               Negative   Head Thrust Test                Negative    POSITIONING TESTING  Vertebral artery screening: negative  [place]                           Negative bilateral, some dizziness to th left but no nystagmus and the dizziness was short-lived.     BALANCE TESTING  Single Leg Stance Right: Able to do 10 seconds but was unstable   Single Leg Stance Left: Able to do 10 seconds but was unstable       Cervicogenic Assessment:   Negative head rotation (10 sec)     Therapy Education/Self Care 81960   Education offered today POC, NOC, neck involvement with headaches, dizziness, and jaw.    Medbride Code    Ongoing HEP   UT stretch    Timed Minutes        Total Timed Treatment:     0   mins  Total Time of Visit:            45   mins    ASSESSMENT/PLAN      Goals                                          Progress Note due by 7/16/25                                                      Recert due by 9/13/2025   STG by: 2 Comments Date Status   Increase Cervical rotation to the Left to 80 deg      Decreased tenderness to UT/LS bilaterally       LTG by: 8      Reports dizziness to 2/10 or less when doing activities around the house      Reports neck pain to 2/10 or less       Able to walk through the community without loss of balance      Improve DHI test to 25 or less       Independent with HEP for progressive balance and stability      Anticipated CPT codes: Therapeutic Exercise 33148, Manual Therapy 04536, Therapeutic Activity 88872, Neuromuscular ReEducation 81263, Self Care/Home Management 09948, and Estim Attended 10448      Assessment & Plan       Assessment  Impairments: abnormal muscle tone, abnormal or restricted ROM, activity intolerance, lacks appropriate home exercise program and pain with function   Functional limitations: uncomfortable because of pain (Getting up from laying down, doing activities around the  house)  Assessment details: Patient reports to physical therapy with an MD order of dizziness and cervicalgia. She reports severe dizziness throughout the day that comes and goes with activity. She also complains of left sided jaw pain, headaches, and occasional hearing loss. She notices it the most when turning her head while doing activities and getting up from lying down. She reports that some days her dizziness will be all day long and all she can do is lay down until it does away. She also has left shoulder pain and is not currently working due to a mixture of her shoulder and dizziness. Upon assessment of her neck, her left transverse processes of her cervical spine were shifted to the left. She also had tightness/tenderness to the left upper trap/levator scapulae and her suboccipitals. BPPV testing was negative, but vestibular function tests indicate minimal-moderate vestibular hypofunction. Cervicogenic headache tests were negative. She will benefit from skilled physical therapy to address the listed impairments.   Prognosis: good    Plan  Therapy options: will be seen for skilled therapy services  Planned modality interventions: dry needling, low level laser therapy, TENS and traction  Planned therapy interventions: manual therapy, neuromuscular re-education, postural training, soft tissue mobilization, spinal/joint mobilization, strengthening, stretching, therapeutic activities, joint mobilization, home exercise program, functional ROM exercises and flexibility  Frequency: 2x week  Duration in weeks: 8  Treatment plan discussed with: patient  Plan details: Start with neck mobilizations/stretching to open up the left side. Then begin with vestibular training and strengthening in the newly gained ROM.        SIGNATURE: Isaak Phillips PT Student, [place] License #:   Electronically Signed on 6/16/2025    The clinical instructor and/or supervising staff, Berry Sevilla PT, was present in clinic guiding the visit  by approving, concurring, and confirming the skilled judgement for all services rendered.    Signature:  Berry Sevilla PT, [place] License #: [id number]  Electronically signed on 6/16/2025    Initial Certification  Certification Period: 6/16/2025 through 9/13/2025  I certify that the therapy services are furnished while this patient is under my care.  The services outlined above are required by this patient, and will be reviewed every 90 days.     PHYSICIAN: Alicia Polanco APRN (NPI: [id number])    Signature:_________________________________________DATE: _________    Please sign and return via fax to [phone].   [unfilled]          [address] [address]  [phone]
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
## 2025-06-18 ENCOUNTER — HOSPITAL ENCOUNTER (OUTPATIENT)
Dept: PREADMISSION TESTING | Age: 45
Discharge: HOME | End: 2025-06-22

## 2025-06-18 ENCOUNTER — OFFICE VISIT (OUTPATIENT)
Age: 45
End: 2025-06-18
Payer: MEDICAID

## 2025-06-18 VITALS — HEIGHT: 66 IN | BODY MASS INDEX: 29.09 KG/M2 | WEIGHT: 181 LBS

## 2025-06-18 DIAGNOSIS — M75.02 ADHESIVE CAPSULITIS OF LEFT SHOULDER: Primary | ICD-10-CM

## 2025-06-18 PROCEDURE — 99213 OFFICE O/P EST LOW 20 MIN: CPT | Performed by: PHYSICIAN ASSISTANT

## 2025-06-20 ENCOUNTER — OFFICE VISIT (OUTPATIENT)
Dept: INTERNAL MEDICINE | Facility: CLINIC | Age: 45
End: 2025-06-20
Payer: MEDICAID

## 2025-06-20 VITALS
BODY MASS INDEX: 29.63 KG/M2 | WEIGHT: 184.4 LBS | HEART RATE: 103 BPM | DIASTOLIC BLOOD PRESSURE: 83 MMHG | OXYGEN SATURATION: 98 % | HEIGHT: 66 IN | TEMPERATURE: 98.8 F | SYSTOLIC BLOOD PRESSURE: 132 MMHG

## 2025-06-20 DIAGNOSIS — M50.30 DDD (DEGENERATIVE DISC DISEASE), CERVICAL: ICD-10-CM

## 2025-06-20 DIAGNOSIS — R73.09 ELEVATED GLUCOSE: ICD-10-CM

## 2025-06-20 DIAGNOSIS — R42 DIZZINESS: Primary | ICD-10-CM

## 2025-06-20 DIAGNOSIS — M79.7 FIBROMYALGIA: ICD-10-CM

## 2025-06-20 DIAGNOSIS — R42 VERTIGO: ICD-10-CM

## 2025-06-20 PROCEDURE — 1126F AMNT PAIN NOTED NONE PRSNT: CPT | Performed by: INTERNAL MEDICINE

## 2025-06-20 PROCEDURE — 99214 OFFICE O/P EST MOD 30 MIN: CPT | Performed by: INTERNAL MEDICINE

## 2025-06-20 RX ORDER — GABAPENTIN 300 MG/1
300 CAPSULE ORAL 2 TIMES DAILY
Qty: 60 CAPSULE | Refills: 5 | Status: SHIPPED | OUTPATIENT
Start: 2025-07-14

## 2025-06-20 RX ORDER — DULOXETIN HYDROCHLORIDE 60 MG/1
1 CAPSULE, DELAYED RELEASE ORAL DAILY
COMMUNITY
Start: 2025-06-03

## 2025-06-20 NOTE — PROGRESS NOTES
Chief Complaint   Patient presents with    Follow-up    Dizziness         History:  Zena Zheng is a 45 y.o. female who presents today for evaluation of the above problems.      HPI  History of Present Illness  The patient presents for evaluation of dizziness, neuropathy, and blood glucose management.    She reports a decrease in vertigo and dizziness since she is not working currently. She continues to experience a sensation of fullness in her ears. An ENT specialist suggested that her symptoms may be related to her neck, which was found to have an indentation on one side and a slight protrusion on the other. She has been receiving physical therapy, which included bone manipulation and massage, resulting in some improvement. However, she still experiences occasional dizziness, which she attributes to clumsiness. Following a recent adjustment, she experienced dizziness, but this resolved after a nap. A CT scan of her neck in 12/2022 revealed mild arthritis changes in the cervical spine, degenerative disc disease, and scoliosis, which she believes may be contributing to her neck symptoms. She also recalls a period of jaw misalignment, which she could only bite down on one side and felt space on the other, that eventually led to dizziness. Her physical therapist believes these issues are interconnected and can be addressed.    She is currently taking gabapentin 300 mg twice daily for fibromyalgia, which she finds beneficial. She describes the sensation as similar to snake bites when not on the medication. She is due for a refill of gabapentin.    She has observed fluctuations in her blood sugar levels, with readings as high as 300 (after eating a lot of sugar) and as low as 61. She attributes these fluctuations to dietary habits, noting that the high reading occurred after consuming a significant amount of sugar and the low reading after a liquid breakfast. She has since purchased glucose shakes. She does not  regularly monitor her blood sugar but does so when feeling unwell. Subsequent readings have been within normal limits.    She is scheduled for a mammogram at [facility]. She is perimenopausal and is scheduled for an ablation procedure in 4 days to treat a fibroid. She will retain her ovaries.    She was previously under the care of a psychiatrist at [place], who prescribed Cymbalta, lamotrigine, and metoprolol for elevated heart rate, particularly during work hours. She discontinued metoprolol due to its excessive lowering of her heart rate.    FAMILY HISTORY  Her mother has severe osteoporosis and has had her bone replaced with a cadaver bone. Her grandmother also had osteoporosis.      ROS:  Review of Systems  See above    Current Outpatient Medications:     acetaminophen (Tylenol) 325 MG tablet, Take 3 tablets by mouth Every 8 (Eight) Hours. Take every 8 hours for 3 days then take prn as needed., Disp: , Rfl:     Azelastine HCl 137 MCG/SPRAY solution, SPRAY 1 TO 2 SPRAYS INTO EACH NOSTRIL TWICE A DAY AS NEEDED, Disp: , Rfl:     diclofenac (VOLTAREN) 75 MG EC tablet, Take 1 tablet by mouth Every 12 (Twelve) Hours., Disp: , Rfl:     DULoxetine (CYMBALTA) 60 MG capsule, Take 1 capsule by mouth Daily., Disp: , Rfl:     fluticasone (FLONASE) 50 MCG/ACT nasal spray, 1 spray by Each Nare route Daily., Disp: 15.8 mL, Rfl: 11    [START ON 7/14/2025] gabapentin (NEURONTIN) 300 MG capsule, Take 1 capsule by mouth 2 (Two) Times a Day., Disp: 60 capsule, Rfl: 5    lamoTRIgine (LaMICtal) 25 MG tablet, 2 TABLETS NIGHTLY, Disp: , Rfl:     loratadine (Claritin) 10 MG tablet, Take 1 tablet by mouth Daily., Disp: , Rfl:     meclizine (ANTIVERT) 25 MG tablet, Take 1 tablet by mouth 3 (Three) Times a Day As Needed for Dizziness., Disp: 60 tablet, Rfl: 1    mometasone (ELOCON) 0.1 % solution, , Disp: , Rfl:     tiZANidine (ZANAFLEX) 4 MG tablet, Take 1 tablet by mouth 2 (Two) Times a Day., Disp: 60 tablet, Rfl: 3    traZODone (DESYREL) 50  MG tablet, Take 1 tablet by mouth Every Night., Disp: , Rfl:     Lab Results   Component Value Date    GLUCOSE 100 (H) 04/23/2025    BUN 7 04/23/2025    CREATININE 0.69 04/23/2025     04/23/2025    K 3.4 (L) 04/23/2025     04/23/2025    CALCIUM 9.3 04/23/2025    PROTEINTOT 6.7 04/23/2025    ALBUMIN 4.2 04/23/2025    ALT 9 04/23/2025    AST 12 04/23/2025    ALKPHOS 61 04/23/2025    BILITOT 0.7 04/23/2025    GLOB 2.5 04/23/2025    AGRATIO 1.7 04/23/2025    BCR 10.1 04/23/2025    ANIONGAP 12.0 04/23/2025    EGFR 108.5 04/23/2025       WBC   Date Value Ref Range Status   04/23/2025 10.11 3.40 - 10.80 10*3/mm3 Final   02/21/2023 10.4 4.8 - 10.8 K/uL Final     RBC   Date Value Ref Range Status   04/23/2025 4.14 3.77 - 5.28 10*6/mm3 Final   02/21/2023 4.47 4.20 - 5.40 M/uL Final     Hemoglobin   Date Value Ref Range Status   04/23/2025 12.1 12.0 - 15.9 g/dL Final   02/21/2023 13.5 12.0 - 16.0 g/dL Final     Hematocrit   Date Value Ref Range Status   04/23/2025 38.2 34.0 - 46.6 % Final   02/21/2023 41.9 37.0 - 47.0 % Final     MCV   Date Value Ref Range Status   04/23/2025 92.3 79.0 - 97.0 fL Final   02/21/2023 93.7 81.0 - 99.0 fL Final     MCH   Date Value Ref Range Status   04/23/2025 29.2 26.6 - 33.0 pg Final   02/21/2023 30.2 27.0 - 31.0 pg Final     MCHC   Date Value Ref Range Status   04/23/2025 31.7 31.5 - 35.7 g/dL Final   02/21/2023 32.2 (L) 33.0 - 37.0 g/dL Final     RDW   Date Value Ref Range Status   04/23/2025 12.4 12.3 - 15.4 % Final   02/21/2023 13.2 11.5 - 14.5 % Final     RDW-SD   Date Value Ref Range Status   04/23/2025 41.8 37.0 - 54.0 fl Final     MPV   Date Value Ref Range Status   04/23/2025 10.1 6.0 - 12.0 fL Final   02/21/2023 10.4 9.4 - 12.3 fL Final     Platelets   Date Value Ref Range Status   04/23/2025 400 140 - 450 10*3/mm3 Final   02/21/2023 331 130 - 400 K/uL Final     Neutrophil Rel %   Date Value Ref Range Status   02/21/2023 67.7 (H) 50.0 - 65.0 % Final     Neutrophil %   Date  Value Ref Range Status   04/23/2025 60.5 42.7 - 76.0 % Final     Lymphocyte Rel %   Date Value Ref Range Status   02/21/2023 26.2 20.0 - 40.0 % Final     Lymphocyte %   Date Value Ref Range Status   04/23/2025 31.1 19.6 - 45.3 % Final     Monocyte Rel %   Date Value Ref Range Status   02/21/2023 5.4 0.0 - 10.0 % Final     Monocyte %   Date Value Ref Range Status   04/23/2025 7.3 5.0 - 12.0 % Final     Eosinophil Rel %   Date Value Ref Range Status   02/21/2023 0.1 0.0 - 5.0 % Final     Eosinophil %   Date Value Ref Range Status   04/23/2025 0.2 (L) 0.3 - 6.2 % Final     Basophil Rel %   Date Value Ref Range Status   02/21/2023 0.4 0.0 - 1.0 % Final     Basophil %   Date Value Ref Range Status   04/23/2025 0.5 0.0 - 1.5 % Final     Immature Grans %   Date Value Ref Range Status   04/23/2025 0.4 0.0 - 0.5 % Final     Neutrophils Absolute   Date Value Ref Range Status   02/21/2023 7.0 1.5 - 7.5 K/uL Final     Neutrophils, Absolute   Date Value Ref Range Status   04/23/2025 6.12 1.70 - 7.00 10*3/mm3 Final     Lymphocytes Absolute   Date Value Ref Range Status   02/21/2023 2.7 1.1 - 4.5 K/uL Final     Lymphocytes, Absolute   Date Value Ref Range Status   04/23/2025 3.14 (H) 0.70 - 3.10 10*3/mm3 Final     Monocytes Absolute   Date Value Ref Range Status   02/21/2023 0.60 0.00 - 0.90 K/uL Final     Monocytes, Absolute   Date Value Ref Range Status   04/23/2025 0.74 0.10 - 0.90 10*3/mm3 Final     Eosinophils Absolute   Date Value Ref Range Status   02/21/2023 0.00 0.00 - 0.60 K/uL Final     Eosinophils, Absolute   Date Value Ref Range Status   04/23/2025 0.02 0.00 - 0.40 10*3/mm3 Final     Basophils Absolute   Date Value Ref Range Status   02/21/2023 0.00 0.00 - 0.20 K/uL Final     Basophils, Absolute   Date Value Ref Range Status   04/23/2025 0.05 0.00 - 0.20 10*3/mm3 Final     Immature Grans, Absolute   Date Value Ref Range Status   04/23/2025 0.04 0.00 - 0.05 10*3/mm3 Final   02/21/2023 0.0 K/uL Final     nRBC   Date  "Value Ref Range Status   04/23/2025 0.0 0.0 - 0.2 /100 WBC Final         OBJECTIVE:  Visit Vitals  /83 (BP Location: Left arm, Patient Position: Sitting, Cuff Size: Adult)   Pulse 103   Temp 98.8 °F (37.1 °C) (Temporal)   Ht 167.6 cm (66\")   Wt 83.6 kg (184 lb 6.4 oz)   SpO2 98%   BMI 29.76 kg/m²      Physical Exam  Constitutional:       General: She is not in acute distress.     Appearance: She is well-developed. She is not diaphoretic.   HENT:      Head: Normocephalic and atraumatic.   Eyes:      Pupils: Pupils are equal, round, and reactive to light.   Neck:      Thyroid: No thyromegaly.      Trachea: Phonation normal.   Cardiovascular:      Rate and Rhythm: Normal rate.   Pulmonary:      Effort: No respiratory distress.   Skin:     Coloration: Skin is not pale.      Findings: No erythema.   Neurological:      Mental Status: She is alert.   Psychiatric:         Behavior: Behavior normal.         Thought Content: Thought content normal.         Judgment: Judgment normal.         Results  Labs   - A1c: 12/2024, 5.3%    Imaging   - CT of neck: 12/2022, Mild arthritis changes in the cervical spine and degenerative disk    Assessment/Plan      Diagnoses and all orders for this visit:    1. Dizziness (Primary)    2. Vertigo    3. DDD (degenerative disc disease), cervical    4. Fibromyalgia  -     gabapentin (NEURONTIN) 300 MG capsule; Take 1 capsule by mouth 2 (Two) Times a Day.  Dispense: 60 capsule; Refill: 5      Assessment & Plan  1. Dizziness.  - Dizziness has improved with physical therapy and not working.  - Occasional dizziness reported after adjustments, but resolves after resting.  - ENT evaluation suggested the dizziness may be related to neck issues.  - Advised to continue physical therapy and follow up with ENT in 07/2025.    2.  Fibromyalgia.  - Gabapentin 300 mg twice a day is effective in managing neuropathy symptoms.  - Patient reports significant relief from symptoms with gabapentin.  - Last " refill was on 06/14/2025, and a new prescription for gabapentin has been provided.  - Advised to ensure adequate refills and monitor symptom control.    3.  Elevated glucose  - Blood glucose levels have fluctuated, with occasional highs (up to 400) and lows (as low as 61).  - Last A1c was 5.3% in 12/2024, within the normal range.  - Advised to monitor blood sugar levels regularly and maintain a balanced diet.  - Discussed the importance of avoiding excessive sugar intake.    4. Health maintenance.  - Due for a mammogram in 5 days, which is already scheduled.  - Advised to have the mammogram results faxed to this office for review.  - Discussed the importance of regular screenings and follow-up.      Return in about 6 months (around 12/20/2025) for Annual physical.      GILLIAN Nichole MD  15:01 CDT  6/20/2025   Electronically signed    Patient or patient representative verbalized consent for the use of Ambient Listening during the visit with  DARIO Nichole MD for chart documentation. 6/20/2025  15:10 CDT

## 2025-06-24 ENCOUNTER — ANESTHESIA EVENT (OUTPATIENT)
Dept: OPERATING ROOM | Age: 45
End: 2025-06-24
Payer: MEDICAID

## 2025-06-24 ENCOUNTER — ANESTHESIA (OUTPATIENT)
Dept: OPERATING ROOM | Age: 45
End: 2025-06-24
Payer: MEDICAID

## 2025-06-24 ENCOUNTER — HOSPITAL ENCOUNTER (OUTPATIENT)
Age: 45
Setting detail: OUTPATIENT SURGERY
Discharge: HOME | End: 2025-06-24
Attending: OBSTETRICS & GYNECOLOGY | Admitting: OBSTETRICS & GYNECOLOGY
Payer: MEDICAID

## 2025-06-24 VITALS
HEIGHT: 67 IN | DIASTOLIC BLOOD PRESSURE: 76 MMHG | TEMPERATURE: 97 F | WEIGHT: 180 LBS | OXYGEN SATURATION: 100 % | BODY MASS INDEX: 28.25 KG/M2 | HEART RATE: 80 BPM | RESPIRATION RATE: 12 BRPM | SYSTOLIC BLOOD PRESSURE: 112 MMHG

## 2025-06-24 DIAGNOSIS — R10.2 PELVIC PAIN SYNDROME: ICD-10-CM

## 2025-06-24 DIAGNOSIS — D25.1 INTRAMURAL AND SUBMUCOUS LEIOMYOMA OF UTERUS: Primary | ICD-10-CM

## 2025-06-24 DIAGNOSIS — R93.89 ABNORMAL RADIOLOGICAL FINDINGS IN SKIN AND SUBCUTANEOUS TISSUE: ICD-10-CM

## 2025-06-24 DIAGNOSIS — N93.8 DYSFUNCTIONAL UTERINE BLEEDING: ICD-10-CM

## 2025-06-24 DIAGNOSIS — D25.0 INTRAMURAL AND SUBMUCOUS LEIOMYOMA OF UTERUS: Primary | ICD-10-CM

## 2025-06-24 PROBLEM — N92.1 MENOMETRORRHAGIA: Status: ACTIVE | Noted: 2025-06-24

## 2025-06-24 LAB
HCG, URINE, POC: NEGATIVE
Lab: NORMAL
NEGATIVE QC PASS/FAIL: NORMAL
POSITIVE QC PASS/FAIL: NORMAL

## 2025-06-24 PROCEDURE — 6360000002 HC RX W HCPCS: Performed by: NURSE ANESTHETIST, CERTIFIED REGISTERED

## 2025-06-24 PROCEDURE — 2720000010 HC SURG SUPPLY STERILE: Performed by: OBSTETRICS & GYNECOLOGY

## 2025-06-24 PROCEDURE — 3600000006 HC SURGERY LEVEL 6 BASE: Performed by: OBSTETRICS & GYNECOLOGY

## 2025-06-24 PROCEDURE — 88305 TISSUE EXAM BY PATHOLOGIST: CPT

## 2025-06-24 PROCEDURE — 6360000002 HC RX W HCPCS: Performed by: OBSTETRICS & GYNECOLOGY

## 2025-06-24 PROCEDURE — C1886 CATHETER, ABLATION: HCPCS | Performed by: OBSTETRICS & GYNECOLOGY

## 2025-06-24 PROCEDURE — 2580000003 HC RX 258: Performed by: OBSTETRICS & GYNECOLOGY

## 2025-06-24 PROCEDURE — 2500000003 HC RX 250 WO HCPCS: Performed by: OBSTETRICS & GYNECOLOGY

## 2025-06-24 PROCEDURE — 2500000003 HC RX 250 WO HCPCS: Performed by: ANESTHESIOLOGY

## 2025-06-24 PROCEDURE — 2709999900 HC NON-CHARGEABLE SUPPLY: Performed by: OBSTETRICS & GYNECOLOGY

## 2025-06-24 PROCEDURE — 7100000000 HC PACU RECOVERY - FIRST 15 MIN: Performed by: OBSTETRICS & GYNECOLOGY

## 2025-06-24 PROCEDURE — 3700000001 HC ADD 15 MINUTES (ANESTHESIA): Performed by: OBSTETRICS & GYNECOLOGY

## 2025-06-24 PROCEDURE — 6360000002 HC RX W HCPCS: Performed by: ANESTHESIOLOGY

## 2025-06-24 PROCEDURE — 7100000010 HC PHASE II RECOVERY - FIRST 15 MIN: Performed by: OBSTETRICS & GYNECOLOGY

## 2025-06-24 PROCEDURE — 3700000000 HC ANESTHESIA ATTENDED CARE: Performed by: OBSTETRICS & GYNECOLOGY

## 2025-06-24 PROCEDURE — 6370000000 HC RX 637 (ALT 250 FOR IP): Performed by: ANESTHESIOLOGY

## 2025-06-24 PROCEDURE — 3600000016 HC SURGERY LEVEL 6 ADDTL 15MIN: Performed by: OBSTETRICS & GYNECOLOGY

## 2025-06-24 PROCEDURE — 2500000003 HC RX 250 WO HCPCS: Performed by: NURSE ANESTHETIST, CERTIFIED REGISTERED

## 2025-06-24 PROCEDURE — 88305 TISSUE EXAM BY PATHOLOGIST: CPT | Performed by: PATHOLOGY

## 2025-06-24 PROCEDURE — 7100000001 HC PACU RECOVERY - ADDTL 15 MIN: Performed by: OBSTETRICS & GYNECOLOGY

## 2025-06-24 PROCEDURE — 7100000011 HC PHASE II RECOVERY - ADDTL 15 MIN: Performed by: OBSTETRICS & GYNECOLOGY

## 2025-06-24 RX ORDER — SODIUM CHLORIDE 9 MG/ML
INJECTION, SOLUTION INTRAVENOUS PRN
Status: DISCONTINUED | OUTPATIENT
Start: 2025-06-24 | End: 2025-06-24 | Stop reason: HOSPADM

## 2025-06-24 RX ORDER — IBUPROFEN 800 MG/1
800 TABLET, FILM COATED ORAL EVERY 8 HOURS PRN
Qty: 60 TABLET | Refills: 0 | Status: SHIPPED | OUTPATIENT
Start: 2025-06-24

## 2025-06-24 RX ORDER — DEXMEDETOMIDINE HYDROCHLORIDE 100 UG/ML
INJECTION, SOLUTION INTRAVENOUS
Status: DISCONTINUED | OUTPATIENT
Start: 2025-06-24 | End: 2025-06-24 | Stop reason: SDUPTHER

## 2025-06-24 RX ORDER — SODIUM CHLORIDE, SODIUM LACTATE, POTASSIUM CHLORIDE, CALCIUM CHLORIDE 600; 310; 30; 20 MG/100ML; MG/100ML; MG/100ML; MG/100ML
INJECTION, SOLUTION INTRAVENOUS CONTINUOUS
Status: DISCONTINUED | OUTPATIENT
Start: 2025-06-24 | End: 2025-06-24 | Stop reason: HOSPADM

## 2025-06-24 RX ORDER — DIPHENHYDRAMINE HYDROCHLORIDE 50 MG/ML
12.5 INJECTION, SOLUTION INTRAMUSCULAR; INTRAVENOUS
Status: DISCONTINUED | OUTPATIENT
Start: 2025-06-24 | End: 2025-06-24 | Stop reason: HOSPADM

## 2025-06-24 RX ORDER — FENTANYL CITRATE 50 UG/ML
INJECTION, SOLUTION INTRAMUSCULAR; INTRAVENOUS
Status: DISCONTINUED | OUTPATIENT
Start: 2025-06-24 | End: 2025-06-24 | Stop reason: SDUPTHER

## 2025-06-24 RX ORDER — SODIUM CHLORIDE 0.9 % (FLUSH) 0.9 %
5-40 SYRINGE (ML) INJECTION EVERY 12 HOURS SCHEDULED
Status: DISCONTINUED | OUTPATIENT
Start: 2025-06-24 | End: 2025-06-24 | Stop reason: HOSPADM

## 2025-06-24 RX ORDER — PROCHLORPERAZINE EDISYLATE 5 MG/ML
5 INJECTION INTRAMUSCULAR; INTRAVENOUS
Status: DISCONTINUED | OUTPATIENT
Start: 2025-06-24 | End: 2025-06-24 | Stop reason: HOSPADM

## 2025-06-24 RX ORDER — FENTANYL CITRATE 50 UG/ML
25 INJECTION, SOLUTION INTRAMUSCULAR; INTRAVENOUS EVERY 5 MIN PRN
Status: DISCONTINUED | OUTPATIENT
Start: 2025-06-24 | End: 2025-06-24 | Stop reason: HOSPADM

## 2025-06-24 RX ORDER — NALOXONE HYDROCHLORIDE 0.4 MG/ML
INJECTION, SOLUTION INTRAMUSCULAR; INTRAVENOUS; SUBCUTANEOUS PRN
Status: DISCONTINUED | OUTPATIENT
Start: 2025-06-24 | End: 2025-06-24 | Stop reason: HOSPADM

## 2025-06-24 RX ORDER — ONDANSETRON 2 MG/ML
INJECTION INTRAMUSCULAR; INTRAVENOUS
Status: DISCONTINUED | OUTPATIENT
Start: 2025-06-24 | End: 2025-06-24 | Stop reason: SDUPTHER

## 2025-06-24 RX ORDER — ACETAMINOPHEN AND CODEINE PHOSPHATE 300; 30 MG/1; MG/1
1 TABLET ORAL EVERY 6 HOURS PRN
Qty: 20 TABLET | Refills: 0 | Status: SHIPPED | OUTPATIENT
Start: 2025-06-24 | End: 2025-06-29

## 2025-06-24 RX ORDER — ONDANSETRON 2 MG/ML
4 INJECTION INTRAMUSCULAR; INTRAVENOUS
Status: DISCONTINUED | OUTPATIENT
Start: 2025-06-24 | End: 2025-06-24 | Stop reason: HOSPADM

## 2025-06-24 RX ORDER — MIDAZOLAM HYDROCHLORIDE 2 MG/2ML
2 INJECTION, SOLUTION INTRAMUSCULAR; INTRAVENOUS
Status: DISCONTINUED | OUTPATIENT
Start: 2025-06-24 | End: 2025-06-24 | Stop reason: HOSPADM

## 2025-06-24 RX ORDER — DEXAMETHASONE SODIUM PHOSPHATE 4 MG/ML
4 INJECTION, SOLUTION INTRA-ARTICULAR; INTRALESIONAL; INTRAMUSCULAR; INTRAVENOUS; SOFT TISSUE ONCE
Status: COMPLETED | OUTPATIENT
Start: 2025-06-24 | End: 2025-06-24

## 2025-06-24 RX ORDER — SODIUM CHLORIDE 0.9 % (FLUSH) 0.9 %
5-40 SYRINGE (ML) INJECTION PRN
Status: DISCONTINUED | OUTPATIENT
Start: 2025-06-24 | End: 2025-06-24 | Stop reason: HOSPADM

## 2025-06-24 RX ORDER — LIDOCAINE HYDROCHLORIDE 10 MG/ML
INJECTION, SOLUTION INFILTRATION; PERINEURAL
Status: DISCONTINUED | OUTPATIENT
Start: 2025-06-24 | End: 2025-06-24 | Stop reason: SDUPTHER

## 2025-06-24 RX ORDER — APREPITANT 40 MG/1
40 CAPSULE ORAL ONCE
Status: COMPLETED | OUTPATIENT
Start: 2025-06-24 | End: 2025-06-24

## 2025-06-24 RX ORDER — LIDOCAINE HYDROCHLORIDE 10 MG/ML
1 INJECTION, SOLUTION EPIDURAL; INFILTRATION; INTRACAUDAL; PERINEURAL
Status: DISCONTINUED | OUTPATIENT
Start: 2025-06-24 | End: 2025-06-24 | Stop reason: HOSPADM

## 2025-06-24 RX ORDER — PROPOFOL 10 MG/ML
INJECTION, EMULSION INTRAVENOUS
Status: DISCONTINUED | OUTPATIENT
Start: 2025-06-24 | End: 2025-06-24 | Stop reason: SDUPTHER

## 2025-06-24 RX ORDER — SCOPOLAMINE 1 MG/3D
1 PATCH, EXTENDED RELEASE TRANSDERMAL
Status: DISCONTINUED | OUTPATIENT
Start: 2025-06-24 | End: 2025-06-24 | Stop reason: HOSPADM

## 2025-06-24 RX ORDER — MIDAZOLAM HYDROCHLORIDE 1 MG/ML
INJECTION, SOLUTION INTRAMUSCULAR; INTRAVENOUS
Status: DISCONTINUED | OUTPATIENT
Start: 2025-06-24 | End: 2025-06-24 | Stop reason: SDUPTHER

## 2025-06-24 RX ORDER — FENTANYL CITRATE 50 UG/ML
50 INJECTION, SOLUTION INTRAMUSCULAR; INTRAVENOUS EVERY 5 MIN PRN
Status: DISCONTINUED | OUTPATIENT
Start: 2025-06-24 | End: 2025-06-24 | Stop reason: HOSPADM

## 2025-06-24 RX ADMIN — CEFAZOLIN 2000 MG: 1 INJECTION, POWDER, FOR SOLUTION INTRAMUSCULAR; INTRAVENOUS at 08:13

## 2025-06-24 RX ADMIN — LIDOCAINE HYDROCHLORIDE 50 MG: 10 INJECTION, SOLUTION INFILTRATION; PERINEURAL at 08:08

## 2025-06-24 RX ADMIN — MIDAZOLAM 2 MG: 1 INJECTION INTRAMUSCULAR; INTRAVENOUS at 08:03

## 2025-06-24 RX ADMIN — FENTANYL CITRATE 50 MCG: 0.05 INJECTION, SOLUTION INTRAMUSCULAR; INTRAVENOUS at 08:33

## 2025-06-24 RX ADMIN — SODIUM CHLORIDE, SODIUM LACTATE, POTASSIUM CHLORIDE, AND CALCIUM CHLORIDE: 600; 310; 30; 20 INJECTION, SOLUTION INTRAVENOUS at 08:05

## 2025-06-24 RX ADMIN — PROPOFOL 200 MG: 10 INJECTION, EMULSION INTRAVENOUS at 08:08

## 2025-06-24 RX ADMIN — DEXMEDETOMIDINE HYDROCHLORIDE 5 MCG: 100 INJECTION, SOLUTION, CONCENTRATE INTRAVENOUS at 08:12

## 2025-06-24 RX ADMIN — HYDROMORPHONE HYDROCHLORIDE 0.5 MG: 1 INJECTION, SOLUTION INTRAMUSCULAR; INTRAVENOUS; SUBCUTANEOUS at 09:13

## 2025-06-24 RX ADMIN — SODIUM CHLORIDE, PRESERVATIVE FREE 20 MG: 5 INJECTION INTRAVENOUS at 07:29

## 2025-06-24 RX ADMIN — FENTANYL CITRATE 50 MCG: 0.05 INJECTION, SOLUTION INTRAMUSCULAR; INTRAVENOUS at 08:08

## 2025-06-24 RX ADMIN — PROPOFOL 50 MG: 10 INJECTION, EMULSION INTRAVENOUS at 08:10

## 2025-06-24 RX ADMIN — DEXMEDETOMIDINE HYDROCHLORIDE 5 MCG: 100 INJECTION, SOLUTION, CONCENTRATE INTRAVENOUS at 09:08

## 2025-06-24 RX ADMIN — ONDANSETRON 4 MG: 2 INJECTION INTRAMUSCULAR; INTRAVENOUS at 08:50

## 2025-06-24 RX ADMIN — APREPITANT 40 MG: 40 CAPSULE ORAL at 07:29

## 2025-06-24 RX ADMIN — DEXAMETHASONE SODIUM PHOSPHATE 4 MG: 4 INJECTION INTRA-ARTICULAR; INTRALESIONAL; INTRAMUSCULAR; INTRAVENOUS; SOFT TISSUE at 07:29

## 2025-06-24 ASSESSMENT — ENCOUNTER SYMPTOMS
EYES NEGATIVE: 1
GASTROINTESTINAL NEGATIVE: 1
RESPIRATORY NEGATIVE: 1
ALLERGIC/IMMUNOLOGIC NEGATIVE: 1

## 2025-06-24 ASSESSMENT — PAIN - FUNCTIONAL ASSESSMENT
PAIN_FUNCTIONAL_ASSESSMENT: 0-10

## 2025-06-24 NOTE — DISCHARGE INSTRUCTIONS
DISCHARGE INSTRUCTIONS FOLLOWING HYSTEROSCOPY/D&C/SONATA FIRBROID ABLATION    Discomfort:   Following surgery there usually is a little discomfort related to the procedure itself.  Cramping similar to that during a menstrual period may occur and last for 1-3 days afterwards.   Please take Ibuprofen (Motrin or Advil) 600 mg every 6 hours around the clock for the first 72 hours.  You may also alternate Tylenol.   Since the bladder is emptied by catheterization during surgery, there may be some discomfort during urination temporarily.  Please report sever or persistent pain as well as any fever over 100.5 degrees or chills.    Bleeding:   The bleeding following surgery usually persists about seven to ten days, but occasionally somewhat longer.  As long as the flow is not heavy, there usually is no need for alam.  Sanitary pads are preferred for the first 5 days.  Tampons may be used afterwards.    Bathing:   Showers are preferred following the procedure.  After 2-3 days, you may resume tub bathing in there is minimal bleeding.    Tina:   Do not resume intercourse for two weeks as this may lead to infections.    Douching:   As a general rule, douching is not a recommended health practice.    Follow Up:   A typical follow up appointment is scheduled for 4-6 weeks.  If you are doing well, we can schedule a phone visit to discuss pathology and next steps.     Hysteroscopy With Dilation and Curettage: What to Expect at Home  Your Recovery     For a hysteroscopy, your doctor guides a lighted tube through your cervix and into your uterus. This helps the doctor see inside your uterus.  For a dilation and curettage (D&C), your doctor uses a curved tool, called a curette, to gently scrape tissue from your uterus.  After these procedures, you are likely to have a backache or cramps similar to menstrual cramps. Expect to pass small clots of blood from your vagina for the first few days. You may have light vaginal bleeding

## 2025-06-24 NOTE — OP NOTE
Operative Note      Patient: Roxann Colindres  YOB: 1980  MRN: [id number]    Date of Procedure: 6/24/2025    Pre-Op Diagnosis Codes:      * Abnormal radiological findings in skin and subcutaneous tissue [R93.89]     * Dysfunctional uterine bleeding [N93.8]     * Pelvic pain syndrome [R10.2]    Post-Op Diagnosis: Same       Procedure(s):  HYSTEROSCOPY MYOMECTOMY NOVASURE ENDOMETRIAL ABLATION, DILATION AND CURETTAGE SONATA  SONATA    Surgeon(s):  Letitia Barr MD    Assistant:   First Assistant: Chica Boss    Anesthesia: General    Estimated Blood Loss (mL): Minimal    Complications: None    Specimens:   ID Type Source Tests Collected by Time Destination   A : Endometrial tissue and fibroid Tissue Uterus SURGICAL PATHOLOGY Letitia Barr MD 6/24/2025 0855        Implants:  * No implants in log *      Drains: * No LDAs found *    Findings:  Infection Present At Time Of Surgery (PATOS) (choose all levels that have infection present):  No infection present  Other Findings: Enlarged uterus with 2 myomas on sonographic exam.  One is Type 2 and the other type 2-5.  Markedly thickened endometrium and submucosal myoma noted on hysteroscopy.  Cavity length 6, width 4.6.      Detailed Description of Procedure:   The patient was taken the operating room and placed under general anesthesia.  She was positioned in dorsal lithotomy, prepped and draped in usual sterile fashion.  Vaginal retractors were placed and the anterior lip of the cervix was grasped with a single tooth tenaculum.  The os was serially dilated to an 18 Donavon dilator.  The Sonata handpiece was then easily inserted and a complete uterine survey was performed with the ultrasound.  There were noted to be 2 fibroids, each identified as appropriate for treatment.  A graphical overlay was placed to target each ablation zone.  The trocar tip was introduced.  Care was taken to ensure that the guide was within the serosal boundary and
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
## 2025-06-24 NOTE — ANESTHESIA POSTPROCEDURE EVALUATION
Department of Anesthesiology  Postprocedure Note    Patient: Roxann Colindres  MRN: [id number]  YOB: 1980  Date of evaluation: 6/24/2025    Procedure Summary       Date: 06/24/25 Room / Location: [address]    Anesthesia Start: 0805 Anesthesia Stop: 0921    Procedures:       HYSTEROSCOPY MYOMECTOMY NOVASURE ENDOMETRIAL ABLATION, DILATION AND CURETTAGE SONATA (Uterus)      SONATA (Uterus) Diagnosis:       Abnormal radiological findings in skin and subcutaneous tissue      Dysfunctional uterine bleeding      Pelvic pain syndrome      (Abnormal radiological findings in skin and subcutaneous tissue [R93.89])      (Dysfunctional uterine bleeding [N93.8])      (Pelvic pain syndrome [R10.2])    Surgeons: Letitia Barr MD Responsible Provider: Emiliano Marin APRN - CRNA    Anesthesia Type: general ASA Status: 2            Anesthesia Type: No value filed.    Aurora Phase I: Aurora Score: 10    Aurora Phase II:      Anesthesia Post Evaluation    Patient location during evaluation: PACU  Patient participation: complete - patient participated  Level of consciousness: sleepy but conscious  Pain score: 0  Airway patency: patent  Nausea & Vomiting: no nausea and no vomiting  Cardiovascular status: hemodynamically stable  Respiratory status: acceptable and spontaneous ventilation  Hydration status: euvolemic  Pain management: adequate    No notable events documented.

## 2025-06-24 NOTE — PROGRESS NOTES
Patient was advised to check with provider before taking voltaren with Ibuprofen. She said she would take her voltarten with her newly prescribed pain medication and if she feels she needs the ibuprofen will call provider to discuss.

## 2025-06-24 NOTE — PROGRESS NOTES
CLINICAL PHARMACY NOTE: MEDS TO BEDS    Total # of Prescriptions Filled: 2   The following medications were delivered to the patient:  Discharge Medication List as of 6/24/2025 10:08 AM        START taking these medications    Details   ibuprofen (ADVIL;MOTRIN) 800 MG tablet Take 1 tablet by mouth every 8 hours as needed for Pain, Disp-60 tablet, R-0Normal      acetaminophen-codeine (TYLENOL #3) 300-30 MG per tablet Take 1 tablet by mouth every 6 hours as needed for Pain for up to 5 days. Max Daily Amount: 4 tablets, Disp-20 tablet, R-0Normal               Additional Documentation:    Patient picked up curbside at pharmacy. No copay.

## 2025-06-24 NOTE — ANESTHESIA PRE PROCEDURE
Department of Anesthesiology  Preprocedure Note       Name:  Roxann Colindres   Age:  45 y.o.  :  1980                                          MRN:  [id number]         Date:  2025      Surgeon: Surgeon(s):  Letitia Barr MD    Procedure: Procedure(s):  HYSTEROSCOPY NOVASURE ENDOMETRIAL ABLATION, DILATION AND CURETTAGE  SONATA    Medications prior to admission:   Prior to Admission medications    Medication Sig Start Date End Date Taking? Authorizing Provider   DULoxetine (CYMBALTA) 60 MG extended release capsule Take 1 capsule by mouth daily 6/3/25 11/30/25 Yes Srinath Linn APRN - CNP   lamoTRIgine (LAMICTAL) 100 MG tablet Take 1 tablet by mouth daily 6/3/25 2/28/26 Yes Srinath Linn APRN - CNP   meclizine (ANTIVERT) 25 MG tablet Take 1 tablet by mouth 3 times daily as needed 3/19/25  Yes Maurice Toure MD   traZODone (DESYREL) 50 MG tablet Take 1 tablet by mouth nightly as needed for Sleep 25 Yes Li Correia MD   diclofenac (VOLTAREN) 75 MG EC tablet Take 1 tablet by mouth 2 times daily 25  Yes Luis Tidwell PA   fluticasone (FLONASE) 50 MCG/ACT nasal spray 2 sprays by Nasal route daily 24  Yes ProviderMaurice MD   NONFORMULARY Pt states that there are 2 more meds she takes name unknown for acne and rashs   Yes Maurice Toure MD   tiZANidine (ZANAFLEX) 4 MG tablet Take 1 tablet by mouth 2 times daily   Yes Maurice Toure MD   ondansetron (ZOFRAN) 4 MG tablet Every 8 (Eight) Hours. 23  Yes Maurice Toure MD   gabapentin (NEURONTIN) 300 MG capsule Take 1 capsule by mouth in the morning and at bedtime. 22  Yes Maurice Toure MD   Multiple Vitamins-Minerals (CENTRUM SILVER ULTRA WOMENS PO) Take 1 tablet by mouth daily   Yes Maurice Toure MD   miSOPROStol (CYTOTEC) 200 MCG tablet Place 1 tablet vaginally once for 1 dose At bedtime the night before surgery 25  Letitia Barr MD

## 2025-06-26 ENCOUNTER — HOSPITAL ENCOUNTER (OUTPATIENT)
Dept: PHYSICAL THERAPY | Facility: HOSPITAL | Age: 45
Setting detail: THERAPIES SERIES
Discharge: HOME | End: 2025-06-26
Payer: MEDICAID

## 2025-06-26 DIAGNOSIS — M54.2 CERVICALGIA: ICD-10-CM

## 2025-06-26 DIAGNOSIS — R51.9 BILATERAL HEADACHES: ICD-10-CM

## 2025-06-26 DIAGNOSIS — R42 DIZZINESS: Primary | ICD-10-CM

## 2025-06-26 PROCEDURE — 97140 MANUAL THERAPY 1/> REGIONS: CPT | Performed by: PHYSICAL THERAPIST

## 2025-06-26 NOTE — THERAPY TREATMENT NOTE
Physical Therapy Treatment Note  [facility]patient Therapy Services  A department [address], [place] [address]    Patient: Zena Zheng                                                 Visit Date: 2025  :     1980    Referring practitioner:    JN Rose*  Date of Initial Visit:          Type: THERAPY  Episode: Dizziness; Cervicalgia  Number of visits this episode: 2    Visit Diagnoses:    ICD-10-CM ICD-9-CM   1. Dizziness  R42 780.4   2. Cervicalgia  M54.2 723.1   3. Bilateral headaches  R51.9 784.0     SUBJECTIVE     Subjective:  She says she is a little dizzy today. She feels a bit more clumsy today.     PAIN: 0/10 of neck and HA, c/o tight         OBJECTIVE     Objective       Manual Therapy     32896  Comments   Prone thoracic ext mob Gr 3   Prone uni C1 PA mobs makr Gr 3 sustained   Supine subocc release and man cerv traction    Mark AA rotation mob        Timed Minutes 45          Therapy Education/Self Care 68052   Education offered today Emphasis on scapular retraction   Medbride Code    Ongoing HEP   UT stretch   Scapular/cervical retraction   Timed Minutes        Total Timed Treatment:     45   mins  Total Time of Visit:            45   mins    ASSESSMENT/PLAN      Goals                                          Progress Note due by 25                                                      Recert due by 2025   STG by: 2 Comments Date Status   Increase Cervical rotation to the Left to 80 deg      Decreased tenderness to UT/LS bilaterally       LTG by: 8      Reports dizziness to 2/10 or less when doing activities around the house      Reports neck pain to 2/10 or less  0/10 now, mostly tight  ongoing   Able to walk through the community without loss of balance      Improve DHI test to 25 or less       Independent with HEP for progressive balance and stability      Anticipated CPT codes: Therapeutic Exercise 86926, Manual Therapy  09599, Therapeutic Activity 41520, Neuromuscular ReEducation 87562, Self Care/Home Management 67917, and Estim Attended 45722  Assessment/Plan     ASSESSMENT:   Today was her first visit after her eval. I focused on her neck more today to help her headaches and possible cervicogenic vertigo.     PLAN:   Cont with manual therapy and postural/cervical stability. Pursue VOR training.     SIGNATURE: Berry Sevilla, PT, [place] License #: [id number]  Electronically Signed on 6/26/2025        [address] [place] [address]. [address]  [phone]

## 2025-06-27 ENCOUNTER — RESULTS FOLLOW-UP (OUTPATIENT)
Dept: OBGYN CLINIC | Age: 45
End: 2025-06-27

## 2025-06-27 ENCOUNTER — HOSPITAL ENCOUNTER (OUTPATIENT)
Dept: PHYSICAL THERAPY | Facility: HOSPITAL | Age: 45
Setting detail: THERAPIES SERIES
Discharge: HOME | End: 2025-06-27
Payer: MEDICAID

## 2025-06-27 DIAGNOSIS — R51.9 BILATERAL HEADACHES: ICD-10-CM

## 2025-06-27 DIAGNOSIS — R42 DIZZINESS: Primary | ICD-10-CM

## 2025-06-27 DIAGNOSIS — M54.2 CERVICALGIA: ICD-10-CM

## 2025-06-27 PROCEDURE — 97140 MANUAL THERAPY 1/> REGIONS: CPT

## 2025-06-27 PROCEDURE — 97110 THERAPEUTIC EXERCISES: CPT

## 2025-06-27 NOTE — THERAPY TREATMENT NOTE
Physical Therapy Treatment Note  [facility] Therapy [facility]  A department [address] [place], [place] [address]    Patient: Zena Zheng                                                 Visit Date: 2025  :     1980    Referring practitioner:    JN Rose*  Date of Initial Visit:          Type: THERAPY  Episode: Dizziness; Cervicalgia  Number of visits this episode: 3    Visit Diagnoses:    ICD-10-CM ICD-9-CM   1. Dizziness  R42 780.4   2. Cervicalgia  M54.2 723.1   3. Bilateral headaches  R51.9 784.0     SUBJECTIVE     Subjective: Left side of neck is tender. She had some headaches yesterday.     PAIN: 0/10 neck; c/o tight and tenderness today.           OBJECTIVE     Objective       Manual Therapy     07761  Comments   Prone thoracic ext mob Gr 3   Prone uni C1 PA mobs rene Gr 3 sustained   Supine subocc release and man cerv traction    UT/LS stretches     Down glides to left and up glides to right cervical     Timed Minutes 25      Therapeutic Exercises    37547 Units Comments   I, Y, T  1x 10 each  4 sec hold. Had difficulty with UT over activation    Rows and pulldowns with GTB  2 x 15 each  Set up chin tuck and core activation before reps    Chest stretch on half roll  3 mins  Left shoulder hurt so had it lower than the right              Timed Minutes 16           Therapy Education/Self Care 53210   Education offered today Emphasis on scapular retraction   Medbride Code    Ongoing HEP   UT stretch   Scapular/cervical retraction   Timed Minutes        Total Timed Treatment:     41   mins  Total Time of Visit:            41   mins    ASSESSMENT/PLAN      Goals                                          Progress Note due by 25                                                      Recert due by 2025   STG by: 2 Comments Date Status   Increase Cervical rotation to the Left to 80 deg      Decreased tenderness to UT/LS bilaterally  Tender to  left but is decreasing  6/27  Ongoing    LTG by: 8      Reports dizziness to 2/10 or less when doing activities around the house      Reports neck pain to 2/10 or less  0/10 pain, Has had headaches and stiffness but no neck pain  6/27 ongoing   Able to walk through the community without loss of balance      Improve DHI test to 25 or less       Independent with HEP for progressive balance and stability      Anticipated CPT codes: Therapeutic Exercise 26036, Manual Therapy 71108, Therapeutic Activity 94372, Neuromuscular ReEducation 51030, Self Care/Home Management 34771, and Estim Attended 88020  Assessment/Plan     ASSESSMENT:   Pt reports that she is feeling better than before she started, although she is still having occasional headaches. She has not felt dizzy today. Therapy is mainly consisting of mobilizing the cervical spine and reversing the hyperkyphosis at the CT junction. We started back strengthening and stabilization exercises today which were tolerated well. She is in PT for her left shoulder as well so some exercises may have to be diverted until that feels better.     PLAN:   Cont with manual therapy and postural/cervical stability. Pursue VOR training.     SIGNATURE: Isaak Phillips PT Student, [place] License #:   Electronically Signed on 6/27/2025    The clinical instructor and/or supervising staff, Berry Sevilla PT, was present in clinic guiding the visit by approving, concurring, and confirming the skilled judgement for all services rendered.    Signature:  Berry Sevilla PT, [place] License #: [id number]  Electronically signed on 6/27/2025        [place]antha [place]  [place] [address]  [phone]

## 2025-06-30 ENCOUNTER — HOSPITAL ENCOUNTER (OUTPATIENT)
Dept: PHYSICAL THERAPY | Facility: HOSPITAL | Age: 45
Setting detail: THERAPIES SERIES
Discharge: HOME | End: 2025-06-30
Payer: MEDICAID

## 2025-06-30 ENCOUNTER — TELEPHONE (OUTPATIENT)
Dept: PSYCHIATRY | Age: 45
End: 2025-06-30

## 2025-06-30 DIAGNOSIS — R42 DIZZINESS: Primary | ICD-10-CM

## 2025-06-30 DIAGNOSIS — R51.9 BILATERAL HEADACHES: ICD-10-CM

## 2025-06-30 DIAGNOSIS — S46.912A LEFT SHOULDER STRAIN, INITIAL ENCOUNTER: ICD-10-CM

## 2025-06-30 DIAGNOSIS — M54.2 CERVICALGIA: ICD-10-CM

## 2025-06-30 PROCEDURE — 97110 THERAPEUTIC EXERCISES: CPT

## 2025-06-30 PROCEDURE — 97140 MANUAL THERAPY 1/> REGIONS: CPT

## 2025-06-30 NOTE — THERAPY TREATMENT NOTE
Physical Therapy Treatment Note  [facility] Outpatient Therapy Services  A [address], [place] [address]    Patient: Zena Zheng                                                 Visit Date: 2025  :     1980    Referring practitioner:    JN Rose*  Date of Initial Visit:          Type: THERAPY  Episode: Dizziness; Cervicalgia  Number of visits this episode: 4    Visit Diagnoses:    ICD-10-CM ICD-9-CM   1. Dizziness  R42 780.4   2. Cervicalgia  M54.2 723.1   3. Bilateral headaches  R51.9 784.0     SUBJECTIVE     Subjective: Her neck is sore from her other physical therapy visit. She has had some dizziness and headaches, but they have not been as intense or frequent.     PAIN: 0/10 neck; c/o tight and tenderness today.           OBJECTIVE     Objective       Manual Therapy     37404  Comments   Prone thoracic ext mob Gr 3 T1-T4   Supine subocc release and man cerv traction    UT/LS stretches     Down glides to left and up glides for left rotation    Timed Minutes 30     Therapeutic Exercises    98560 Units Comments   I, Y, T  1x 15 each  4 sec hold.    SNAG cervical rotation to the right  10x10 sec                   Timed Minutes 15           Therapy Education/Self Care 25565   Education offered today Emphasis on scapular retraction   Medbride Code    Ongoing HEP   UT stretch   Scapular/cervical retraction  I,T,Y    Timed Minutes        Total Timed Treatment:     45   mins  Total Time of Visit:            45   mins    ASSESSMENT/PLAN      Goals                                          Progress Note due by 25                                                      Recert due by 2025   STG by: 2 Comments Date Status   Increase Cervical rotation to the Left to 80 deg R: 60  L: 80   Ongoing   Decreased tenderness to UT/LS bilaterally  Tender to left but is decreasing    Ongoing    LTG by: 8      Reports dizziness to 2/10 or less  when doing activities around the house      Reports neck pain to 2/10 or less  0/10 pain, Has had headaches and stiffness but no neck pain  6/27 ongoing   Able to walk through the community without loss of balance      Improve DHI test to 25 or less       Independent with HEP for progressive balance and stability      Anticipated CPT codes: Therapeutic Exercise 32434, Manual Therapy 98920, Therapeutic Activity 07562, Neuromuscular ReEducation 78604, Self Care/Home Management 49490, and Estim Attended 27396  Assessment/Plan     ASSESSMENT:   Pt notes that her headaches and dizziness has gone down in intensity and frequency. Therapy focusing on neck mobilizations and strengthening the scapular musculature. Her rotation the right is still limited but met our goal for 80 degrees to the left.     PLAN:   Cont with manual therapy and postural/cervical stability. Pursue VOR training.     SIGNATURE: Isaak Phillips PT Student, [place] License #:   Electronically Signed on 6/30/2025    The clinical instructor and/or supervising staff, Berry Sevilla, PT, was present in clinic guiding the visit by approving, concurring, and confirming the skilled judgement for all services rendered.    Signature:  Berry Sevilla PT, [place] License #: [id number]  Electronically signed on 6/30/2025        Samantha Mejía  [place] [address]  [phone]

## 2025-06-30 NOTE — TELEPHONE ENCOUNTER
Called patient to remind them of their appointment   -left voicemail, requesting a return call      Reminded patient of their     copay for their appointment. Reminded patient to complete their visit pre-check/digital registration in [employer].    Electronically signed by Alicia D Giraldo-Reyes on 6/30/2025 at 12:54 PM

## 2025-07-03 ENCOUNTER — APPOINTMENT (OUTPATIENT)
Dept: PHYSICAL THERAPY | Facility: HOSPITAL | Age: 45
End: 2025-07-03
Payer: MEDICAID

## 2025-07-07 ENCOUNTER — HOSPITAL ENCOUNTER (OUTPATIENT)
Dept: PHYSICAL THERAPY | Facility: HOSPITAL | Age: 45
Setting detail: THERAPIES SERIES
End: 2025-07-07
Payer: MEDICAID

## 2025-07-07 DIAGNOSIS — R42 DIZZINESS: Primary | ICD-10-CM

## 2025-07-07 DIAGNOSIS — R51.9 BILATERAL HEADACHES: ICD-10-CM

## 2025-07-07 DIAGNOSIS — M54.2 CERVICALGIA: ICD-10-CM

## 2025-07-07 RX ORDER — DICLOFENAC SODIUM 75 MG/1
75 TABLET, DELAYED RELEASE ORAL 2 TIMES DAILY
Qty: 60 TABLET | Refills: 3 | Status: SHIPPED | OUTPATIENT
Start: 2025-07-07

## 2025-07-11 ENCOUNTER — HOSPITAL ENCOUNTER (OUTPATIENT)
Dept: PHYSICAL THERAPY | Facility: HOSPITAL | Age: 45
Setting detail: THERAPIES SERIES
Discharge: HOME | End: 2025-07-11
Payer: MEDICAID

## 2025-07-11 DIAGNOSIS — M54.2 CERVICALGIA: ICD-10-CM

## 2025-07-11 DIAGNOSIS — R51.9 BILATERAL HEADACHES: ICD-10-CM

## 2025-07-11 DIAGNOSIS — R42 DIZZINESS: Primary | ICD-10-CM

## 2025-07-11 PROCEDURE — 97140 MANUAL THERAPY 1/> REGIONS: CPT | Performed by: PHYSICAL THERAPIST

## 2025-07-11 NOTE — THERAPY TREATMENT NOTE
Physical Therapy Treatment Note  [facility] Therapy [facility]  A department [address], [place] [address]    Patient: Zena Zheng                                                 Visit Date: 2025  :     1980    Referring practitioner:    JN Rose*  Date of Initial Visit:          Type: THERAPY  Episode: Dizziness; Cervicalgia  Number of visits this episode: 5    Visit Diagnoses:    ICD-10-CM ICD-9-CM   1. Dizziness  R42 780.4   2. Cervicalgia  M54.2 723.1   3. Bilateral headaches  R51.9 784.0     SUBJECTIVE     Subjective: Her neck is sore and she is having a headache today. She wonders if it's from missing the last couple of visits. She is mainly having dizziness after mowing when she might be dehydrated.     PAIN: 6/10 neck         OBJECTIVE     Objective       Manual Therapy     19884  Comments   Prone thoracic ext mob Gr 3 T1-T4   Prone CT ext mobs    Prone mark C1 PA mob Gr 3 sustained   Supine subocc release and man cerv traction    Mark upper cervical facet SG/rotation mobs [place] x 2 on right       Timed Minutes 42     Therapy Education/Self Care 25395   Education offered today Emphasis on scapular retraction   Medbride Code    Ongoing HEP   UT stretch   Scapular/cervical retraction  I,T,Y    Timed Minutes        Total Timed Treatment:     42   mins  Total Time of Visit:            42   mins    ASSESSMENT/PLAN      Goals                                          Progress Note due by 25                                                      Recert due by 2025   STG by: 2 Comments Date Status   Increase Cervical rotation to the Left to 80 deg R: 60  L: 80   Ongoing   Decreased tenderness to UT/LS bilaterally  Tender to left but is decreasing    Ongoing    LTG by: 8      Reports dizziness to 2/10 or less when doing activities around the house      Reports neck pain to 2/10 or less  6/10 pain today with HA  ongoing    Able to walk through the community without loss of balance      Improve DHI test to 25 or less       Independent with HEP for progressive balance and stability      Anticipated CPT codes: Therapeutic Exercise 75416, Manual Therapy 14275, Therapeutic Activity 13595, Neuromuscular ReEducation 82577, Self Care/Home Management 75171, and Estim Attended 10603  Assessment/Plan     ASSESSMENT:   Pt had a flare of her neck pain and headache. Her dizziness is resolving and we are mainly focusing on her neck pain now.     PLAN:   Cont with manual therapy and postural/cervical stability.     SIGNATURE: Berry Sevilla, PT, [place] License #: [id number]  Electronically Signed on 7/11/2025        [address] [place] [address] [place] [address]  [phone]

## 2025-07-16 ENCOUNTER — HOSPITAL ENCOUNTER (OUTPATIENT)
Dept: PHYSICAL THERAPY | Facility: HOSPITAL | Age: 45
Setting detail: THERAPIES SERIES
Discharge: HOME | End: 2025-07-16
Payer: MEDICAID

## 2025-07-16 DIAGNOSIS — R51.9 BILATERAL HEADACHES: ICD-10-CM

## 2025-07-16 DIAGNOSIS — M54.2 CERVICALGIA: ICD-10-CM

## 2025-07-16 DIAGNOSIS — R42 DIZZINESS: Primary | ICD-10-CM

## 2025-07-16 PROCEDURE — 97535 SELF CARE MNGMENT TRAINING: CPT

## 2025-07-16 PROCEDURE — 97140 MANUAL THERAPY 1/> REGIONS: CPT

## 2025-07-16 PROCEDURE — 97530 THERAPEUTIC ACTIVITIES: CPT

## 2025-07-16 NOTE — THERAPY TREATMENT NOTE
Physical Therapy Treatment Note and 30 Day Progress Note  [facility] Outpatient Therapy Services  A department [address], [place] [address]    Patient: Zena Zheng                                                 Visit Date: 2025  :     1980    Referring practitioner:    ABDIEL Rose  Date of Initial Visit:          Type: THERAPY  Episode: Dizziness; Cervicalgia  Number of visits this episode: 6    Visit Diagnoses:    ICD-10-CM ICD-9-CM   1. Dizziness  R42 780.4   2. Cervicalgia  M54.2 723.1   3. Bilateral headaches  R51.9 784.0     SUBJECTIVE     Subjective: She's been okay. Her neck still feels a little off, including her jaw. When she bites on the R, there remains a space in the L jaw. The dizziness has been okay, and at this point, its just mostly when she is dehydrated and no longer related to the neck pain. She reports 50% improvement since IE. She continues to struggle with L shoulder pain and neck pain.     PAIN: 4/10 HA in forehead region         OBJECTIVE     Objective     Therapeutic Activities    [id number] Comments   All goals assessed for PN                     Timed Minutes 10     Manual Therapy     [id number]  Comments   Cervical manual traction     L UT/LS release with movement     Scratch collapse  Pos at left pec minor, first rib/scalenes    Arlin's test Positive            Timed Minutes 20         Therapy Education/Self Care 94772   Education offered today HEP progressed and trained    Medbride Code    Ongoing HEP   UT stretch   Scapular/cervical retraction  I,T,Y     Access Code: [email or web address]  URL: [email or web address]  Date: 2025  Prepared by: Jazzmine Garnett    Exercises  - First Rib Mobilization with Strap  - 1 x daily - 7 x weekly - 3 sets - 10 reps  - Ulnar Nerve/Median Glide- Low Level  - 3 x daily - 7 x weekly - 10 reps  - Standing Radial Nerve Glide  - 3 x daily - 7 x weekly - 10 reps   Timed Minutes 8       Total Timed  Treatment:     38   mins  Total Time of Visit:            38   mins    ASSESSMENT/PLAN      Goals                                          Progress Note due by 8/15/25                                                      Recert due by 9/13/2025   STG by: 2 Comments Date Status   Increase Cervical rotation to the Left to 80 deg R: 60  L: 80 7/16 MET   Decreased tenderness to UT/LS bilaterally  Continued tenderness on L, continued TP activity  7/16  Ongoing    LTG by: 8      Reports dizziness to 2/10 or less when doing activities around the house Sometimes has minimal dizziness first thing in the morning 4/10 at worst  7/16 Ongoing    Reports neck pain to 2/10 or less  4/10 pain today with HA 7/16 ongoing   Able to walk through the community without loss of balance Reports only occasional LOB secondary to dizziness  7/16 Ongoing    Improve DHI test to 25 or less  IE: 68  7/16: 48 7/16 Ongoing    Independent with HEP for progressive balance and stability Reports that these are going well and are still about the right difficulty as well; added first rib mob and nerve glides today  7/16 Ongoing    Anticipated CPT codes: Therapeutic Exercise 50920, Manual Therapy 66201, Therapeutic Activity 03067, Neuromuscular ReEducation 59148, Self Care/Home Management 47246, and Estim Attended [id number]  Assessment & Plan       Assessment  Impairments: abnormal muscle tone, abnormal or restricted ROM, activity intolerance, lacks appropriate home exercise program and pain with function   Functional limitations: uncomfortable because of pain (Getting up from laying down, doing activities around the house)  Prognosis: good    Plan  Therapy options: will be seen for skilled therapy services  Planned modality interventions: dry needling, low level laser therapy, TENS and traction  Planned therapy interventions: manual therapy, neuromuscular re-education, postural training, soft tissue mobilization, spinal/joint mobilization, strengthening,  stretching, therapeutic activities, joint mobilization, home exercise program, functional ROM exercises and flexibility  Frequency: 2x week  Duration in weeks: 8  Treatment plan discussed with: patient         ASSESSMENT: Goals assessed for Progress Note Today. One goal met today, for a total of 1/2 STG and 0/5 LT goals met at this time. Overall subjective pain reports and DHI have decreased, as well as dizziness throughout the day. Further evaluation today revealed LUE neural tension as well as a TOS component to her LUE n/t  and pains in this area. Added nerve glides and first rib self mobilization to HEP, as palpation and pressure to first rib reproduced her symptoms. Scratch collapse additionally revealed potential pec minor and scalene contribution to condition. The Pt would benefit from skilled PTx to decrease pain, improve functional mobility, progress toward goals, and increase overall quality of life.    PLAN:   Assess response to nerve glides and first rib mob. Cont with manual therapy and postural/cervical stability, as well as TOS and first rib correction.     SIGNATURE: Jazzmine Garnett PT DPT, [place] License #: [id number]    Electronically Signed on 7/16/2025        [facility] [place] [address]  [phone]

## 2025-07-18 ENCOUNTER — OFFICE VISIT (OUTPATIENT)
Dept: OTOLARYNGOLOGY | Facility: CLINIC | Age: 45
End: 2025-07-18
Payer: MEDICAID

## 2025-07-18 ENCOUNTER — HOSPITAL ENCOUNTER (OUTPATIENT)
Dept: PHYSICAL THERAPY | Facility: HOSPITAL | Age: 45
Setting detail: THERAPIES SERIES
Discharge: HOME | End: 2025-07-18
Payer: MEDICAID

## 2025-07-18 VITALS
DIASTOLIC BLOOD PRESSURE: 94 MMHG | SYSTOLIC BLOOD PRESSURE: 148 MMHG | BODY MASS INDEX: 29.69 KG/M2 | HEART RATE: 72 BPM | TEMPERATURE: 98 F | WEIGHT: 184.75 LBS | HEIGHT: 66 IN

## 2025-07-18 DIAGNOSIS — Z01.10 HEARING WITHIN NORMAL LIMITS IN BOTH EARS: Primary | ICD-10-CM

## 2025-07-18 DIAGNOSIS — R51.9 BILATERAL HEADACHES: ICD-10-CM

## 2025-07-18 DIAGNOSIS — R42 DIZZINESS: Primary | ICD-10-CM

## 2025-07-18 DIAGNOSIS — M54.2 CERVICALGIA: ICD-10-CM

## 2025-07-18 DIAGNOSIS — R42 DIZZINESS: ICD-10-CM

## 2025-07-18 PROCEDURE — 97140 MANUAL THERAPY 1/> REGIONS: CPT | Performed by: PHYSICAL THERAPIST

## 2025-07-18 NOTE — THERAPY TREATMENT NOTE
Physical Therapy Treatment Note  [facility] Outpatient Therapy Services  A department [address], [place] [address]    Patient: Zena Zheng                                                 Visit Date: 2025  :     1980    Referring practitioner:    JN Rose*  Date of Initial Visit:          Type: THERAPY  Episode: Dizziness; Cervicalgia  Number of visits this episode: 7    Visit Diagnoses:    ICD-10-CM ICD-9-CM   1. Dizziness  R42 780.4   2. Cervicalgia  M54.2 723.1   3. Bilateral headaches  R51.9 784.0     SUBJECTIVE     Subjective: She was doing sit ups and started getting dizzy. Prior to that, she putting dishes in the . Her right temple was tender and she had a headache last night.     PAIN: 4/10 HA in forehead region       OBJECTIVE     Objective     Manual Therapy     00214  Comments   Assessed Grandfalls-Halpike rene neg   Prone CT ext mobs Gr 3   Prone rene C1 PA mobs Gr 3 sustained   Supine C1 SG on right side Gr 3 sustained   Rene upper cervical facet SG/rot mobs No cavitation, gentle gr 3   Supine man cervical traction    Timed Minutes 45         Therapy Education/Self Care [id number]   Education offered today HEP progressed and trained    Ana Code    Ongoing HEP   UT stretch   Scapular/cervical retraction  I,T,Y     Access Code: [email or web address]  URL: [email or web address]  Date: 2025  Prepared by: Jazzmine Garnett    Exercises  - First Rib Mobilization with Strap  - 1 x daily - 7 x weekly - 3 sets - 10 reps  - Ulnar Nerve/Median Glide- Low Level  - 3 x daily - 7 x weekly - 10 reps  - Standing Radial Nerve Glide  - 3 x daily - 7 x weekly - 10 reps   Timed Minutes        Total Timed Treatment:     45   mins  Total Time of Visit:            45   mins    ASSESSMENT/PLAN      Goals                                          Progress Note due by 8/15/25                                                      Recert due by 2025   STG by: 2  Comments Date Status   Increase Cervical rotation to the Left to 80 deg R: 60  L: 80 7/16 MET   Decreased tenderness to UT/LS bilaterally  Continued tenderness on L, continued TP activity  7/16  Ongoing    LTG by: 8      Reports dizziness to 2/10 or less when doing activities around the house Sometimes has minimal dizziness first thing in the morning 4/10 at worst  7/16 Ongoing    Reports neck pain to 2/10 or less  4/10 pain today with HA 7/16 ongoing   Able to walk through the community without loss of balance Had a flare of dizziness and balance issues after her did situps. This was improved after the mobs 7/18 Ongoing    Improve DHI test to 25 or less  IE: 68  7/16: 48 7/16 Ongoing    Independent with HEP for progressive balance and stability Reports that these are going well and are still about the right difficulty as well; added first rib mob and nerve glides today  7/16 Ongoing    Anticipated CPT codes: Therapeutic Exercise 98794, Manual Therapy 98455, Therapeutic Activity 81514, Neuromuscular ReEducation 32850, Self Care/Home Management 87787, and Estim Attended [id number]    Assessment/Plan     ASSESSMENT:   She was feeling better after the mobs today. I think she may have stressed her neck when she was doing the situps. I advised on how to avoid this with lower abdominal crunches to protect her neck.     PLAN:   Assess dizziness after upper cervical mobs.     SIGNATURE: Berry Sevilla, PT, [place] License #: [id number]    Electronically Signed on 7/18/2025        [address] [place] [address]  [place] [address]  [phone]

## 2025-07-18 NOTE — PROGRESS NOTES
[facility]AR NOSE & THRO[facility]  [address], [address]  [facility] [address]  Fax [phone]  Phone [phone]      Visit Type: FOLLOW UP   Chief Complaint   Patient presents with    Dizziness    Tinnitus    Ear recheck           HPI      History of Present Illness  The patient is a 45-year-old female here for a follow-up visit. The primary reason for this visit is dizziness. She has been evaluated by physical therapy for vestibular rehabilitation and had a treatment this morning. During the session, she experienced dizziness while doing sit-ups. Prior to that, she also felt dizzy while loading the . Additionally, she reported tenderness in her right temple and a headache last night.     Physical therapy notes indicate she has met one goal of increased cervical rotation. The plan includes adding nerve glides and first rib modification. She appears to be improving according to their assessment.    This morning, she experienced a brief episode of dizziness described as a sensation of the room spinning while loading the . A treatment session with Berry this morning seemed to alleviate her symptoms. She was informed that she might have strained something. Her physical therapy plan consists of a total of 8 weeks of treatment, with 2 sessions per week.    Results  Diagnostic Testing   - Physical therapy initial exam oculomotor exam: Normal in all planes   - Vestibular ocular test, head only and head and object: Some dizziness   - Janet-Hallpike: Negative bilaterally, some dizziness to the left, but no nystagmus    Past Medical History:   Diagnosis Date    Abnormal ECG 8-25-24    Slow heart    Allergic Birth    Allergic rhinitis 1986    Anxiety     Arthritis 2018    Asthma 2021    Trouble catching breath    BV (bacterial vaginosis)     Depression     Dizziness 2024    Fibromyalgia     Fibromyalgia, primary 07/2019     Gallbladder sludge 09/04/2024    Headache     HL (hearing loss) 2024    Hypertension     Low back pain 2020    Neuropathy     Scoliosis 09/2024    Tachycardia     Tinnitus 2024    TMJ dysfunction 2024    Tremor 2024       Past Surgical History:   Procedure Laterality Date    ANKLE SURGERY      CHOLECYSTECTOMY N/A 10/01/2024    Procedure: LAPAROSCOPIC ROBOTIC ASSISTED CHOLECYSTECTOMY WTIH PRE-OPERATIVE INDOCYANINE GREEN INJECTION;  Surgeon: Jolly Dia MD;  Location: [facility];  [facility];  Laterality: N/A;    COLONOSCOPY N/A 09/19/2024    Dr. Anne-One 7 mm adenomatous  polyp in the rectum    DILATATION AND CURETTAGE      ENDOSCOPY N/A 12/04/2023    No endoscopic esophageal abnormality to explain patient's dysphagia. Esophagus dilated. Dilated. - There is no endoscopic evidence of Ingram's esophagus. - Normal stomach. - Normal examined duodenum. - No specimens collecte    GALLBLADDER SURGERY  10/01/2024    TUBAL ABDOMINAL LIGATION      WISDOM TOOTH EXTRACTION  01/01/2021       Family History: Her family history includes Alcohol abuse in her father; Anemia in her sister; Arrhythmia in her paternal grandmother; Arthritis in her mother; Asthma in her mother; COPD in her father and mother; Cancer in her maternal grandfather; Clotting disorder in her father; Colon polyps in her mother; Depression in her mother and sister; Developmental Disability in her daughter; Heart attack in her father; Heart disease in her mother; Heart failure in her mother; Hypertension in her father and mother; No Known Problems in her brother, maternal aunt, maternal grandmother, paternal aunt, and son; Osteoarthritis in her mother; Stroke in her father.     Social History: She  reports that she has never smoked. She has never used smokeless tobacco. She reports that she does not currently use alcohol. She reports that she does not use drugs.    Home Medications:  Azelastine HCl, DULoxetine, acetaminophen,  cefuroxime, diclofenac, fluticasone, lamoTRIgine, loratadine, meclizine, mometasone, omeprazole, tiZANidine, and traZODone    Allergies:  She is allergic to adhesive tape, amoxicillin, clindamycin/lincomycin, doxycycline, erythromycin, sulfa antibiotics, and tape.       Vital Signs:   Temp:  [98 °F (36.7 °C)] 98 °F (36.7 °C)  Heart Rate:  [72] 72  BP: (148)/(94) 148/94  ENT Physical Exam  Ear  Hearing: intact;  Auricles: right auricle normal; left auricle normal;  External Mastoids: right external mastoid normal; left external mastoid normal;  Ear Canals: right ear canal normal; left ear canal normal;  Tympanic Membranes: right tympanic membrane normal; left tympanic membrane normal;       Physical Exam  Specialty Assessment: Oculomotor exam normal in all planes. Vestibular ocular test showed some dizziness. Janet-Hallpike test negative bilaterally. Some dizziness to the left, but no nystagmus.        Result Review       RESULTS REVIEW    I have reviewed the patients old records in the chart.   The following results/records were reviewed:   Treatment - Vestibular with Berry Sevilla, PT (07/18/2025)  Treatment - Vestibular with Jazzmine Garnett, PT DPT (07/16/2025)  Treatment - Vestibular with Berry Sevilla, PT (07/11/2025)  Treatment - Vestibular with Berry Sevilla, PT (06/30/2025)  Treatment - Vestibular with Berry Sevilla L, PT (06/27/2025)  Treatment - Vestibular with Berry Sevilla, PT (06/26/2025)  VESTIBULAR EVAL with Berry Sevilla, PT (06/16/2025)       Assessment & Plan  Hearing within normal limits in both ears    Cervicalgia    Dizziness       Assessment & Plan  1. Dizziness  Improvement noted with physical therapy, although dizziness occurred this morning while loading the . Physical therapy has increased cervical rotation and plans to add nerve glides and first rib modification. Continue physical therapy twice a week for a total of 8 weeks.    Follow-up in 3 months.               Return in about 3 months (around 10/18/2025).        Electronically signed by AARON Rose 07/18/25 9:40 AM CDT.     Patient or patient representative verbalized consent for the use of Ambient Listening during the visit with  AARON Rose for chart documentation. 7/18/2025  09:40 CDT

## 2025-07-21 ENCOUNTER — HOSPITAL ENCOUNTER (OUTPATIENT)
Dept: GENERAL RADIOLOGY | Facility: HOSPITAL | Age: 45
Discharge: HOME | End: 2025-07-21
Admitting: INTERNAL MEDICINE
Payer: MEDICAID

## 2025-07-21 ENCOUNTER — OFFICE VISIT (OUTPATIENT)
Dept: INTERNAL MEDICINE | Facility: CLINIC | Age: 45
End: 2025-07-21
Payer: MEDICAID

## 2025-07-21 VITALS
OXYGEN SATURATION: 99 % | HEIGHT: 66 IN | WEIGHT: 188.2 LBS | SYSTOLIC BLOOD PRESSURE: 124 MMHG | DIASTOLIC BLOOD PRESSURE: 84 MMHG | HEART RATE: 84 BPM | TEMPERATURE: 97.8 F | BODY MASS INDEX: 30.25 KG/M2

## 2025-07-21 DIAGNOSIS — K59.00 CONSTIPATION, UNSPECIFIED CONSTIPATION TYPE: ICD-10-CM

## 2025-07-21 DIAGNOSIS — R42 VERTIGO: ICD-10-CM

## 2025-07-21 DIAGNOSIS — B35.9 TINEA: ICD-10-CM

## 2025-07-21 DIAGNOSIS — R10.13 EPIGASTRIC PAIN: ICD-10-CM

## 2025-07-21 DIAGNOSIS — R10.13 EPIGASTRIC PAIN: Primary | ICD-10-CM

## 2025-07-21 DIAGNOSIS — R10.12 LUQ PAIN: ICD-10-CM

## 2025-07-21 PROCEDURE — 74018 RADEX ABDOMEN 1 VIEW: CPT

## 2025-07-21 PROCEDURE — 99213 OFFICE O/P EST LOW 20 MIN: CPT | Performed by: INTERNAL MEDICINE

## 2025-07-21 PROCEDURE — 1125F AMNT PAIN NOTED PAIN PRSNT: CPT | Performed by: INTERNAL MEDICINE

## 2025-07-21 RX ORDER — OMEPRAZOLE 40 MG/1
40 CAPSULE, DELAYED RELEASE ORAL DAILY
Qty: 30 CAPSULE | Refills: 2 | Status: SHIPPED | OUTPATIENT
Start: 2025-07-21

## 2025-07-21 RX ORDER — MECLIZINE HYDROCHLORIDE 25 MG/1
25 TABLET ORAL 3 TIMES DAILY PRN
Qty: 60 TABLET | Refills: 1 | Status: SHIPPED | OUTPATIENT
Start: 2025-07-21

## 2025-07-21 NOTE — PROGRESS NOTES
Chief Complaint   Patient presents with    UC follow up     Patient was seen in UC for fatigue and abdominal pain on 7/10/25         History:  Zena Zheng is a 45 y.o. female who presents today for evaluation of the above problems.      HPI  History of Present Illness  The patient presents for evaluation of abdominal pain, sinus infection, and skin lesion.    She sought medical attention at an urgent care facility on 07/10/2025 due to stomach discomfort, nausea, fatigue, and a persistent taste in her throat, suspecting a sinus infection. Despite completing the antibiotic course, her symptoms persisted. She was prescribed Prilosec for 14 days, which she just started today due to potential interactions with the antibiotic. She has increased her fiber intake, which has provided some relief. However, she continues to experience significant acid reflux, often feeling nauseous with acid regurgitation. Her bowel movements are irregular, occurring every other day and are small in quantity. She describes her stools as being of a consistency of 1 or 2 on the Jerome stool scale based on the diagram. She reports no rectal bleeding. The urgent care doctor also noted severe abdominal pain upon palpation, suggesting a possible ulcer. Tests for COVID-19 was negative.    She also reports a nonproductive cough that causes pain on the left side. She has been experiencing fatigue, which she attributes to increased physical activity and physical therapy. She has been diagnosed with nerve impingement in her left shoulder.    She has a brown spot on her leg that occasionally blisters and itches. The spot first appeared after the blisters. She applies cream when it flares up. She had to remove a circulatory leg device quickly due to pain, which led to blistering.    She has been experiencing uterine bleeding for several months and underwent ablation performed by Dr. Kim.    Social History:  Diet: Increased fiber intake    PAST  SURGICAL HISTORY:  Uterine ablation performed by Dr. Kim      ROS:  Review of Systems  See above    Current Outpatient Medications:     acetaminophen (Tylenol) 325 MG tablet, Take 3 tablets by mouth Every 8 (Eight) Hours. Take every 8 hours for 3 days then take prn as needed., Disp: , Rfl:     Azelastine HCl 137 MCG/SPRAY solution, SPRAY 1 TO 2 SPRAYS INTO EACH NOSTRIL TWICE A DAY AS NEEDED, Disp: , Rfl:     diclofenac (VOLTAREN) 75 MG EC tablet, Take 1 tablet by mouth Every 12 (Twelve) Hours., Disp: , Rfl:     DULoxetine (CYMBALTA) 60 MG capsule, Take 1 capsule by mouth Daily., Disp: , Rfl:     fluticasone (FLONASE) 50 MCG/ACT nasal spray, 1 spray by Each Nare route Daily., Disp: 15.8 mL, Rfl: 11    lamoTRIgine (LaMICtal) 25 MG tablet, 2 TABLETS NIGHTLY, Disp: , Rfl:     loratadine (Claritin) 10 MG tablet, Take 1 tablet by mouth Daily., Disp: , Rfl:     meclizine (ANTIVERT) 25 MG tablet, Take 1 tablet by mouth 3 (Three) Times a Day As Needed for Dizziness., Disp: 60 tablet, Rfl: 1    mometasone (ELOCON) 0.1 % solution, , Disp: , Rfl:     omeprazole (priLOSEC) 40 MG capsule, Take 1 capsule by mouth Daily., Disp: 30 capsule, Rfl: 2    tiZANidine (ZANAFLEX) 4 MG tablet, Take 1 tablet by mouth 2 (Two) Times a Day., Disp: 60 tablet, Rfl: 3    traZODone (DESYREL) 50 MG tablet, Take 1 tablet by mouth Every Night., Disp: , Rfl:     Lab Results   Component Value Date    GLUCOSE 100 (H) 04/23/2025    BUN 7 04/23/2025    CREATININE 0.69 04/23/2025     04/23/2025    K 3.4 (L) 04/23/2025     04/23/2025    CALCIUM 9.3 04/23/2025    PROTEINTOT 6.7 04/23/2025    ALBUMIN 4.2 04/23/2025    ALT 9 04/23/2025    AST 12 04/23/2025    ALKPHOS 61 04/23/2025    BILITOT 0.7 04/23/2025    GLOB 2.5 04/23/2025    AGRATIO 1.7 04/23/2025    BCR 10.1 04/23/2025    ANIONGAP 12.0 04/23/2025    EGFR 108.5 04/23/2025       WBC   Date Value Ref Range Status   04/23/2025 10.11 3.40 - 10.80 10*3/mm3 Final   02/21/2023 10.4 4.8 - 10.8  K/uL Final     RBC   Date Value Ref Range Status   04/23/2025 4.14 3.77 - 5.28 10*6/mm3 Final   02/21/2023 4.47 4.20 - 5.40 M/uL Final     Hemoglobin   Date Value Ref Range Status   04/23/2025 12.1 12.0 - 15.9 g/dL Final   02/21/2023 13.5 12.0 - 16.0 g/dL Final     Hematocrit   Date Value Ref Range Status   04/23/2025 38.2 34.0 - 46.6 % Final   02/21/2023 41.9 37.0 - 47.0 % Final     MCV   Date Value Ref Range Status   04/23/2025 92.3 79.0 - 97.0 fL Final   02/21/2023 93.7 81.0 - 99.0 fL Final     MCH   Date Value Ref Range Status   04/23/2025 29.2 26.6 - 33.0 pg Final   02/21/2023 30.2 27.0 - 31.0 pg Final     MCHC   Date Value Ref Range Status   04/23/2025 31.7 31.5 - 35.7 g/dL Final   02/21/2023 32.2 (L) 33.0 - 37.0 g/dL Final     RDW   Date Value Ref Range Status   04/23/2025 12.4 12.3 - 15.4 % Final   02/21/2023 13.2 11.5 - 14.5 % Final     RDW-SD   Date Value Ref Range Status   04/23/2025 41.8 37.0 - 54.0 fl Final     MPV   Date Value Ref Range Status   04/23/2025 10.1 6.0 - 12.0 fL Final   02/21/2023 10.4 9.4 - 12.3 fL Final     Platelets   Date Value Ref Range Status   04/23/2025 400 140 - 450 10*3/mm3 Final   02/21/2023 331 130 - 400 K/uL Final     Neutrophil Rel %   Date Value Ref Range Status   02/21/2023 67.7 (H) 50.0 - 65.0 % Final     Neutrophil %   Date Value Ref Range Status   04/23/2025 60.5 42.7 - 76.0 % Final     Lymphocyte Rel %   Date Value Ref Range Status   02/21/2023 26.2 20.0 - 40.0 % Final     Lymphocyte %   Date Value Ref Range Status   04/23/2025 31.1 19.6 - 45.3 % Final     Monocyte Rel %   Date Value Ref Range Status   02/21/2023 5.4 0.0 - 10.0 % Final     Monocyte %   Date Value Ref Range Status   04/23/2025 7.3 5.0 - 12.0 % Final     Eosinophil Rel %   Date Value Ref Range Status   02/21/2023 0.1 0.0 - 5.0 % Final     Eosinophil %   Date Value Ref Range Status   04/23/2025 0.2 (L) 0.3 - 6.2 % Final     Basophil Rel %   Date Value Ref Range Status   02/21/2023 0.4 0.0 - 1.0 % Final  "    Basophil %   Date Value Ref Range Status   04/23/2025 0.5 0.0 - 1.5 % Final     Immature Grans %   Date Value Ref Range Status   04/23/2025 0.4 0.0 - 0.5 % Final     Neutrophils Absolute   Date Value Ref Range Status   02/21/2023 7.0 1.5 - 7.5 K/uL Final     Neutrophils, Absolute   Date Value Ref Range Status   04/23/2025 6.12 1.70 - 7.00 10*3/mm3 Final     Lymphocytes Absolute   Date Value Ref Range Status   02/21/2023 2.7 1.1 - 4.5 K/uL Final     Lymphocytes, Absolute   Date Value Ref Range Status   04/23/2025 3.14 (H) 0.70 - 3.10 10*3/mm3 Final     Monocytes Absolute   Date Value Ref Range Status   02/21/2023 0.60 0.00 - 0.90 K/uL Final     Monocytes, Absolute   Date Value Ref Range Status   04/23/2025 0.74 0.10 - 0.90 10*3/mm3 Final     Eosinophils Absolute   Date Value Ref Range Status   02/21/2023 0.00 0.00 - 0.60 K/uL Final     Eosinophils, Absolute   Date Value Ref Range Status   04/23/2025 0.02 0.00 - 0.40 10*3/mm3 Final     Basophils Absolute   Date Value Ref Range Status   02/21/2023 0.00 0.00 - 0.20 K/uL Final     Basophils, Absolute   Date Value Ref Range Status   04/23/2025 0.05 0.00 - 0.20 10*3/mm3 Final     Immature Grans, Absolute   Date Value Ref Range Status   04/23/2025 0.04 0.00 - 0.05 10*3/mm3 Final   02/21/2023 0.0 K/uL Final     nRBC   Date Value Ref Range Status   04/23/2025 0.0 0.0 - 0.2 /100 WBC Final         OBJECTIVE:  Visit Vitals  /84 (BP Location: Left arm, Patient Position: Sitting, Cuff Size: Adult)   Pulse 84   Temp 97.8 °F (36.6 °C) (Temporal)   Ht 167.6 cm (66\")   Wt 85.4 kg (188 lb 3.2 oz)   LMP 06/10/2025   SpO2 99%   BMI 30.38 kg/m²      Physical Exam  Constitutional:       General: She is not in acute distress.     Appearance: She is well-developed. She is not diaphoretic.   HENT:      Head: Normocephalic and atraumatic.   Eyes:      Pupils: Pupils are equal, round, and reactive to light.   Neck:      Thyroid: No thyromegaly.      Trachea: Phonation normal. "   Pulmonary:      Effort: No respiratory distress.   Abdominal:      Palpations: Abdomen is soft.      Tenderness: There is abdominal tenderness.      Hernia: No hernia is present.       Skin:     Coloration: Skin is not pale.      Findings: No erythema.          Neurological:      Mental Status: She is alert.   Psychiatric:         Behavior: Behavior normal.         Thought Content: Thought content normal.         Judgment: Judgment normal.       Physical Exam  Extremities: Brown spot on leg with blisters, appears consistent with ringworm.  Skin: Brown spot on leg with blisters, appears consistent with ringworm.    Results  Labs   - COVID-19 test: Negative   - Rheumatoid test: Negative    Assessment/Plan      Diagnoses and all orders for this visit:    1. Epigastric pain (Primary)  -     omeprazole (priLOSEC) 40 MG capsule; Take 1 capsule by mouth Daily.  Dispense: 30 capsule; Refill: 2  -     XR Abdomen KUB; Future    2. Vertigo  -     meclizine (ANTIVERT) 25 MG tablet; Take 1 tablet by mouth 3 (Three) Times a Day As Needed for Dizziness.  Dispense: 60 tablet; Refill: 1    3. LUQ pain  -     omeprazole (priLOSEC) 40 MG capsule; Take 1 capsule by mouth Daily.  Dispense: 30 capsule; Refill: 2    4. Constipation, unspecified constipation type  -     XR Abdomen KUB; Future      Assessment & Plan    Abdominal pain.  - Symptoms suggest a possible diagnosis of gastritis or constipation, contributing to reflux.  - Physical exam indicates abdominal discomfort and acid reflux.  - An x-ray of the abdomen will be ordered to assess for constipation.  - Prilosec 20 mg refill provided, to be taken for 3 months. Improvement may take up to 7 days. If symptoms worsen, immediate notification is advised.    Skin lesion.  - Lesion consistent with ringworm, a fungal infection.  - Physical exam shows brown spot with occasional blisters.  - Advised to apply terbinafine (Lamisil) or clotrimazole (Lotrimin) cream twice daily for up to 4  weeks. Switch to the other medication if ineffective.    Vertigo.  - Symptoms of vertigo discussed.  - Meclizine refill provided for management.      Return for Next scheduled follow up or sooner if needed.      GILLIAN Nichole MD  08:19 CDT  7/21/2025   Electronically signed    Patient or patient representative verbalized consent for the use of Ambient Listening during the visit with  DARIO Nichole MD for chart documentation. 7/21/2025  08:23 CDT

## 2025-07-22 ENCOUNTER — HOSPITAL ENCOUNTER (OUTPATIENT)
Dept: PHYSICAL THERAPY | Facility: HOSPITAL | Age: 45
Setting detail: THERAPIES SERIES
Discharge: HOME | End: 2025-07-22
Payer: MEDICAID

## 2025-07-22 ENCOUNTER — OFFICE VISIT (OUTPATIENT)
Age: 45
End: 2025-07-22
Payer: MEDICAID

## 2025-07-22 VITALS — HEIGHT: 67 IN | BODY MASS INDEX: 29.66 KG/M2 | WEIGHT: 189 LBS

## 2025-07-22 DIAGNOSIS — R42 DIZZINESS: Primary | ICD-10-CM

## 2025-07-22 DIAGNOSIS — M54.2 CERVICALGIA: ICD-10-CM

## 2025-07-22 DIAGNOSIS — R51.9 BILATERAL HEADACHES: ICD-10-CM

## 2025-07-22 DIAGNOSIS — M75.02 ADHESIVE CAPSULITIS OF LEFT SHOULDER: Primary | ICD-10-CM

## 2025-07-22 PROCEDURE — 97140 MANUAL THERAPY 1/> REGIONS: CPT | Performed by: PHYSICAL THERAPIST

## 2025-07-22 PROCEDURE — 99213 OFFICE O/P EST LOW 20 MIN: CPT | Performed by: PHYSICIAN ASSISTANT

## 2025-07-22 PROCEDURE — 20610 DRAIN/INJ JOINT/BURSA W/O US: CPT | Performed by: PHYSICIAN ASSISTANT

## 2025-07-22 RX ORDER — LIDOCAINE HYDROCHLORIDE 10 MG/ML
2 INJECTION, SOLUTION INFILTRATION; PERINEURAL ONCE
Status: COMPLETED | OUTPATIENT
Start: 2025-07-22 | End: 2025-07-22

## 2025-07-22 RX ORDER — TRIAMCINOLONE ACETONIDE 40 MG/ML
40 INJECTION, SUSPENSION INTRA-ARTICULAR; INTRAMUSCULAR ONCE
Status: COMPLETED | OUTPATIENT
Start: 2025-07-22 | End: 2025-07-22

## 2025-07-22 RX ORDER — ROPIVACAINE HYDROCHLORIDE 5 MG/ML
2 INJECTION, SOLUTION EPIDURAL; INFILTRATION; PERINEURAL ONCE
Status: COMPLETED | OUTPATIENT
Start: 2025-07-22 | End: 2025-07-22

## 2025-07-22 RX ORDER — OMEPRAZOLE 40 MG/1
40 CAPSULE, DELAYED RELEASE ORAL DAILY
COMMUNITY
Start: 2025-07-21

## 2025-07-22 RX ADMIN — ROPIVACAINE HYDROCHLORIDE 2 ML: 5 INJECTION, SOLUTION EPIDURAL; INFILTRATION; PERINEURAL at 15:04

## 2025-07-22 RX ADMIN — TRIAMCINOLONE ACETONIDE 40 MG: 40 INJECTION, SUSPENSION INTRA-ARTICULAR; INTRAMUSCULAR at 15:05

## 2025-07-22 RX ADMIN — LIDOCAINE HYDROCHLORIDE 2 ML: 10 INJECTION, SOLUTION INFILTRATION; PERINEURAL at 15:04

## 2025-07-22 NOTE — THERAPY TREATMENT NOTE
"Physical Therapy Treatment Note  [facility]  A department [address], [place] [address]    Patient: Zena Zheng                                                 Visit Date: 2025  :     1980    Referring practitioner:    JN Rose*  Date of Initial Visit:          Type: THERAPY  Episode: Dizziness; Cervicalgia  Number of visits this episode: 8    Visit Diagnoses:    ICD-10-CM ICD-9-CM   1. Dizziness  R42 780.4   2. Cervicalgia  M54.2 723.1   3. Bilateral headaches  R51.9 784.0     SUBJECTIVE     Subjective: She says her left arm has had \"nerve pain\" down to the elbow    PAIN: 4/10 HA in forehead region       OBJECTIVE     Objective     Manual Therapy     86262  Comments   Assessed left shoulder Pos empty can and Valdivia, tender over suprapinatus   Prone CT ext mobs Gr 3   Prone rene C1 PA mobs Gr 3 sustained   Supine C1 SG on right side Gr 3 sustained   Rene upper cervical facet SG/rot mobs No cavitation, gentle gr 3   Supine man cervical traction    Timed Minutes 45         Therapy Education/Self Care 03614   Education offered today Instructed on tband shoulder ER with scapular retraction   Medbride Code    Ongoing HEP   UT stretch   Scapular/cervical retraction  I,T,Y     Access Code: [email or web address]  URL: [email or web address]  Date: 2025  Prepared by: Jazzmine Garnett    Exercises  - First Rib Mobilization with Strap  - 1 x daily - 7 x weekly - 3 sets - 10 reps  - Ulnar Nerve/Median Glide- Low Level  - 3 x daily - 7 x weekly - 10 reps  - Standing Radial Nerve Glide  - 3 x daily - 7 x weekly - 10 reps   Timed Minutes        Total Timed Treatment:     45   mins  Total Time of Visit:            45   mins    ASSESSMENT/PLAN      Goals                                          Progress Note due by 8/15/25                                                      Recert due by 2025   STG by: 2 Comments Date Status   Increase " Cervical rotation to the Left to 80 deg R: 60  L: 80 7/16 MET   Decreased tenderness to UT/LS bilaterally  Continued tenderness on L, continued TP activity  7/16  Ongoing    LTG by: 8      Reports dizziness to 2/10 or less when doing activities around the house Sometimes has minimal dizziness first thing in the morning 4/10 at worst  7/22 Ongoing    Reports neck pain to 2/10 or less  4/10 pain today with HA 7/16 ongoing   Able to walk through the community without loss of balance Had a flare of dizziness and balance issues after her did situps. This was improved after the mobs 7/18 Ongoing    Improve DHI test to 25 or less  IE: 68  7/16: 48 7/16 Ongoing    Independent with HEP for progressive balance and stability Modified scapular retraction to not elevate arms as it appears more RC.  7/22 Ongoing    Anticipated CPT codes: Therapeutic Exercise 51608, Manual Therapy 59380, Therapeutic Activity 75951, Neuromuscular ReEducation 27040, Self Care/Home Management 85669, and Estim Attended [id number]    Assessment/Plan     ASSESSMENT:   Her left upper arm pain appears to be more RC referred pain. I modified her HEP to avoid impingement of her left supraspinatus. Her dizziness is mostly gone except the flare she had last visit.     PLAN:   Cont with postural stability and man therapy as needed.     SIGNATURE: Berry Sevilla, PT, [place] License #: [id number]    Electronically Signed on 7/22/2025        [address] [place] [address] Cesar Servin. [id number]  [phone]
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
## 2025-07-25 ENCOUNTER — HOSPITAL ENCOUNTER (OUTPATIENT)
Dept: PHYSICAL THERAPY | Facility: HOSPITAL | Age: 45
Setting detail: THERAPIES SERIES
Discharge: HOME | End: 2025-07-25
Payer: MEDICAID

## 2025-07-25 DIAGNOSIS — R42 DIZZINESS: Primary | ICD-10-CM

## 2025-07-25 DIAGNOSIS — R51.9 BILATERAL HEADACHES: ICD-10-CM

## 2025-07-25 DIAGNOSIS — M54.2 CERVICALGIA: ICD-10-CM

## 2025-07-25 PROCEDURE — 97140 MANUAL THERAPY 1/> REGIONS: CPT | Performed by: PHYSICAL THERAPIST

## 2025-07-25 NOTE — THERAPY TREATMENT NOTE
Physical Therapy Treatment Note  [facility]  A department [address], [place] [address]    Patient: Zena Zheng                                                 Visit Date: 2025  :     1980    Referring practitioner:    JN Rose*  Date of Initial Visit:          Type: THERAPY  Episode: Dizziness; Cervicalgia  Number of visits this episode: 9    Visit Diagnoses:    ICD-10-CM ICD-9-CM   1. Dizziness  R42 780.4   2. Cervicalgia  M54.2 723.1   3. Bilateral headaches  R51.9 784.0     SUBJECTIVE     Subjective: She got the steroid shot in her left shoulder. The left is looser but the right is tighter. She denies any of the dizzy spells.     PAIN: 2/10 tight in neck       OBJECTIVE     Objective     Manual Therapy     [id number]  Comments   Assessed left shoulder Pos empty can and Valdivia, tender over suprapinatus   Prone CT ext mobs Gr 3   Prone rene C1 PA mobs Gr 3 sustained   Supine C1 SG on right side Gr 3 sustained   Rene upper cervical facet SG/rot mobs Cavitation left lower neck, gentle gr 3       Timed Minutes 45       Therapy Education/Self Care 46644   Education offered today Instructed on tband shoulder ER with scapular retraction   Medbride Code    Ongoing HEP   UT stretch   Scapular/cervical retraction  I,T,Y     Access Code: [email or web address]  URL: [email or web address]  Date: 2025  Prepared by: Jazzmine Garnett    Exercises  - First Rib Mobilization with Strap  - 1 x daily - 7 x weekly - 3 sets - 10 reps  - Ulnar Nerve/Median Glide- Low Level  - 3 x daily - 7 x weekly - 10 reps  - Standing Radial Nerve Glide  - 3 x daily - 7 x weekly - 10 reps   Timed Minutes        Total Timed Treatment:     45   mins  Total Time of Visit:             45   mins    ASSESSMENT/PLAN      Goals                                          Progress Note due by 8/15/25                                                      Recert due by 2025    STG by: 2 Comments Date Status   Increase Cervical rotation to the Left to 80 deg R: 60  L: 80 7/16 MET   Decreased tenderness to UT/LS bilaterally  Continued tenderness on L, continued TP activity  7/16  Ongoing    LTG by: 8      Reports dizziness to 2/10 or less when doing activities around the house Sometimes has minimal dizziness first thing in the morning 4/10 at worst  7/22 Ongoing    Reports neck pain to 2/10 or less  2/10 pain today with HA 7/25 ongoing   Able to walk through the community without loss of balance Had a flare of dizziness and balance issues after her did situps. This was improved after the mobs 7/18 Ongoing    Improve DHI test to 25 or less  IE: 68  7/16: 48 7/16 Ongoing    Independent with HEP for progressive balance and stability Modified scapular retraction to not elevate arms as it appears more RC.  7/22 Ongoing    Anticipated CPT codes: Therapeutic Exercise 00139, Manual Therapy 68620, Therapeutic Activity 98756, Neuromuscular ReEducation 63310, Self Care/Home Management 78892, and Estim Attended 88791    Assessment/Plan     ASSESSMENT:   Her left arm symptoms are better after the shoulder steroid injection. Her neck is much looser and today was more neck stiffness than pain.     PLAN:   Cont with postural stability and man therapy as needed.     SIGNATURE: Berry Sevilla, PT, [place] License #: [id number]    Electronically Signed on 7/25/2025        [facility] CountyCesar Real. [address]  [phone]

## 2025-07-28 ENCOUNTER — HOSPITAL ENCOUNTER (OUTPATIENT)
Dept: PHYSICAL THERAPY | Facility: HOSPITAL | Age: 45
Setting detail: THERAPIES SERIES
End: 2025-07-28
Payer: MEDICAID

## 2025-07-30 DIAGNOSIS — H65.93 FLUID LEVEL BEHIND TYMPANIC MEMBRANE OF BOTH EARS: ICD-10-CM

## 2025-07-30 RX ORDER — FLUTICASONE PROPIONATE 50 MCG
1 SPRAY, SUSPENSION (ML) NASAL DAILY
Qty: 16 G | Refills: 11 | Status: SHIPPED | OUTPATIENT
Start: 2025-07-30

## 2025-07-30 NOTE — TELEPHONE ENCOUNTER
Rx Refill Note  Requested Prescriptions     Pending Prescriptions Disp Refills    fluticasone (FLONASE) 50 MCG/ACT nasal spray [Pharmacy Med Name: FLUTICASONE PROP 50 MCG SPRAY]  11     Sig: SPRAY 1 SPRAY INTO EACH NOSTRIL EVERY DAY      Last office visit with prescribing clinician: 4/4/2025   Last telemedicine visit with prescribing clinician: Visit date not found   Next office visit with prescribing clinician: Visit date not found                         Would you like a call back once the refill request has been completed: [] Yes [] No    If the office needs to give you a call back, can they leave a voicemail: [] Yes [] No    Carlos Souza MA  07/30/25, 08:35 CDT

## 2025-08-01 ENCOUNTER — TELEPHONE (OUTPATIENT)
Dept: INTERNAL MEDICINE | Facility: CLINIC | Age: 45
End: 2025-08-01
Payer: MEDICAID

## 2025-08-01 NOTE — TELEPHONE ENCOUNTER
PATIENT HAS CALLED, SHE IS REQUESTING A REFILL OF THE GABAPENTIN.    IT DOES APPEAR THAT GABAPENTIN HAS BEEN DISCONTINUED FROM HER MEDICATION LIST.   PATIENT IS ASKING WHY.   PLEASE ADVISE.

## 2025-08-05 ENCOUNTER — OFFICE VISIT (OUTPATIENT)
Dept: NEUROLOGY | Age: 45
End: 2025-08-05
Payer: MEDICAID

## 2025-08-05 VITALS
DIASTOLIC BLOOD PRESSURE: 70 MMHG | OXYGEN SATURATION: 99 % | HEART RATE: 80 BPM | HEIGHT: 67 IN | WEIGHT: 189 LBS | RESPIRATION RATE: 16 BRPM | BODY MASS INDEX: 29.66 KG/M2 | SYSTOLIC BLOOD PRESSURE: 124 MMHG

## 2025-08-05 DIAGNOSIS — R20.0 NUMBNESS: ICD-10-CM

## 2025-08-05 DIAGNOSIS — H93.19 TINNITUS, UNSPECIFIED LATERALITY: ICD-10-CM

## 2025-08-05 DIAGNOSIS — H91.93 BILATERAL HEARING LOSS, UNSPECIFIED HEARING LOSS TYPE: ICD-10-CM

## 2025-08-05 DIAGNOSIS — R42 VERTIGO: Primary | ICD-10-CM

## 2025-08-05 PROCEDURE — 99204 OFFICE O/P NEW MOD 45 MIN: CPT | Performed by: PSYCHIATRY & NEUROLOGY

## 2025-08-05 ASSESSMENT — PATIENT HEALTH QUESTIONNAIRE - PHQ9
9. THOUGHTS THAT YOU WOULD BE BETTER OFF DEAD, OR OF HURTING YOURSELF: SEVERAL DAYS
SUM OF ALL RESPONSES TO PHQ QUESTIONS 1-9: 9
3. TROUBLE FALLING OR STAYING ASLEEP: SEVERAL DAYS
10. IF YOU CHECKED OFF ANY PROBLEMS, HOW DIFFICULT HAVE THESE PROBLEMS MADE IT FOR YOU TO DO YOUR WORK, TAKE CARE OF THINGS AT HOME, OR GET ALONG WITH OTHER PEOPLE: VERY DIFFICULT
3. TROUBLE FALLING OR STAYING ASLEEP: SEVERAL DAYS
1. LITTLE INTEREST OR PLEASURE IN DOING THINGS: SEVERAL DAYS
SUM OF ALL RESPONSES TO PHQ QUESTIONS 1-9: 9
9. THOUGHTS THAT YOU WOULD BE BETTER OFF DEAD, OR OF HURTING YOURSELF: SEVERAL DAYS
8. MOVING OR SPEAKING SO SLOWLY THAT OTHER PEOPLE COULD HAVE NOTICED. OR THE OPPOSITE, BEING SO FIGETY OR RESTLESS THAT YOU HAVE BEEN MOVING AROUND A LOT MORE THAN USUAL: SEVERAL DAYS
2. FEELING DOWN, DEPRESSED OR HOPELESS: SEVERAL DAYS
4. FEELING TIRED OR HAVING LITTLE ENERGY: SEVERAL DAYS
6. FEELING BAD ABOUT YOURSELF - OR THAT YOU ARE A FAILURE OR HAVE LET YOURSELF OR YOUR FAMILY DOWN: SEVERAL DAYS
6. FEELING BAD ABOUT YOURSELF - OR THAT YOU ARE A FAILURE OR HAVE LET YOURSELF OR YOUR FAMILY DOWN: SEVERAL DAYS
5. POOR APPETITE OR OVEREATING: SEVERAL DAYS
SUM OF ALL RESPONSES TO PHQ QUESTIONS 1-9: 9
10. IF YOU CHECKED OFF ANY PROBLEMS, HOW DIFFICULT HAVE THESE PROBLEMS MADE IT FOR YOU TO DO YOUR WORK, TAKE CARE OF THINGS AT HOME, OR GET ALONG WITH OTHER PEOPLE: VERY DIFFICULT
4. FEELING TIRED OR HAVING LITTLE ENERGY: SEVERAL DAYS
2. FEELING DOWN, DEPRESSED OR HOPELESS: SEVERAL DAYS
5. POOR APPETITE OR OVEREATING: SEVERAL DAYS
1. LITTLE INTEREST OR PLEASURE IN DOING THINGS: SEVERAL DAYS
SUM OF ALL RESPONSES TO PHQ QUESTIONS 1-9: 9
SUM OF ALL RESPONSES TO PHQ QUESTIONS 1-9: 8
7. TROUBLE CONCENTRATING ON THINGS, SUCH AS READING THE NEWSPAPER OR WATCHING TELEVISION: SEVERAL DAYS
8. MOVING OR SPEAKING SO SLOWLY THAT OTHER PEOPLE COULD HAVE NOTICED. OR THE OPPOSITE - BEING SO FIDGETY OR RESTLESS THAT YOU HAVE BEEN MOVING AROUND A LOT MORE THAN USUAL: SEVERAL DAYS
7. TROUBLE CONCENTRATING ON THINGS, SUCH AS READING THE NEWSPAPER OR WATCHING TELEVISION: SEVERAL DAYS

## 2025-08-05 ASSESSMENT — ANXIETY QUESTIONNAIRES
IF YOU CHECKED OFF ANY PROBLEMS ON THIS QUESTIONNAIRE, HOW DIFFICULT HAVE THESE PROBLEMS MADE IT FOR YOU TO DO YOUR WORK, TAKE CARE OF THINGS AT HOME, OR GET ALONG WITH OTHER PEOPLE: SOMEWHAT DIFFICULT
6. BECOMING EASILY ANNOYED OR IRRITABLE: SEVERAL DAYS
IF YOU CHECKED OFF ANY PROBLEMS ON THIS QUESTIONNAIRE, HOW DIFFICULT HAVE THESE PROBLEMS MADE IT FOR YOU TO DO YOUR WORK, TAKE CARE OF THINGS AT HOME, OR GET ALONG WITH OTHER PEOPLE: SOMEWHAT DIFFICULT
5. BEING SO RESTLESS THAT IT IS HARD TO SIT STILL: SEVERAL DAYS
2. NOT BEING ABLE TO STOP OR CONTROL WORRYING: NEARLY EVERY DAY
3. WORRYING TOO MUCH ABOUT DIFFERENT THINGS: MORE THAN HALF THE DAYS
5. BEING SO RESTLESS THAT IT IS HARD TO SIT STILL: SEVERAL DAYS
1. FEELING NERVOUS, ANXIOUS, OR ON EDGE: SEVERAL DAYS
6. BECOMING EASILY ANNOYED OR IRRITABLE: SEVERAL DAYS
7. FEELING AFRAID AS IF SOMETHING AWFUL MIGHT HAPPEN: SEVERAL DAYS
7. FEELING AFRAID AS IF SOMETHING AWFUL MIGHT HAPPEN: SEVERAL DAYS
1. FEELING NERVOUS, ANXIOUS, OR ON EDGE: SEVERAL DAYS
4. TROUBLE RELAXING: MORE THAN HALF THE DAYS
GAD7 TOTAL SCORE: 11
4. TROUBLE RELAXING: MORE THAN HALF THE DAYS
2. NOT BEING ABLE TO STOP OR CONTROL WORRYING: NEARLY EVERY DAY
3. WORRYING TOO MUCH ABOUT DIFFERENT THINGS: MORE THAN HALF THE DAYS

## 2025-08-05 ASSESSMENT — COLUMBIA-SUICIDE SEVERITY RATING SCALE - C-SSRS
7. DID THIS OCCUR IN THE LAST THREE MONTHS: NO
2. IN THE PAST MONTH, HAVE YOU ACTUALLY HAD ANY THOUGHTS OF KILLING YOURSELF?: NO
6. IN YOUR LIFETIME, HAVE YOU EVER DONE ANYTHING, STARTED TO DO ANYTHING, OR PREPARED TO DO ANYTHING TO END YOUR LIFE?: YES
1. IN THE PAST MONTH, HAVE YOU WISHED YOU WERE DEAD OR WISHED YOU COULD GO TO SLEEP AND NOT WAKE UP?: YES

## 2025-08-06 ENCOUNTER — TELEPHONE (OUTPATIENT)
Dept: PSYCHIATRY | Age: 45
End: 2025-08-06

## 2025-08-07 ENCOUNTER — OFFICE VISIT (OUTPATIENT)
Dept: PSYCHIATRY | Age: 45
End: 2025-08-07
Payer: MEDICAID

## 2025-08-07 VITALS
WEIGHT: 188 LBS | HEART RATE: 80 BPM | HEIGHT: 66 IN | OXYGEN SATURATION: 98 % | BODY MASS INDEX: 30.22 KG/M2 | SYSTOLIC BLOOD PRESSURE: 121 MMHG | TEMPERATURE: 97.9 F | DIASTOLIC BLOOD PRESSURE: 78 MMHG

## 2025-08-07 DIAGNOSIS — F33.0 MAJOR DEPRESSIVE DISORDER, RECURRENT, MILD: Primary | ICD-10-CM

## 2025-08-07 DIAGNOSIS — F41.1 GENERALIZED ANXIETY DISORDER: ICD-10-CM

## 2025-08-07 DIAGNOSIS — G47.00 INSOMNIA, UNSPECIFIED TYPE: ICD-10-CM

## 2025-08-07 PROCEDURE — 99214 OFFICE O/P EST MOD 30 MIN: CPT

## 2025-08-07 RX ORDER — GABAPENTIN 300 MG/1
300 CAPSULE ORAL 2 TIMES DAILY
Qty: 60 CAPSULE | Refills: 2 | Status: SHIPPED | OUTPATIENT
Start: 2025-08-07 | End: 2025-11-05

## 2025-08-07 RX ORDER — TRAZODONE HYDROCHLORIDE 50 MG/1
TABLET ORAL
Qty: 90 TABLET | Refills: 2 | Status: SHIPPED | OUTPATIENT
Start: 2025-08-07

## 2025-08-07 ASSESSMENT — ENCOUNTER SYMPTOMS
ALLERGIC/IMMUNOLOGIC NEGATIVE: 1
RESPIRATORY NEGATIVE: 1
GASTROINTESTINAL NEGATIVE: 1
EYES NEGATIVE: 1

## 2025-08-08 ENCOUNTER — HOSPITAL ENCOUNTER (OUTPATIENT)
Dept: PHYSICAL THERAPY | Facility: HOSPITAL | Age: 45
Setting detail: THERAPIES SERIES
Discharge: HOME | End: 2025-08-08
Payer: MEDICAID

## 2025-08-08 DIAGNOSIS — M54.2 CERVICALGIA: ICD-10-CM

## 2025-08-08 DIAGNOSIS — R51.9 BILATERAL HEADACHES: ICD-10-CM

## 2025-08-08 DIAGNOSIS — R42 DIZZINESS: Primary | ICD-10-CM

## 2025-08-08 PROCEDURE — 97140 MANUAL THERAPY 1/> REGIONS: CPT | Performed by: PHYSICAL THERAPIST

## 2025-08-11 ENCOUNTER — HOSPITAL ENCOUNTER (OUTPATIENT)
Dept: PHYSICAL THERAPY | Facility: HOSPITAL | Age: 45
Setting detail: THERAPIES SERIES
Discharge: HOME | End: 2025-08-11
Payer: MEDICAID

## 2025-08-11 DIAGNOSIS — R51.9 BILATERAL HEADACHES: ICD-10-CM

## 2025-08-11 DIAGNOSIS — R42 DIZZINESS: Primary | ICD-10-CM

## 2025-08-11 DIAGNOSIS — M54.2 CERVICALGIA: ICD-10-CM

## 2025-08-11 PROCEDURE — 97140 MANUAL THERAPY 1/> REGIONS: CPT

## 2025-08-18 ENCOUNTER — RESULTS FOLLOW-UP (OUTPATIENT)
Age: 45
End: 2025-08-18

## 2025-08-18 ENCOUNTER — OFFICE VISIT (OUTPATIENT)
Dept: OBGYN CLINIC | Age: 45
End: 2025-08-18
Payer: MEDICAID

## 2025-08-18 ENCOUNTER — HOSPITAL ENCOUNTER (OUTPATIENT)
Dept: PHYSICAL THERAPY | Facility: HOSPITAL | Age: 45
Setting detail: THERAPIES SERIES
Discharge: HOME | End: 2025-08-18
Payer: MEDICAID

## 2025-08-18 ENCOUNTER — OFFICE VISIT (OUTPATIENT)
Age: 45
End: 2025-08-18

## 2025-08-18 VITALS
HEIGHT: 66 IN | HEART RATE: 84 BPM | BODY MASS INDEX: 31.34 KG/M2 | OXYGEN SATURATION: 97 % | DIASTOLIC BLOOD PRESSURE: 87 MMHG | RESPIRATION RATE: 18 BRPM | WEIGHT: 195 LBS | TEMPERATURE: 98.1 F | SYSTOLIC BLOOD PRESSURE: 124 MMHG

## 2025-08-18 VITALS
DIASTOLIC BLOOD PRESSURE: 82 MMHG | BODY MASS INDEX: 31.18 KG/M2 | HEART RATE: 76 BPM | SYSTOLIC BLOOD PRESSURE: 125 MMHG | HEIGHT: 66 IN | WEIGHT: 194 LBS

## 2025-08-18 DIAGNOSIS — Z98.890 S/P ENDOMETRIAL ABLATION: ICD-10-CM

## 2025-08-18 DIAGNOSIS — Z98.890 S/P D&C (STATUS POST DILATION AND CURETTAGE): ICD-10-CM

## 2025-08-18 DIAGNOSIS — D25.1 INTRAMURAL LEIOMYOMA OF UTERUS: Primary | ICD-10-CM

## 2025-08-18 DIAGNOSIS — R51.9 BILATERAL HEADACHES: ICD-10-CM

## 2025-08-18 DIAGNOSIS — M79.89 LEG SWELLING: Primary | ICD-10-CM

## 2025-08-18 DIAGNOSIS — R42 DIZZINESS: Primary | ICD-10-CM

## 2025-08-18 DIAGNOSIS — M54.2 CERVICALGIA: ICD-10-CM

## 2025-08-18 LAB
ALBUMIN SERPL-MCNC: 3.7 G/DL (ref 3.5–5.2)
ALP SERPL-CCNC: 67 U/L (ref 35–104)
ALT SERPL-CCNC: 15 U/L (ref 10–35)
ANION GAP SERPL CALCULATED.3IONS-SCNC: 11 MMOL/L (ref 8–16)
AST SERPL-CCNC: 21 U/L (ref 10–35)
BASOPHILS # BLD: 0 K/UL (ref 0–0.2)
BASOPHILS NFR BLD: 0.4 % (ref 0–1)
BILIRUB SERPL-MCNC: <0.2 MG/DL (ref 0.2–1.2)
BNP BLD-MCNC: 98 PG/ML (ref 0–124)
BUN SERPL-MCNC: 4 MG/DL (ref 6–20)
CALCIUM SERPL-MCNC: 8.9 MG/DL (ref 8.6–10)
CHLORIDE SERPL-SCNC: 105 MMOL/L (ref 98–107)
CO2 SERPL-SCNC: 25 MMOL/L (ref 22–29)
CREAT SERPL-MCNC: 0.7 MG/DL (ref 0.5–0.9)
EOSINOPHIL # BLD: 0.2 K/UL (ref 0–0.6)
EOSINOPHIL NFR BLD: 2.5 % (ref 0–5)
ERYTHROCYTE [DISTWIDTH] IN BLOOD BY AUTOMATED COUNT: 13.6 % (ref 11.5–14.5)
GLUCOSE SERPL-MCNC: 121 MG/DL (ref 70–99)
HCT VFR BLD AUTO: 35.7 % (ref 37–47)
HGB BLD-MCNC: 11.4 G/DL (ref 12–16)
IMM GRANULOCYTES # BLD: 0 K/UL
LYMPHOCYTES # BLD: 2.8 K/UL (ref 1.1–4.5)
LYMPHOCYTES NFR BLD: 35 % (ref 20–40)
MCH RBC QN AUTO: 30 PG (ref 27–31)
MCHC RBC AUTO-ENTMCNC: 31.9 G/DL (ref 33–37)
MCV RBC AUTO: 93.9 FL (ref 81–99)
MONOCYTES # BLD: 0.6 K/UL (ref 0–0.9)
MONOCYTES NFR BLD: 6.9 % (ref 0–10)
NEUTROPHILS # BLD: 4.4 K/UL (ref 1.5–7.5)
NEUTS SEG NFR BLD: 54.7 % (ref 50–65)
PLATELET # BLD AUTO: 352 K/UL (ref 130–400)
PMV BLD AUTO: 10.6 FL (ref 9.4–12.3)
POTASSIUM SERPL-SCNC: 3.5 MMOL/L (ref 3.5–5.1)
PROT SERPL-MCNC: 6.3 G/DL (ref 6.4–8.3)
RBC # BLD AUTO: 3.8 M/UL (ref 4.2–5.4)
SODIUM SERPL-SCNC: 141 MMOL/L (ref 136–145)
WBC # BLD AUTO: 8 K/UL (ref 4.8–10.8)

## 2025-08-18 PROCEDURE — 97535 SELF CARE MNGMENT TRAINING: CPT

## 2025-08-18 PROCEDURE — 99212 OFFICE O/P EST SF 10 MIN: CPT | Performed by: OBSTETRICS & GYNECOLOGY

## 2025-08-18 PROCEDURE — 97140 MANUAL THERAPY 1/> REGIONS: CPT

## 2025-08-18 ASSESSMENT — ENCOUNTER SYMPTOMS
SORE THROAT: 0
COUGH: 0
COLOR CHANGE: 0
WHEEZING: 0
ABDOMINAL PAIN: 0
EYE PAIN: 0
SHORTNESS OF BREATH: 0
STRIDOR: 0
SINUS PRESSURE: 0
ABDOMINAL DISTENTION: 0
EYE DISCHARGE: 0
TROUBLE SWALLOWING: 0
CHEST TIGHTNESS: 0

## 2025-08-20 ENCOUNTER — HOSPITAL ENCOUNTER (EMERGENCY)
Facility: HOSPITAL | Age: 45
Discharge: HOME | End: 2025-08-20
Attending: FAMILY MEDICINE | Admitting: FAMILY MEDICINE
Payer: MEDICAID

## 2025-08-20 ENCOUNTER — APPOINTMENT (OUTPATIENT)
Dept: CT IMAGING | Facility: HOSPITAL | Age: 45
End: 2025-08-20
Payer: MEDICAID

## 2025-09-05 ENCOUNTER — TELEPHONE (OUTPATIENT)
Dept: PSYCHIATRY | Age: 45
End: 2025-09-05

## (undated) DEVICE — BLADELESS OBTURATOR: Brand: WECK VISTA

## (undated) DEVICE — SET ENDOSCP SEAL HYSTEROSCOPE RIG OUTFLO CHN DISP MYOSURE

## (undated) DEVICE — DAVINCI: Brand: MEDLINE INDUSTRIES, INC.

## (undated) DEVICE — Device: Brand: DEFENDO AIR/WATER/SUCTION AND BIOPSY VALVE

## (undated) DEVICE — CUFF,BP,DISP,1 TUBE,ADULT,HP: Brand: MEDLINE

## (undated) DEVICE — THE CHANNEL CLEANING BRUSH IS A NYLON FLEXI BRUSH ATTACHED TO A FLEXIBLE PLASTIC SHEATH DESIGNED TO SAFELY REMOVE DEBRIS FROM FLEXIBLE ENDOSCOPES.

## (undated) DEVICE — SUTURE PERMAHAND SZ 3-0 L30IN NONABSORBABLE BLK SH L26MM K832H

## (undated) DEVICE — PATIENT RETURN ELECTRODE, SINGLE-USE, CONTACT QUALITY MONITORING, ADULT, WITH 9FT CORD, FOR PATIENTS WEIGING OVER 33LBS. (15KG): Brand: MEGADYNE

## (undated) DEVICE — ADHS SKIN PREMIERPRO EXOFIN TOPICAL HI/VISC .5ML

## (undated) DEVICE — SEAL

## (undated) DEVICE — GOWN,PREVENTION PLUS,XLN/XL,ST,24/CS: Brand: MEDLINE

## (undated) DEVICE — TISSUE RETRIEVAL SYSTEM: Brand: INZII RETRIEVAL SYSTEM

## (undated) DEVICE — FLUID MGMT SYS FLUENT KIT 6/PK

## (undated) DEVICE — ELECTRODE SURG THERMOABL RF DISPER NS DISP SONATA

## (undated) DEVICE — ARM DRAPE

## (undated) DEVICE — GOWN, ORBIS, XLNG/XXLARGE, STRL: Brand: MEDLINE

## (undated) DEVICE — DEVICE ABLATN ENDOMET US NOVASURE V5

## (undated) DEVICE — CONMED SCOPE SAVER BITE BLOCK, 20X27 MM: Brand: SCOPE SAVER

## (undated) DEVICE — STOMACH TUBE LEVIN TYPE: Brand: ARGYLE

## (undated) DEVICE — Device

## (undated) DEVICE — ELECTRD BLD EZ CLN MOD XLNG 2.75IN

## (undated) DEVICE — 4-PORT MANIFOLD: Brand: NEPTUNE 2

## (undated) DEVICE — SURGICAL PROCEDURE PACK GYNECOLOGIC MIN LOURDES HOSP

## (undated) DEVICE — GLOVE SURG SZ 75 CRM LTX FREE POLYISOPRENE POLYMER BEAD ANTI

## (undated) DEVICE — GLOVE,SURG,SENSICARE,ALOE,LF,PF,6: Brand: MEDLINE

## (undated) DEVICE — ESOPHAGEAL BALLOON DILATATION CATHETER: Brand: CRE FIXED WIRE

## (undated) DEVICE — SYR LL TP 10ML STRL

## (undated) DEVICE — TOWEL,OR,DSP,ST,BLUE,DLX,4/PK,20PK/CS: Brand: MEDLINE

## (undated) DEVICE — Device: Brand: SINGLE USE ELECTROSURGICAL SNARE SD-400

## (undated) DEVICE — STRAP SFTY OR/TABL STRTCHR 60X3IN WHT LF

## (undated) DEVICE — KT CLN CLEANOR SCPE

## (undated) DEVICE — SUT MNCRYL 4/0 PS2 27IN UD MCP426H

## (undated) DEVICE — SENSR O2 OXIMAX FNGR A/ 18IN NONSTR

## (undated) DEVICE — YANKAUER,BULB TIP WITH VENT: Brand: ARGYLE

## (undated) DEVICE — HANDPIECE THERMOABLATION RF STRL DISP SONATA

## (undated) DEVICE — NDL HYPO PRECISIONGLIDE REG 21G 1 1/2

## (undated) DEVICE — LEGGINGS, PAIR, CLEAR, STERILE: Brand: MEDLINE

## (undated) DEVICE — MASK,OXYGEN,MED CONC,ADLT,7' TUB, UC: Brand: PENDING

## (undated) DEVICE — ELECTRODE ES L11CM DIA5MM BALL SHFT RED DISP UTAHLOOP

## (undated) DEVICE — SEAL ENDO INSTR SELF SEAL UROLOGY

## (undated) DEVICE — DEV INFL ALLIANCE2 SYS

## (undated) DEVICE — TBG INSUFFLATION LUER LOCK: Brand: MEDLINE INDUSTRIES, INC.

## (undated) DEVICE — SOLUTION IRRIG 3000ML 0.9% SOD CHL USP UROMATIC PLAS CONT

## (undated) DEVICE — GLOVE SURG SZ 65 CRM LTX FREE POLYISOPRENE POLYMER BEAD ANTI

## (undated) DEVICE — SYRINGE MED 10ML LUERLOCK TIP W/O SFTY DISP

## (undated) DEVICE — GLV SURG SENSICARE W/ALOE PF LF 6.5 STRL

## (undated) DEVICE — APPL CHLORAPREP HI/LITE 26ML ORNG

## (undated) DEVICE — SOLUTION IV IRRIG POUR BRL 0.9% SODIUM CHL 2F7124

## (undated) DEVICE — THE SINGLE USE ETRAP – POLYP TRAP IS USED FOR SUCTION RETRIEVAL OF ENDOSCOPICALLY REMOVED POLYPS.: Brand: ETRAP

## (undated) DEVICE — COVER LT HNDL BLU PLAS

## (undated) DEVICE — DEVICE TISS REM FOR HYSTEROSCOPIC IU PROC MYOSURE XL